# Patient Record
Sex: FEMALE | Race: WHITE | Employment: FULL TIME | URBAN - METROPOLITAN AREA
[De-identification: names, ages, dates, MRNs, and addresses within clinical notes are randomized per-mention and may not be internally consistent; named-entity substitution may affect disease eponyms.]

---

## 2017-09-29 DIAGNOSIS — I10 ESSENTIAL HYPERTENSION WITH GOAL BLOOD PRESSURE LESS THAN 140/90: ICD-10-CM

## 2017-10-01 RX ORDER — LOSARTAN POTASSIUM 50 MG/1
50 TABLET ORAL DAILY
Qty: 15 TAB | Refills: 0 | Status: SHIPPED | OUTPATIENT
Start: 2017-10-01 | End: 2017-12-29 | Stop reason: SDUPTHER

## 2017-10-04 NOTE — TELEPHONE ENCOUNTER
[facility] requesting [facility] days    Faxed form to [facility] patient needs office visit for 90 days medication refill [phone]

## 2017-12-06 ENCOUNTER — OFFICE VISIT (OUTPATIENT)
Dept: FAMILY MEDICINE CLINIC | Age: 57
End: 2017-12-06

## 2017-12-06 VITALS
WEIGHT: 123.2 LBS | DIASTOLIC BLOOD PRESSURE: 87 MMHG | TEMPERATURE: 97.1 F | BODY MASS INDEX: 19.8 KG/M2 | RESPIRATION RATE: 18 BRPM | SYSTOLIC BLOOD PRESSURE: 132 MMHG | OXYGEN SATURATION: 97 % | HEART RATE: 79 BPM | HEIGHT: 66 IN

## 2017-12-06 DIAGNOSIS — Z00.00 PHYSICAL EXAM: Primary | ICD-10-CM

## 2017-12-06 DIAGNOSIS — Z11.59 SCREENING FOR VIRAL DISEASE: ICD-10-CM

## 2017-12-06 DIAGNOSIS — Z12.11 COLON CANCER SCREENING: ICD-10-CM

## 2017-12-06 DIAGNOSIS — L98.9 SKIN LESION OF FOOT: ICD-10-CM

## 2017-12-06 DIAGNOSIS — Z12.31 SCREENING MAMMOGRAM, ENCOUNTER FOR: ICD-10-CM

## 2017-12-06 NOTE — PROGRESS NOTES
HISTORY OF PRESENT ILLNESS  Didier Bryan is a 60 y.o. female. Blood pressure 132/87, pulse 79, temperature 97.1 °F (36.2 °C), temperature source Oral, resp. rate 18, height 5' 6\" (1.676 m), weight 123 lb 3.2 oz (55.9 kg), SpO2 97 %. Body mass index is 19.89 kg/(m^2). Chief Complaint   Patient presents with    Complete Physical     GYN and breast care done by Dr. Efren Llanes        HPI  Didier Bryan 60 y.o. female  presents to the office today for a complete physical.     Hypertension: Bp at office today 132/87. Pt continues with Losartan 50mg daily. Bp control stable, continue current regimen. Health maintenance: Advised pt that she needs to come in for blood work. Also advised pt that she is due for a mammogram, will order one for her to schedule today. Discussed with pt that we need to run a Hep C screening. Also discussed with pt that she is due for a colonoscopy, but due to her time restrictions I will give her a FIT test to take at home. Pt states that she has vericose veins that develop red spots, so she saw a dermatologist who told her it was due to sun exposure. Pt also notes she has a spot on the outside of her right ankle that itches occasionally. Advised pt to have her dermatologist look at it. Current Outpatient Prescriptions   Medication Sig Dispense Refill    losartan (COZAAR) 50 mg tablet Take 1 Tab by mouth daily. 15 Tab 0    cholecalciferol, vitamin D3, 2,000 unit tab Take  by mouth.  B.infantis-B.ani-B.long-B.bifi (PROBIOTIC 4X) 10-15 mg TbEC Take  by mouth.  diphenhydrAMINE (BENADRYL) 25 mg capsule Take 25 mg by mouth every six (6) hours as needed.  CALCIUM CARBONATE/VITAMIN D3 (CALCIUM + D PO) Take  by mouth. Total 770 mg daily calcium and 1000 units vitamin d      MULTIVITAMIN PO Take 1 Tab by mouth daily.       predniSONE (DELTASONE) 20 mg tablet       triamcinolone acetonide (KENALOG) 0.1 % topical cream       lidocaine (LIDODERM) 5 % APPLY PATCH TO THE AFFECTED AREA FOR 12 HOURS A DAY AND REMOVE FOR 12 HOURS A DAY. 30 Each 3    aspirin 81 mg chewable tablet Take 1 Tab by mouth daily.  JASMYNE BERRY PO Take  by mouth. No Known Allergies  Past Medical History:   Diagnosis Date    Abnormal Pap smear     h/o    Allergic rhinitis due to other allergen     Esophageal reflux     Herpes zoster     Mixed hyperlipidemia     Osteopenia     Other atopic dermatitis and related conditions     Shingles rash 10/2015    small spot on left shoulder    Unspecified spontaneous  without mention of complication     X2     Past Surgical History:   Procedure Laterality Date    COLPOSCOPY  29 y/o    with cryo    DILATION AND CURETTAGE      HX COLONOSCOPY      HX WRIST FRACTURE TX      with repair     Family History   Problem Relation Age of Onset    Hypertension Mother     Heart Disease Mother      CHF    Cancer Mother      kidney    Heart Attack Father     Coronary Artery Disease Father      with pacemaker    Asthma Father     Heart Disease Father      pacemaker    Stroke Maternal Grandmother     Heart Attack Paternal Grandfather      Social History   Substance Use Topics    Smoking status: Never Smoker    Smokeless tobacco: Never Used    Alcohol use No        Review of Systems   Constitutional: Negative for chills, diaphoresis, fever, malaise/fatigue and weight loss. HENT: Negative for congestion, ear discharge, ear pain, hearing loss, nosebleeds, sore throat and tinnitus. Eyes: Negative for blurred vision, double vision, photophobia, pain, discharge and redness. Respiratory: Negative for cough, hemoptysis, sputum production, shortness of breath, wheezing and stridor. Cardiovascular: Negative for chest pain, palpitations, orthopnea, claudication, leg swelling and PND. Gastrointestinal: Negative for abdominal pain, blood in stool, constipation, diarrhea, heartburn, melena, nausea and vomiting.    Genitourinary: Negative for dysuria, flank pain, frequency, hematuria and urgency. Musculoskeletal: Negative for back pain, falls, joint pain, myalgias and neck pain. Skin: Negative for itching and rash. Neurological: Negative for dizziness, tingling, tremors, sensory change, speech change, focal weakness, seizures, loss of consciousness, weakness and headaches. Endo/Heme/Allergies: Negative for environmental allergies and polydipsia. Does not bruise/bleed easily. Psychiatric/Behavioral: Negative for depression, hallucinations, memory loss, substance abuse and suicidal ideas. The patient is not nervous/anxious and does not have insomnia. All other systems reviewed and are negative. Physical Exam   Constitutional: She is oriented to person, place, and time. She appears well-developed and well-nourished. No distress. HENT:   Head: Normocephalic and atraumatic. Right Ear: External ear normal.   Left Ear: External ear normal.   Nose: Nose normal.   Mouth/Throat: Oropharynx is clear and moist. No oropharyngeal exudate. Eyes: Conjunctivae and EOM are normal. Pupils are equal, round, and reactive to light. Right eye exhibits no discharge. Left eye exhibits no discharge. No scleral icterus. Neck: Normal range of motion. Neck supple. No JVD present. No tracheal deviation present. No thyromegaly present. Cardiovascular: Normal rate, regular rhythm, normal heart sounds and intact distal pulses. Exam reveals no gallop and no friction rub. No murmur heard. Pulmonary/Chest: Effort normal and breath sounds normal. No stridor. No respiratory distress. She has no wheezes. She has no rales. She exhibits no tenderness. Abdominal: Soft. Bowel sounds are normal. She exhibits no distension and no mass. There is no tenderness. There is no rebound and no guarding. Musculoskeletal: Normal range of motion. She exhibits no edema or tenderness. Lymphadenopathy:     She has no cervical adenopathy.    Neurological: She is alert and oriented to person, place, and time. She has normal reflexes. No cranial nerve deficit. She exhibits normal muscle tone. Coordination normal.   Skin: Skin is warm and dry. No rash noted. She is not diaphoretic. No erythema. No pallor. Psychiatric: She has a normal mood and affect. Her behavior is normal. Judgment and thought content normal.   Nursing note and vitals reviewed. ASSESSMENT and PLAN  Diagnoses and all orders for this visit:    1. Physical exam  -     METABOLIC PANEL, COMPREHENSIVE  -     CBC WITH AUTOMATED DIFF  -     LIPID PANEL  -     TSH 3RD GENERATION  -     T4, FREE  -     URINALYSIS W/MICROSCOPIC  -     VITAMIN D, 25 HYROXY PANEL  -     OCCULT BLOOD, IMMUNOASSAY (FIT); Future    2. Screening for viral disease  -     HCV AB W/RFLX TO NAKUL  - Advised pt that we need to get a Hep C screening on her due    3. Screening mammogram, encounter for  -     EVA MAMMO BI SCREENING INCL CAD; Future  - Advised pt that she is due for a mammogram and will order one for her to schedule. 4. Colon cancer screening  -     OCCULT BLOOD, IMMUNOASSAY (FIT); Future  - Discussed with pt that she is due for a colonoscopy, but due to her time restrictions I will give her a FIT test to take at home. 5. Skin lesion of foot        - Advised pt to follow up with her dermatologist for the lesion on her foot. Comments:  right foot      Follow-up Disposition:  Return in about 1 year (around 12/6/2018) for physical exam.   Medication risks/benefits/costs/interactions/alternatives discussed with patient. Advised patient to call back or return to office if symptoms worsen/change/persist.  If patient cannot reach us or should anything more severe/urgent arise he/she should proceed directly to the nearest emergency department. Discussed expected course/resolution/complications of diagnosis in detail with patient.   Patient given a written after visit summary which includes her diagnoses, current medications and vitals. Patient expressed understanding with the diagnosis and plan. Written by chasity Peña, as dictated by Vicki Jaquez M.D.   I have reviewed and agree with the above note and have made corrections where appropriate, Dr. Frances Hall MD

## 2017-12-06 NOTE — PROGRESS NOTES
Chief Complaint   Patient presents with    Complete Physical     GYN and breast care done by Dr. Dav Guerra       Reviewed Record in preparation for visit and have obtained necessary documentation. Identified pt with two pt identifiers (Name @ )    Health Maintenance Due   Topic    Hepatitis C Screening     DTaP/Tdap/Td series (1 - Tdap)    COLONOSCOPY     BREAST CANCER SCRN MAMMOGRAM     PAP AKA CERVICAL CYTOLOGY     Influenza Age 5 to Adult          1. Have you been to the ER, urgent care clinic since your last visit? Hospitalized since your last visit? No    2. Have you seen or consulted any other health care providers outside of the [address] [place] [address] since your last visit? Include any pap smears or colon screening.  No

## 2017-12-06 NOTE — LETTER
1/9/2018 8:39 AM 
 
Ms. Khari Kramer [address] [place] [address] Drive [id number] Dear Khari Kramer: 
 
Please find your most recent results below. Resulted Orders METABOLIC PANEL, COMPREHENSIVE Result Value Ref Range Glucose 86 65 - 99 mg/dL BUN 13 6 - 24 mg/dL Creatinine 0.85 0.57 - 1.00 mg/dL GFR est non-AA 75 >59 mL/min/1.73 GFR est AA 87 >59 mL/min/1.73  
 BUN/Creatinine ratio 15 9 - 23 Sodium 139 134 - 144 mmol/L Potassium 4.1 3.5 - 5.2 mmol/L Chloride 99 96 - 106 mmol/L  
 CO2 29 18 - 29 mmol/L Calcium 9.4 8.7 - 10.2 mg/dL Protein, total 6.9 6.0 - 8.5 g/dL Albumin 4.5 3.5 - 5.5 g/dL GLOBULIN, TOTAL 2.4 1.5 - 4.5 g/dL A-G Ratio 1.9 1.2 - 2.2 Bilirubin, total 0.5 0.0 - 1.2 mg/dL Alk. phosphatase 46 39 - 117 IU/L  
 AST (SGOT) 19 0 - 40 IU/L  
 ALT (SGPT) 13 0 - 32 IU/L Narrative Performed at:  [address]  [id number] : Matt Luu MD, Phone:  [phone] CBC WITH AUTOMATED DIFF Result Value Ref Range WBC 4.7 3.4 - 10.8 x10E3/uL  
 RBC 4.47 3.77 - 5.28 x10E6/uL HGB 13.2 11.1 - 15.9 g/dL HCT 40.3 34.0 - 46.6 % MCV 90 79 - 97 fL  
 MCH 29.5 26.6 - 33.0 pg  
 MCHC 32.8 31.5 - 35.7 g/dL  
 RDW 13.5 12.3 - 15.4 % PLATELET 485 861 - 189 x10E3/uL NEUTROPHILS 60 Not Estab. % Lymphocytes 32 Not Estab. % MONOCYTES 6 Not Estab. % EOSINOPHILS 1 Not Estab. % BASOPHILS 1 Not Estab. %  
 ABS. NEUTROPHILS 2.8 1.4 - 7.0 x10E3/uL Abs Lymphocytes 1.5 0.7 - 3.1 x10E3/uL  
 ABS. MONOCYTES 0.3 0.1 - 0.9 x10E3/uL  
 ABS. EOSINOPHILS 0.1 0.0 - 0.4 x10E3/uL  
 ABS. BASOPHILS 0.0 0.0 - 0.2 x10E3/uL IMMATURE GRANULOCYTES 0 Not Estab. %  
 ABS. IMM. GRANS. 0.0 0.0 - 0.1 x10E3/uL Narrative Performed at:  [address]  [id number] : Matt Luu MD, Phone:  [phone] LIPID PANEL Result Value Ref Range Cholesterol, total 235 (H) 100 - 199 mg/dL Triglyceride 81 0 - 149 mg/dL HDL Cholesterol 73 >39 mg/dL VLDL, calculated 16 5 - 40 mg/dL LDL, calculated 146 (H) 0 - 99 mg/dL Narrative Performed at:  [address]  [id number] : Franco Tomlinson MD, Phone:  [phone] TSH 3RD GENERATION Result Value Ref Range TSH 1.720 0.450 - 4.500 uIU/mL Narrative Performed at:  [address]  [id number] : Franco Tomlinson MD, Phone:  [phone] T4, FREE Result Value Ref Range T4, Free 1.14 0.82 - 1.77 ng/dL Narrative Performed at:  [address]  [id number] : Franco Tomlinson MD, Phone:  [phone] URINALYSIS W/MICROSCOPIC Result Value Ref Range Specific Gravity 1.020 1.005 - 1.030  
 pH (UA) 7.5 5.0 - 7.5 Color Yellow Yellow Appearance Clear Clear Leukocyte Esterase Negative Negative Protein Negative Negative/Trace Glucose Negative Negative Ketone Negative Negative Blood Negative Negative Bilirubin Negative Negative Urobilinogen 0.2 0.2 - 1.0 mg/dL Nitrites Negative Negative Microscopic Examination Comment Comment:  
   Microscopic follows if indicated. Microscopic exam See additional order Comment:  
   Microscopic was indicated and was performed. Narrative Performed at:  [address]  [id number] : Franco Tomlinson MD, Phone:  [phone] VITAMIN D, 25 HYROXY PANEL Result Value Ref Range 25-HYDROXY, VITAMIN D 45 ng/mL Comment:  
   Reference Range: All Ages: Target levels 30 - 100 
  
 25-HYDROXY, VITAMIN D-2 <1.0 ng/mL 25-HYDROXY, VITAMIN D-3 44 ng/mL Narrative Performed at:  [address] [place] [address] Endocrinology [address] [place] [address]  [de-identified] : Brant Rebollar MD, Phone:  [phone] HCV AB W/RFLX TO NAKUL Result Value Ref Range HCV Ab <0.1 0.0 - 0.9 s/co ratio Narrative Performed at:  [address]  [id number] : Lisa West MD, Phone:  [phone] MICROSCOPIC EXAMINATION Result Value Ref Range WBC 0-5 0 - 5 /hpf  
 RBC 0-2 0 - 2 /hpf Epithelial cells None seen 0 - 10 /hpf Casts None seen None seen /lpf Mucus Present Not Estab. Bacteria Few None seen/Few Narrative Performed at:  [address]  [id number] : Lisa West MD, Phone:  [phone] INTERPRETATION Result Value Ref Range HCV Interpretation Comment Comment:  
   Negative Not infected with HCV, unless recent infection is suspected or other 
evidence exists to indicate HCV infection. Narrative Performed at:  [address]  [id number] : Lisa West MD, Phone:  [phone] CVD REPORT Result Value Ref Range INTERPRETATION Note Comment:  
   Supplemental report is available. Narrative Performed at:  [address] [place] [address]  [id number] : Krzysztof Carcamo PhD, Phone:  [phone] Please call me if you have any questions: [phone] Sincerely, Patrick Noble MD

## 2017-12-06 NOTE — MR AVS SNAPSHOT
Visit Information Date & Time Provider Department Dept. Phone Encounter #  
 12/6/2017  3:15 PM Sam Thakkar MD [address] [place] [address] Avenue [phone] [id number] Follow-up Instructions Return in about 1 year (around 12/6/2018) for physical exam. Upcoming Health Maintenance Date Due Hepatitis C Screening 1960 DTaP/Tdap/Td series (1 - Tdap) 12/26/1979 COLONOSCOPY 1/15/2015 BREAST CANCER SCRN MAMMOGRAM 11/1/2015 PAP AKA CERVICAL CYTOLOGY 1/1/2016 Allergies as of 12/6/2017  Review Complete On: 12/6/2017 By: Javy Rueda LPN No Known Allergies Current Immunizations  Never Reviewed Name Date Influenza Vaccine 10/4/2015 Not reviewed this visit You Were Diagnosed With   
  
 Codes Comments Physical exam    -  Primary ICD-10-CM: Z00.00 ICD-9-CM: V70.9 Screening for viral disease     ICD-10-CM: Z11.59 
ICD-9-CM: V73.99 Screening mammogram, encounter for     ICD-10-CM: Z12.31 
ICD-9-CM: V76.12 Colon cancer screening     ICD-10-CM: Z12.11 ICD-9-CM: V76.51 Skin lesion of foot     ICD-10-CM: L98.9 ICD-9-CM: 709.9 right foot Vitals BP Pulse Temp Resp Height(growth percentile) Weight(growth percentile) 132/87 (BP 1 Location: Left arm, BP Patient Position: Sitting) 79 97.1 °F (36.2 °C) (Oral) 18 5' 6\" (1.676 m) 123 lb 3.2 oz (55.9 kg) SpO2 BMI OB Status Smoking Status 97% 19.89 kg/m2 Postmenopausal Never Smoker Vitals History BMI and BSA Data Body Mass Index Body Surface Area  
 19.89 kg/m 2 1.61 m 2 Preferred Pharmacy Pharmacy Name Phone [id number], [address] [place] [address] [phone] Your Updated Medication List  
  
   
This list is accurate as of: 12/6/17  4:26 PM.  Always use your most recent med list.  
  
  
  
  
 aspirin 81 mg chewable tablet Take 1 Tab by mouth daily. BENADRYL 25 mg capsule Generic drug:  diphenhydrAMINE Take 25 mg by mouth every six (6) hours as needed. CALCIUM + D PO Take  by mouth. Total 770 mg daily calcium and 1000 units vitamin d  
  
 cholecalciferol (vitamin D3) 2,000 unit Tab Take  by mouth. JASMYNE ARVIZU PO Take  by mouth.  
  
 lidocaine 5 % Commonly known as:  LIDODERM  
APPLY PATCH TO THE AFFECTED AREA FOR 12 HOURS A DAY AND REMOVE FOR 12 HOURS A DAY. losartan 50 mg tablet Commonly known as:  COZAAR Take 1 Tab by mouth daily. MULTIVITAMIN PO Take 1 Tab by mouth daily. predniSONE 20 mg tablet Commonly known as:  DELTASONE  
  
 PROBIOTIC 4X 10-15 mg Tbec Generic drug:  B.infantis-B.ani-B.long-B.bifi Take  by mouth.  
  
 triamcinolone acetonide 0.1 % topical cream  
Commonly known as:  KENALOG We Performed the Following CBC WITH AUTOMATED DIFF [81807 CPT(R)] HCV AB W/RFLX TO NAKUL [54563 CPT(R)] LIPID PANEL [03618 CPT(R)] METABOLIC PANEL, COMPREHENSIVE [61940 CPT(R)] T4, FREE [id number] CPT(R)] TSH 3RD GENERATION [56475 CPT(R)] URINALYSIS W/MICROSCOPIC [47883 CPT(R)] VITAMIN D, 25 HYROXY PANEL [[id number] Custom] Follow-up Instructions Return in about 1 year (around 12/6/2018) for physical exam. To-Do List   
 12/06/2017 Imaging:  EVA MAMMO BI SCREENING INCL CAD   
  
 01/05/2018 Lab:  OCCULT BLOOD, IMMUNOASSAY (FIT) Patient Instructions Well Visit, Ages 25 to 48: Care Instructions Your Care Instructions Physical exams can help you stay healthy. Your doctor has checked your overall health and may have suggested ways to take good care of yourself. He or she also may have recommended tests. At home, you can help prevent illness with healthy eating, regular exercise, and other steps. Follow-up care is a key part of your treatment and safety.  Be sure to make and go to all appointments, and call your doctor if you are having problems. It's also a good idea to know your test results and keep a list of the medicines you take. How can you care for yourself at home? · Reach and stay at a healthy weight. This will lower your risk for many problems, such as obesity, diabetes, heart disease, and high blood pressure. · Get at least 30 minutes of physical activity on most days of the week. Walking is a good choice. You also may want to do other activities, such as running, swimming, cycling, or playing tennis or team sports. Discuss any changes in your exercise program with your doctor. · Do not smoke or allow others to smoke around you. If you need help quitting, talk to your doctor about stop-smoking programs and medicines. These can increase your chances of quitting for good. · Talk to your doctor about whether you have any risk factors for sexually transmitted infections (STIs). Having one sex partner (who does not have STIs and does not have sex with anyone else) is a good way to avoid these infections. · Use birth control if you do not want to have children at this time. Talk with your doctor about the choices available and what might be best for you. · Protect your skin from too much sun. When you're outdoors from 10 a.m. to 4 p.m., stay in the shade or cover up with clothing and a hat with a wide brim. Wear sunglasses that block UV rays. Even when it's cloudy, put broad-spectrum sunscreen (SPF 30 or higher) on any exposed skin. · See a dentist one or two times a year for checkups and to have your teeth cleaned. · Wear a seat belt in the car. · Drink alcohol in moderation, if at all. That means no more than 2 drinks a day for men and 1 drink a day for women. Follow your doctor's advice about when to have certain tests. These tests can spot problems early. For everyone · Cholesterol. Have the fat (cholesterol) in your blood tested after age 21.  Your doctor will tell you how often to have this done based on your age, family history, or other things that can increase your risk for heart disease. · Blood pressure. Have your blood pressure checked during a routine doctor visit. Your doctor will tell you how often to check your blood pressure based on your age, your blood pressure results, and other factors. · Vision. Talk with your doctor about how often to have a glaucoma test. 
· Diabetes. Ask your doctor whether you should have tests for diabetes. · Colon cancer. Have a test for colon cancer at age 48. You may have one of several tests. If you are younger than 48, you may need a test earlier if you have any risk factors. Risk factors include whether you already had a precancerous polyp removed from your colon or whether your parent, brother, sister, or child has had colon cancer. For women · Breast exam and mammogram. Talk to your doctor about when you should have a clinical breast exam and a mammogram. Medical experts differ on whether and how often women under 50 should have these tests. Your doctor can help you decide what is right for you. · Pap test and pelvic exam. Begin Pap tests at age 24. A Pap test is the best way to find cervical cancer. The test often is part of a pelvic exam. Ask how often to have this test. 
· Tests for sexually transmitted infections (STIs). Ask whether you should have tests for STIs. You may be at risk if you have sex with more than one person, especially if your partners do not wear condoms. For men · Tests for sexually transmitted infections (STIs). Ask whether you should have tests for STIs. You may be at risk if you have sex with more than one person, especially if you do not wear a condom. · Testicular cancer exam. Ask your doctor whether you should check your testicles regularly. · Prostate exam. Talk to your doctor about whether you should have a blood test (called a PSA test) for prostate cancer.  Experts differ on whether and when men should have this test. Some experts suggest it if you are older than 39 and are -American or have a father or brother who got prostate cancer when he was younger than 72. When should you call for help? Watch closely for changes in your health, and be sure to contact your doctor if you have any problems or symptoms that concern you. Where can you learn more? Go to http://ilsa-michael.info/. Enter P072 in the search box to learn more about \"Well Visit, Ages 25 to 48: Care Instructions. \" Current as of: May 12, 2017 Content Version: 11.4 © 0116-3172 [employer]. Care instructions adapted under license by [employer] (which disclaims liability or warranty for this information). If you have questions about a medical condition or this instruction, always ask your healthcare professional. [address] any warranty or liability for your use of this information. Introducing [facility]! Dear Vania Belcher: Thank you for requesting a [employer] account. Our records indicate that you already have an active [employer] account. You can access your account anytime at [email or web address] Did you know that you can access your hospital and ER discharge instructions at any time in [employer]? You can also review all of your test results from your hospital stay or ER visit. Additional Information If you have questions, please visit the Frequently Asked Questions section of the [employer] website at [email or web address] Remember, [employer] is NOT to be used for urgent needs. For medical emergencies, dial 911. Now available from your iPhone and Android! Please provide this summary of care documentation to your next provider. Your primary care clinician is listed as Karen Carry. If you have any questions after today's visit, please call [phone].

## 2017-12-06 NOTE — PATIENT INSTRUCTIONS

## 2017-12-29 DIAGNOSIS — I10 ESSENTIAL HYPERTENSION WITH GOAL BLOOD PRESSURE LESS THAN 140/90: ICD-10-CM

## 2017-12-30 ENCOUNTER — APPOINTMENT (OUTPATIENT)
Dept: FAMILY MEDICINE CLINIC | Age: 57
End: 2017-12-30

## 2018-01-02 RX ORDER — LOSARTAN POTASSIUM 50 MG/1
50 TABLET ORAL DAILY
Qty: 30 TAB | Refills: 5 | Status: SHIPPED | OUTPATIENT
Start: 2018-01-02 | End: 2018-07-03 | Stop reason: SDUPTHER

## 2018-01-05 LAB
25(OH)D2 SERPL-MCNC: <1 NG/ML
25(OH)D3 SERPL-MCNC: 44 NG/ML
25(OH)D3+25(OH)D2 SERPL-MCNC: 45 NG/ML
ALBUMIN SERPL-MCNC: 4.5 G/DL (ref 3.5–5.5)
ALBUMIN/GLOB SERPL: 1.9 {RATIO} (ref 1.2–2.2)
ALP SERPL-CCNC: 46 IU/L (ref 39–117)
ALT SERPL-CCNC: 13 IU/L (ref 0–32)
APPEARANCE UR: CLEAR
AST SERPL-CCNC: 19 IU/L (ref 0–40)
BACTERIA #/AREA URNS HPF: NORMAL /[HPF]
BASOPHILS # BLD AUTO: 0 X10E3/UL (ref 0–0.2)
BASOPHILS NFR BLD AUTO: 1 %
BILIRUB SERPL-MCNC: 0.5 MG/DL (ref 0–1.2)
BILIRUB UR QL STRIP: NEGATIVE
BUN SERPL-MCNC: 13 MG/DL (ref 6–24)
BUN/CREAT SERPL: 15 (ref 9–23)
CALCIUM SERPL-MCNC: 9.4 MG/DL (ref 8.7–10.2)
CASTS URNS QL MICRO: NORMAL /LPF
CHLORIDE SERPL-SCNC: 99 MMOL/L (ref 96–106)
CHOLEST SERPL-MCNC: 235 MG/DL (ref 100–199)
CO2 SERPL-SCNC: 29 MMOL/L (ref 18–29)
COLOR UR: YELLOW
CREAT SERPL-MCNC: 0.85 MG/DL (ref 0.57–1)
EOSINOPHIL # BLD AUTO: 0.1 X10E3/UL (ref 0–0.4)
EOSINOPHIL NFR BLD AUTO: 1 %
EPI CELLS #/AREA URNS HPF: NORMAL /HPF
ERYTHROCYTE [DISTWIDTH] IN BLOOD BY AUTOMATED COUNT: 13.5 % (ref 12.3–15.4)
GLOBULIN SER CALC-MCNC: 2.4 G/DL (ref 1.5–4.5)
GLUCOSE SERPL-MCNC: 86 MG/DL (ref 65–99)
GLUCOSE UR QL: NEGATIVE
HCT VFR BLD AUTO: 40.3 % (ref 34–46.6)
HCV AB S/CO SERPL IA: <0.1 S/CO RATIO (ref 0–0.9)
HCV AB SERPL QL IA: NORMAL
HDLC SERPL-MCNC: 73 MG/DL
HGB BLD-MCNC: 13.2 G/DL (ref 11.1–15.9)
HGB UR QL STRIP: NEGATIVE
IMM GRANULOCYTES # BLD: 0 X10E3/UL (ref 0–0.1)
IMM GRANULOCYTES NFR BLD: 0 %
INTERPRETATION, 910389: NORMAL
KETONES UR QL STRIP: NEGATIVE
LDLC SERPL CALC-MCNC: 146 MG/DL (ref 0–99)
LEUKOCYTE ESTERASE UR QL STRIP: NEGATIVE
LYMPHOCYTES # BLD AUTO: 1.5 X10E3/UL (ref 0.7–3.1)
LYMPHOCYTES NFR BLD AUTO: 32 %
MCH RBC QN AUTO: 29.5 PG (ref 26.6–33)
MCHC RBC AUTO-ENTMCNC: 32.8 G/DL (ref 31.5–35.7)
MCV RBC AUTO: 90 FL (ref 79–97)
MICRO URNS: NORMAL
MICRO URNS: NORMAL
MONOCYTES # BLD AUTO: 0.3 X10E3/UL (ref 0.1–0.9)
MONOCYTES NFR BLD AUTO: 6 %
MUCOUS THREADS URNS QL MICRO: PRESENT
NEUTROPHILS # BLD AUTO: 2.8 X10E3/UL (ref 1.4–7)
NEUTROPHILS NFR BLD AUTO: 60 %
NITRITE UR QL STRIP: NEGATIVE
PH UR STRIP: 7.5 [PH] (ref 5–7.5)
PLATELET # BLD AUTO: 223 X10E3/UL (ref 150–379)
POTASSIUM SERPL-SCNC: 4.1 MMOL/L (ref 3.5–5.2)
PROT SERPL-MCNC: 6.9 G/DL (ref 6–8.5)
PROT UR QL STRIP: NEGATIVE
RBC # BLD AUTO: 4.47 X10E6/UL (ref 3.77–5.28)
RBC #/AREA URNS HPF: NORMAL /HPF
SODIUM SERPL-SCNC: 139 MMOL/L (ref 134–144)
SP GR UR: 1.02 (ref 1–1.03)
T4 FREE SERPL-MCNC: 1.14 NG/DL (ref 0.82–1.77)
TRIGL SERPL-MCNC: 81 MG/DL (ref 0–149)
TSH SERPL DL<=0.005 MIU/L-ACNC: 1.72 UIU/ML (ref 0.45–4.5)
UROBILINOGEN UR STRIP-MCNC: 0.2 MG/DL (ref 0.2–1)
VLDLC SERPL CALC-MCNC: 16 MG/DL (ref 5–40)
WBC # BLD AUTO: 4.7 X10E3/UL (ref 3.4–10.8)
WBC #/AREA URNS HPF: NORMAL /HPF

## 2018-01-06 NOTE — PROGRESS NOTES
Inform pt to go to my chart to see results and recommendations    Ms. Lio Plummer,  The labs in general look good, cholesterol is up a bit  I would like the LDL to be less than 130  Just work on diet and exercise, more fibers/veggies/fish  30 min of cardio daily. Rest of the labs look good.     If you have any questions let me know    Regards  H

## 2018-01-09 NOTE — PROGRESS NOTES
[phone] spoke to patient verified  Patient notified of above note and voiced understanding of what was read.   Mail results to patient per request

## 2018-03-02 ENCOUNTER — OFFICE VISIT (OUTPATIENT)
Dept: FAMILY MEDICINE CLINIC | Age: 58
End: 2018-03-02

## 2018-03-02 VITALS
HEART RATE: 63 BPM | HEIGHT: 66 IN | WEIGHT: 121.6 LBS | DIASTOLIC BLOOD PRESSURE: 88 MMHG | BODY MASS INDEX: 19.54 KG/M2 | OXYGEN SATURATION: 95 % | TEMPERATURE: 98 F | RESPIRATION RATE: 18 BRPM | SYSTOLIC BLOOD PRESSURE: 132 MMHG

## 2018-03-02 DIAGNOSIS — R39.15 URINARY URGENCY: Primary | ICD-10-CM

## 2018-03-02 LAB
BILIRUB UR QL STRIP: NEGATIVE
GLUCOSE UR-MCNC: NEGATIVE MG/DL
KETONES P FAST UR STRIP-MCNC: NEGATIVE MG/DL
PH UR STRIP: 7.5 [PH] (ref 4.6–8)
PROT UR QL STRIP: NEGATIVE
SP GR UR STRIP: 1.01 (ref 1–1.03)
UA UROBILINOGEN AMB POC: NORMAL (ref 0.2–1)
URINALYSIS CLARITY POC: CLEAR
URINALYSIS COLOR POC: YELLOW
URINE BLOOD POC: NORMAL
URINE LEUKOCYTES POC: NEGATIVE
URINE NITRITES POC: NEGATIVE

## 2018-03-02 NOTE — PATIENT INSTRUCTIONS
Kegel Exercises: Care Instructions  Your Care Instructions    Kegel exercises strengthen muscles around the bladder. These muscles control the flow of urine. Kegel exercises are sometime called \"pelvic floor\" exercises. They can help prevent urine leakage and keep the pelvic organs in place. A woman who just had a baby might want to try Kegel exercises. They can strengthen pelvic muscles that have been weakened by pregnancy and childbirth. A man or woman may use Kegel exercises to treat urine leakage. You do Kegel exercises by tightening the muscles you use when you urinate. You will likely need to do these exercises for several weeks to get better. Follow-up care is a key part of your treatment and safety. Be sure to make and go to all appointments, and call your doctor if you are having problems. It's also a good idea to know your test results and keep a list of the medicines you take. How can you care for yourself at home? · Do Kegel exercises. ¨ Find the muscles you need to strengthen. To do this, tighten the muscles that stop your urine while you are going to the bathroom. These are the same muscles you squeeze during Kegel exercises. ¨ Squeeze the muscles as hard as you can. Your belly and thighs should not move. ¨ Hold the squeeze for 3 seconds. Then relax for 3 seconds. ¨ Start with 3 seconds, and then add 1 second each week until you are able to squeeze for 10 seconds. ¨ Repeat the exercise 10 to 15 times for each session. Do three or more sessions each day. · You can check to see if you are using the right muscles. Place a finger in your vagina and squeeze around it. You are doing them right when you feel pressure around your finger. Your doctor may also suggest that you put special weights in your vagina while you do the exercises. · Do not smoke. It can irritate the bladder. If you need help quitting, talk to your doctor about stop-smoking programs and medicines.  These can increase your chances of quitting for good. Where can you learn more? Go to http://ilsa-michael.info/. Enter C419 in the search box to learn more about \"Kegel Exercises: Care Instructions. \"  Current as of: May 12, 2017  Content Version: 11.4  © 2006-9635 Healthwise, Incorporated. Care instructions adapted under license by [employer] (which disclaims liability or warranty for this information). If you have questions about a medical condition or this instruction, always ask your healthcare professional. [address] any warranty or liability for your use of this information.

## 2018-03-02 NOTE — PROGRESS NOTES
Patient Name: Lowell Koch   MRN: [id number]    Rosalina Duong is a 59 y.o. female who presents with the following:     Patient reports several year history of frequent urination, mostly at nighttime. States that it has gotten worse over the past few weeks. Symptoms include urinary urgency, frequency, and incomplete bladder emptying. Denies dysuria, history of kidney stones, vaginal symptoms, uterine prolapse, flank pain, abdominal pain. Does not have a history of smoking. 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 Usually does not have daytime symptoms. Does not have a history of recurrent UTIs. Tries to do Kegel exercises. History of 2 vaginal deliveries. No history of diabetes. Review of Systems   Constitutional: Negative for fever, malaise/fatigue and weight loss. Respiratory: Negative for cough, hemoptysis, shortness of breath and wheezing. Cardiovascular: Negative for chest pain, palpitations, leg swelling and PND. Gastrointestinal: Negative for abdominal pain, constipation, diarrhea, nausea and vomiting. Genitourinary: Positive for frequency and urgency. Negative for dysuria, flank pain and hematuria. The patient's medications, allergies, past medical history, surgical history, family history and social history were reviewed and updated where appropriate. Prior to Admission medications    Medication Sig Start Date End Date Taking? Authorizing Provider   losartan (COZAAR) 50 mg tablet Take 1 Tab by mouth daily. 1/2/18  Yes Vanessa Harvey MD   cholecalciferol, vitamin D3, 2,000 unit tab Take 2,000 Units by mouth daily. Yes Historical Provider   B.infantis-B.ani-B.long-B.bifi (PROBIOTIC 4X) 10-15 mg TbEC Take  by mouth. Yes Historical Provider   diphenhydrAMINE (BENADRYL) 25 mg capsule Take 25 mg by mouth every six (6) hours as needed.    Yes Historical Provider   CALCIUM CARBONATE/VITAMIN D3 (CALCIUM + D PO) Take  by mouth. Total 770 mg daily calcium and 1000 units vitamin d   Yes Historical Provider   MULTIVITAMIN PO Take 1 Tab by mouth daily. Yes Historical Provider       No Known Allergies        OBJECTIVE    Visit Vitals    /88 (BP 1 Location: Right arm, BP Patient Position: Sitting)    Pulse 63    Temp 98 °F (36.7 °C) (Oral)    Resp 18    Ht 5' 6\" (1.676 m)    Wt 121 lb 9.6 oz (55.2 kg)    SpO2 95%    BMI 19.63 kg/m2       Physical Exam   Constitutional: She is oriented to person, place, and time and well-developed, well-nourished, and in no distress. No distress. Eyes: Conjunctivae and EOM are normal. Pupils are equal, round, and reactive to light. Abdominal: Soft. Bowel sounds are normal. She exhibits no distension and no mass. There is no tenderness. There is no rebound and no guarding. Musculoskeletal: Normal range of motion. Neurological: She is alert and oriented to person, place, and time. Gait normal.   Skin: Skin is warm and dry. She is not diaphoretic. Psychiatric: Mood, memory, affect and judgment normal.   Nursing note and vitals reviewed. ASSESSMENT AND PLAN  Maulik Shepard is a 59 y.o. female who presents today for:    1. Urinary urgency  UA notable for trace blood. Will send for culture to rule out infection. Recommend urology referral for possible overactive bladder. Encourage timed voiding, withholding liquids before bedtime, and Kegel exercises. - CULTURE, URINE  - URINALYSIS W/ RFLX MICROSCOPIC  - REFERRAL TO UROLOGY  - AMB POC URINALYSIS DIP STICK AUTO W/O MICRO       Medications Discontinued During This Encounter   Medication Reason    losartan (COZAAR) 50 mg tablet Duplicate Order       Follow-up Disposition:  Return if symptoms worsen or fail to improve. Medication risks/benefits/costs/interactions/alternatives discussed with patient.   Advised patient to call back or return to office if symptoms worsen/change/persist. If patient cannot reach us or should anything more severe/urgent arise he/she should proceed directly to the nearest emergency department. Discussed expected course/resolution/complications of diagnosis in detail with patient. Patient given a written after visit summary which includes his/her diagnoses, current medications and vitals. Patient expressed understanding with the diagnosis and plan.      Adrián Desai M.D.

## 2018-03-02 NOTE — MR AVS SNAPSHOT
[address] [place] [address] 
[phone] Patient: Erlinda Blake MRN: [id number] :1960 Visit Information Date & Time Provider Department Dept. Phone Encounter #  
 3/2/2018  9:15 AM Min Richter MD [facility] [phone] [id number] Upcoming Health Maintenance Date Due DTaP/Tdap/Td series (1 - Tdap) 1979 COLONOSCOPY 1/15/2015 BREAST CANCER SCRN MAMMOGRAM 2015 PAP AKA CERVICAL CYTOLOGY 2016 Allergies as of 3/2/2018  Review Complete On: 3/2/2018 By: Anthony Shows No Known Allergies Current Immunizations  Never Reviewed Name Date Influenza Vaccine 10/4/2015 Not reviewed this visit You Were Diagnosed With   
  
 Codes Comments Urination frequency    -  Primary ICD-10-CM: R35.0 ICD-9-CM: 788.41 Vitals BP Pulse Temp Resp Height(growth percentile) Weight(growth percentile) 132/88 (BP 1 Location: Right arm, BP Patient Position: Sitting) 63 98 °F (36.7 °C) (Oral) 18 5' 6\" (1.676 m) 121 lb 9.6 oz (55.2 kg) SpO2 BMI OB Status Smoking Status 95% 19.63 kg/m2 Postmenopausal Never Smoker Vitals History BMI and BSA Data Body Mass Index Body Surface Area  
 19.63 kg/m 2 1.6 m 2 Preferred Pharmacy Pharmacy Name Phone [id number] [phone] Your Updated Medication List  
  
   
This list is accurate as of 3/2/18 10:00 AM.  Always use your most recent med list.  
  
  
  
  
 BENADRYL 25 mg capsule Generic drug:  diphenhydrAMINE Take 25 mg by mouth every six (6) hours as needed. CALCIUM + D PO Take  by mouth. Total 770 mg daily calcium and 1000 units vitamin d  
  
 cholecalciferol (vitamin D3) 2,000 unit Tab Take 2,000 Units by mouth daily. losartan 50 mg tablet Commonly known as:  COZAAR  
 Take 1 Tab by mouth daily. MULTIVITAMIN PO Take 1 Tab by mouth daily. PROBIOTIC 4X 10-15 mg Tbec Generic drug:  B.infantis-B.ani-B.long-B.bifi Take  by mouth. We Performed the Following CULTURE, URINE [id number] CPT(R)] REFERRAL TO UROLOGY [RTK152 Custom] Comments:  
 Patient will schedule referral himself/herself URINALYSIS W/ RFLX MICROSCOPIC [51786 CPT(R)] Referral Information Referral ID Referred By Referred To  
  
 [id number] Rylie Pritchard MD   
   [facility] [address] [facility], [address] Phone: [phone] Fax: [phone] Visits Status Start Date End Date 1 New Request 3/2/18 3/2/19 If your referral has a status of pending review or denied, additional information will be sent to support the outcome of this decision. Patient Instructions Kegel Exercises: Care Instructions Your Care Instructions Kegel exercises strengthen muscles around the bladder. These muscles control the flow of urine. Kegel exercises are sometime called \"pelvic floor\" exercises. They can help prevent urine leakage and keep the pelvic organs in place. A woman who just had a baby might want to try Kegel exercises. They can strengthen pelvic muscles that have been weakened by pregnancy and childbirth. A man or woman may use Kegel exercises to treat urine leakage. You do Kegel exercises by tightening the muscles you use when you urinate. You will likely need to do these exercises for several weeks to get better. Follow-up care is a key part of your treatment and safety. Be sure to make and go to all appointments, and call your doctor if you are having problems. It's also a good idea to know your test results and keep a list of the medicines you take. How can you care for yourself at home? · Do Kegel exercises. ¨ Find the muscles you need to strengthen.  To do this, tighten the muscles that stop your urine while you are going to the bathroom. These are the same muscles you squeeze during Kegel exercises. ¨ Squeeze the muscles as hard as you can. Your belly and thighs should not move. ¨ Hold the squeeze for 3 seconds. Then relax for 3 seconds. ¨ Start with 3 seconds, and then add 1 second each week until you are able to squeeze for 10 seconds. ¨ Repeat the exercise 10 to 15 times for each session. Do three or more sessions each day. · You can check to see if you are using the right muscles. Place a finger in your vagina and squeeze around it. You are doing them right when you feel pressure around your finger. Your doctor may also suggest that you put special weights in your vagina while you do the exercises. · Do not smoke. It can irritate the bladder. If you need help quitting, talk to your doctor about stop-smoking programs and medicines. These can increase your chances of quitting for good. Where can you learn more? Go to http://ilsa-michael.info/. Enter U268 in the search box to learn more about \"Kegel Exercises: Care Instructions. \" Current as of: May 12, 2017 Content Version: 11.4 © 9282-0785 [employer]. Care instructions adapted under license by [employer] (which disclaims liability or warranty for this information). If you have questions about a medical condition or this instruction, always ask your healthcare professional. [place] [address] any warranty or liability for your use of this information. Introducing [facility]! Dear Otilio Cope: Thank you for requesting a Strap account. Our records indicate that you already have an active Strap account. You can access your account anytime at [email or web address]. [email or web address] Did you know that you can access your hospital and ER discharge instructions at any time in Strap?   You can also review all of your test results from your hospital stay or ER visit. Additional Information If you have questions, please visit the Frequently Asked Questions section of the VeliQ website at [email or web address] Remember, VeliQ is NOT to be used for urgent needs. For medical emergencies, dial 911. Now available from your iPhone and Android! Please provide this summary of care documentation to your next provider. Your primary care clinician is listed as Matthew Rodriges. If you have any questions after today's visit, please call [phone].

## 2018-03-02 NOTE — PROGRESS NOTES
Chief Complaint   Patient presents with    Urinary Frequency     during the night - denies any pain or disconfort x 3 days     1. Have you been to the ER, urgent care clinic since your last visit? Hospitalized since your last visit? No    2. Have you seen or consulted any other health care providers outside of the [address] [place] [address] since your last visit? Include any pap smears or colon screening.  No

## 2018-03-03 LAB — BACTERIA UR CULT: NO GROWTH

## 2018-03-04 LAB
APPEARANCE UR: CLEAR
BILIRUB UR QL STRIP: NEGATIVE
COLOR UR: YELLOW
GLUCOSE UR QL: NEGATIVE
HGB UR QL STRIP: NEGATIVE
KETONES UR QL STRIP: NEGATIVE
LEUKOCYTE ESTERASE UR QL STRIP: NEGATIVE
MICRO URNS: ABNORMAL
NITRITE UR QL STRIP: NEGATIVE
PH UR STRIP: 8 [PH] (ref 5–7.5)
PROT UR QL STRIP: NEGATIVE
SP GR UR: 1.01 (ref 1–1.03)
UROBILINOGEN UR STRIP-MCNC: 0.2 MG/DL (ref 0.2–1)

## 2018-03-05 NOTE — PROGRESS NOTES
Please notify patient regarding their test results:    No UTI or blood on confirmatory urine tests. Pt to f/u with urology as discussed.

## 2018-03-07 NOTE — PROGRESS NOTES
Call to patient.  verified. Informed patient of results and recommendations.  Patient understands to follow up with urologist

## 2018-07-11 ENCOUNTER — OFFICE VISIT (OUTPATIENT)
Dept: FAMILY MEDICINE CLINIC | Age: 58
End: 2018-07-11

## 2018-07-11 VITALS
BODY MASS INDEX: 19.73 KG/M2 | RESPIRATION RATE: 16 BRPM | HEART RATE: 70 BPM | HEIGHT: 66 IN | TEMPERATURE: 97.6 F | WEIGHT: 122.8 LBS | DIASTOLIC BLOOD PRESSURE: 82 MMHG | SYSTOLIC BLOOD PRESSURE: 118 MMHG | OXYGEN SATURATION: 95 %

## 2018-07-11 DIAGNOSIS — R22.2 ABDOMINAL WALL LUMP: Primary | ICD-10-CM

## 2018-07-11 NOTE — PROGRESS NOTES
Chief Complaint   Patient presents with    Other     swelling in lower abdomen, pt thinks this may be due to loose abdominal muscles for about 1 year. 1. Have you been to the ER, urgent care clinic since your last visit? Hospitalized since your last visit? No    2. Have you seen or consulted any other health care providers outside of the [address] [place] [address] since your last visit? Include any pap smears or colon screening.  No

## 2018-07-11 NOTE — PROGRESS NOTES
HISTORY OF PRESENT ILLNESS  Harrison Stephens is a 59 y.o. female. HPI: Patient reports she noticed a large lump on her abdomen several months ago. It started after exercising and it is progressively getting worse. It is more noticeable with sitting, better with lying down. It is associated with mild discomfort. Past Medical History:   Diagnosis Date    Abnormal Pap smear     h/o    Allergic rhinitis due to other allergen     Esophageal reflux     Herpes zoster     Mixed hyperlipidemia     Osteopenia     Other atopic dermatitis and related conditions     Shingles rash 10/2015    small spot on left shoulder    Unspecified spontaneous  without mention of complication     X2     Past Surgical History:   Procedure Laterality Date    COLPOSCOPY  29 y/o    with cryo    DILATION AND CURETTAGE      HX COLONOSCOPY      HX WRIST FRACTURE TX      with repair   No Known Allergies    Current Outpatient Prescriptions:     losartan (COZAAR) 50 mg tablet, TAKE 1 TAB BY MOUTH DAILY. , Disp: 30 Tab, Rfl: 4    cholecalciferol, vitamin D3, 2,000 unit tab, Take 2,000 Units by mouth daily. , Disp: , Rfl:     B.infantis-B.ani-B.long-B.bifi (PROBIOTIC 4X) 10-15 mg TbEC, Take  by mouth., Disp: , Rfl:     diphenhydrAMINE (BENADRYL) 25 mg capsule, Take 25 mg by mouth every six (6) hours as needed. , Disp: , Rfl:     MULTIVITAMIN PO, Take 1 Tab by mouth daily. , Disp: , Rfl:     CALCIUM CARBONATE/VITAMIN D3 (CALCIUM + D PO), Take  by mouth. Total 770 mg daily calcium and 1000 units vitamin d, Disp: , Rfl:   Review of Systems   Constitutional: Negative. Respiratory: Negative. Cardiovascular: Negative. Gastrointestinal: Negative. Blood pressure 118/82, pulse 70, temperature 97.6 °F (36.4 °C), temperature source Oral, resp. rate 16, height 5' 6\" (1.676 m), weight 122 lb 12.8 oz (55.7 kg), SpO2 95 %. Physical Exam   HENT:   Mouth/Throat: Oropharynx is clear and moist.   Neck: Normal range of motion.  Neck supple. Cardiovascular: Normal rate and regular rhythm. No murmur heard. Pulmonary/Chest: Effort normal and breath sounds normal.   Abdominal: Soft. Bowel sounds are normal.   Hard lump across lower abdomen,    Nursing note and vitals reviewed. ASSESSMENT and PLAN  Diagnoses and all orders for this visit:    1. Abdominal wall lump  -     US ABD LTD;  Future  Pt was given an after visit summary which includes diagnosis, current medicines and vital and voiced understanding of treatment plan

## 2018-07-21 ENCOUNTER — HOSPITAL ENCOUNTER (OUTPATIENT)
Dept: ULTRASOUND IMAGING | Age: 58
Discharge: HOME | End: 2018-07-21
Payer: COMMERCIAL

## 2018-07-21 DIAGNOSIS — R22.2 ABDOMINAL WALL LUMP: ICD-10-CM

## 2018-07-21 PROCEDURE — 76705 ECHO EXAM OF ABDOMEN: CPT

## 2018-07-23 DIAGNOSIS — R19.00 ABDOMINAL MASS, UNSPECIFIED ABDOMINAL LOCATION: Primary | ICD-10-CM

## 2018-07-24 ENCOUNTER — TELEPHONE (OUTPATIENT)
Dept: FAMILY MEDICINE CLINIC | Age: 58
End: 2018-07-24

## 2018-07-24 NOTE — TELEPHONE ENCOUNTER
----- Message from Chantale Justin sent at 7/24/2018  1:07 PM EDT -----  Regarding: PATRICK Hansen/ telephone  Pt stated that she was suppose to heard from someone that is going to schedule her for a Ct or MRI but she has not heard anything yet. Pt would like to know if she can schedule the appt?  Pt's callback: [phone]

## 2018-07-30 ENCOUNTER — HOSPITAL ENCOUNTER (OUTPATIENT)
Dept: CT IMAGING | Age: 58
Discharge: HOME | End: 2018-07-30
Payer: COMMERCIAL

## 2018-07-30 DIAGNOSIS — R19.00 ABDOMINAL MASS, UNSPECIFIED ABDOMINAL LOCATION: ICD-10-CM

## 2018-07-30 PROCEDURE — 74011636320 HC RX REV CODE- 636/320: Performed by: NURSE PRACTITIONER

## 2018-07-30 PROCEDURE — 74177 CT ABD & PELVIS W/CONTRAST: CPT

## 2018-07-30 PROCEDURE — 74011000258 HC RX REV CODE- 258: Performed by: NURSE PRACTITIONER

## 2018-07-30 RX ORDER — SODIUM CHLORIDE 0.9 % (FLUSH) 0.9 %
10 SYRINGE (ML) INJECTION
Status: COMPLETED | OUTPATIENT
Start: 2018-07-30 | End: 2018-07-30

## 2018-07-30 RX ADMIN — Medication 10 ML: at 10:38

## 2018-07-30 RX ADMIN — SODIUM CHLORIDE 100 ML: 900 INJECTION, SOLUTION INTRAVENOUS at 10:38

## 2018-07-30 RX ADMIN — IOHEXOL 50 ML: 240 INJECTION, SOLUTION INTRATHECAL; INTRAVASCULAR; INTRAVENOUS; ORAL at 10:38

## 2018-07-30 RX ADMIN — IOPAMIDOL 100 ML: 755 INJECTION, SOLUTION INTRAVENOUS at 10:38

## 2018-07-31 ENCOUNTER — TELEPHONE (OUTPATIENT)
Dept: FAMILY MEDICINE CLINIC | Age: 58
End: 2018-07-31

## 2018-07-31 DIAGNOSIS — R19.00 PELVIC MASS IN FEMALE: Primary | ICD-10-CM

## 2018-07-31 NOTE — TELEPHONE ENCOUNTER
Called pt back and advised that her CT results will have to be reviewed by PCP and then office will call back tomorrow on it. Please review and advise. Thank you.

## 2018-07-31 NOTE — TELEPHONE ENCOUNTER
Patient is requesting a call back for her CT scan results  Best call back : [phone]  CT Scan : 7/30/18

## 2018-08-01 ENCOUNTER — OFFICE VISIT (OUTPATIENT)
Dept: GYNECOLOGY | Age: 58
End: 2018-08-01

## 2018-08-01 VITALS
HEART RATE: 80 BPM | DIASTOLIC BLOOD PRESSURE: 86 MMHG | WEIGHT: 124.8 LBS | HEIGHT: 70 IN | BODY MASS INDEX: 17.87 KG/M2 | SYSTOLIC BLOOD PRESSURE: 145 MMHG

## 2018-08-01 DIAGNOSIS — R19.00 PELVIC MASS: Primary | ICD-10-CM

## 2018-08-01 RX ORDER — GLUCOSAMINE/CHONDR SU A SOD 750-600 MG
TABLET ORAL
COMMUNITY
End: 2019-04-26

## 2018-08-01 NOTE — TELEPHONE ENCOUNTER
[phone] spoke to patient notified per Dr Damian Zavala mass between pelvis near colon can't differentiate if its malignant or benign per Dr Damian Zavala refer to General surgery Dr Elyse Bautista referral to [facility] urgent to set up appointment

## 2018-08-01 NOTE — PROGRESS NOTES
[address] [place] [address] Mathias Moritz 947, [address] Sara PEPE [phone]  F [phone]    Office Note  Patient ID:  Name:  Kizzy Tomlinson  MRN:  [id number]  :  1960/57 y.o. Date:  2018      HISTORY OF PRESENT ILLNESS:  Kizzy Tomlinson is a 59 y.o.  postmenopausal female who is being seen for a large cystic pelvic mass. She is referred by Dr. Nay Solis. She had presented to her PCP, Dr. Comfort Medina, complaining of lower abdominal fullness. He sent her for an ultrasound which demonstrated a large cystic mass. She then was sent for a CT of the abdomen/pelvis to better evaluate. She was referred to Dr. Sammi Infante, but after reviewing her CT, he felt that this was most likely gynecologic in origin and asked that I see her in consultation. ROS:   and GI review:  Negative  Cardiopulmonary review:  Negative   Musculoskeletal:  Negative    A comprehensive review of systems was negative except for that written in the History of Present Illness. , 10 point ROS      OB/GYN ROS:    There is no history of significant gyn problems or procedures. Patient denies any abnormal bleeding or vaginal discharge.        Problem List:  Patient Active Problem List    Diagnosis Date Noted    Pelvic mass 2018    Hyperlipidemia 2016    Essential hypertension 2016    Osteoporosis, post-menopausal 10/01/2015    GERD (gastroesophageal reflux disease) 10/26/2010    AR (allergic rhinitis) 10/26/2010     PMH:  Past Medical History:   Diagnosis Date    Abnormal Pap smear     h/o    Allergic rhinitis due to other allergen     Esophageal reflux     Herpes zoster     Mixed hyperlipidemia     Osteopenia     Other atopic dermatitis and related conditions     Shingles rash 10/2015    small spot on left shoulder    Unspecified spontaneous  without mention of complication     X2      PSH:  Past Surgical History:   Procedure Laterality Date    COLPOSCOPY 29 y/o    with cryo    DILATION AND CURETTAGE      HX COLONOSCOPY  2001    HX WRIST FRACTURE TX      with repair      Social History:  Social History   Substance Use Topics    Smoking status: Never Smoker    Smokeless tobacco: Never Used    Alcohol use No      Family History:  Family History   Problem Relation Age of Onset    Hypertension Mother     Heart Disease Mother      CHF    Cancer Mother      kidney    Heart Attack Father     Coronary Artery Disease Father      with pacemaker    Asthma Father     Heart Disease Father      pacemaker    Stroke Maternal Grandmother     Heart Attack Paternal Grandfather       Medications: (reviewed)  Current Outpatient Prescriptions   Medication Sig    Biotin 2,500 mcg cap Take  by mouth.  losartan (COZAAR) 50 mg tablet TAKE 1 TAB BY MOUTH DAILY.  cholecalciferol, vitamin D3, 2,000 unit tab Take 2,000 Units by mouth daily.  B.infantis-B.ani-B.long-B.bifi (PROBIOTIC 4X) 10-15 mg TbEC Take  by mouth.  diphenhydrAMINE (BENADRYL) 25 mg capsule Take 25 mg by mouth every six (6) hours as needed.  CALCIUM CARBONATE/VITAMIN D3 (CALCIUM + D PO) Take  by mouth. Total 770 mg daily calcium and 1000 units vitamin d    MULTIVITAMIN PO Take 1 Tab by mouth daily. No current facility-administered medications for this visit. Allergies: (reviewed)  No Known Allergies       OBJECTIVE:    Physical Exam:  VITAL SIGNS: Vitals:    08/01/18 1445 08/01/18 1451   BP: (!) 136/98 145/86   Pulse: 77 80   Weight: 124 lb 12.8 oz (56.6 kg)    Height: 5' 9.57\" (1.767 m)      Body mass index is 18.13 kg/(m^2). GENERAL DAVE: Conversant, alert, oriented. No acute distress. HEENT: HEENT. No thyroid enlargement. No JVD. Neck: Supple without restrictions. RESPIRATORY: Clear to auscultation and percussion to the bases. No CVAT. CARDIOVASC: RRR without murmur/rub. GASTROINT: Soft, non-tender. Mass filling lower abdomen.      MUSCULOSKEL: no joint tenderness, deformity or swelling   EXTREMITIES: extremities normal, atraumatic, no cyanosis or edema   PELVIC: Normal external genitalia. Normal vagina. Normal cervix. Uterus displaced anteriorly and to the right. Smooth mass filling the cul de sac. The mass was somewhat mobile. No nodularity. RECTAL: Deferred   SOUMYA SURVEY: No suspicious lymphadenopathy or edema noted. NEURO: Grossly intact. No acute deficit. Lab Results   Component Value Date/Time    WBC 4.7 12/30/2017 10:16 AM    HGB 13.2 12/30/2017 10:16 AM    HCT 40.3 12/30/2017 10:16 AM    PLATELET 158 87/28/4409 10:16 AM    MCV 90 12/30/2017 10:16 AM     Lab Results   Component Value Date/Time    Sodium 139 12/30/2017 10:16 AM    Potassium 4.1 12/30/2017 10:16 AM    Chloride 99 12/30/2017 10:16 AM    CO2 29 12/30/2017 10:16 AM    Anion gap 9 10/26/2010 12:24 PM    Glucose 86 12/30/2017 10:16 AM    BUN 13 12/30/2017 10:16 AM    Creatinine 0.85 12/30/2017 10:16 AM    BUN/Creatinine ratio 15 12/30/2017 10:16 AM    GFR est AA 87 12/30/2017 10:16 AM    GFR est non-AA 75 12/30/2017 10:16 AM    Calcium 9.4 12/30/2017 10:16 AM       Pelvic ultrasound (7/21/18)  In the area of clinical concern is a very large cystic structure deep  to the anterior abdominal wall measuring 18.9 x 12.8 x 10.6 cm in size with an  estimated volume of 13 31 mL which is seen separate from the bladder wall. Its  origin is uncertain. Diagnostic possibilities include ovary, mesentery, and  bowel. No Doppler flow within the cystic lesion is shown. Flow adjacent to the  cystic structure is shown circumferentially which is of uncertain origin,  possibly within adjacent vessels as opposed to flow within the wall of the  lesion.     IMPRESSION: Large cystic mass measuring up to nearly 20 cm in size. Further  evaluation with contrast enhanced CT of the abdomen and pelvis is recommended. Depending upon CT imaging results, further evaluation with MRI may be required.       CT of abdomen/pelvis (7/30/18)  LUNG BASES: Clear. INCIDENTALLY IMAGED HEART AND MEDIASTINUM: Unremarkable. LIVER: There is a well demarcated hypodensity in the dome measuring 2.6 x 2.2  cm. There is a larger well demarcated hypodensity in the right lobe measuring  roughly 4.4 x 4.2 cm possibly representing a hemangioma. GALLBLADDER: No gallstones. Possible 4 mm polyp. SPLEEN: No mass. PANCREAS: No mass or ductal dilatation. ADRENALS: Unremarkable. KIDNEYS: No mass, calculus, or hydronephrosis. STOMACH: Unremarkable. SMALL BOWEL: No dilatation or wall thickening. COLON: No dilatation or wall thickening. APPENDIX: Unremarkable. PERITONEUM: Images through the pelvis reveal a massive cystic appearing  structure measuring roughly 15.3 x 12.2 cm. RETROPERITONEUM: No lymphadenopathy or aortic aneurysm. REPRODUCTIVE ORGANS: Large cystic mass. URINARY BLADDER: No mass or calculus. BONES: No destructive bone lesion. ADDITIONAL COMMENTS: N/A     IMPRESSION:  Multiple hypodense lesions within the liver likely hemangiomas. Three-phase  liver study would be helpful to confirm if indicated clinically.     Possible tiny gallbladder polyp.     Massive cystic appearing structure with epicenter in the pelvis interposed  between the uterus and rectosigmoid region. Differential considerations would  include large adnexal cyst, cystic neoplasm although no definite soft tissue  component, congenital cystic abnormality, etc.      IMPRESSION/PLAN:  Leeann Adams is a 59 y.o. female with a working diagnosis of cystic pelvic mass. I reviewed with Leeann Adams her medical records, physical exam, and review of symptoms. I discussed with her and her  the differential diagnosis, including both benign and malignant conditions. I reviewed the CT images with them and explained the findings. The mass appears to be coming from the left adnexa. I can see a normal right ovary and the mass pushes the uterus upward and to the right.   I feel that the mass is most likely benign, since it is unilocular and there are no solid components or septations. I recommended surgical excision with frozen section pathology. Based upon the size of the mass, ~20 x 15 x 12 cm, she is not a candidate for laparoscopic removal.  I have recommended laparotomy. She was counseled on the risks, benefits, indications, and alternatives of surgery. Her questions were answered and she wishes to proceed.         Signed By: Miguel Justice MD     8/1/2018/2:52 PM

## 2018-08-01 NOTE — LETTER
2018 3:49 PM 
 
Patient:  Molly Perez YOB: 1960 Date of Visit: 2018 Dear Lyndon Huerta MD 
[address] [place] [address]  [phone] [id number] VIA In Basket Nieves King MD 
[facility] [phone] VIA In Basket 
 : Thank you for referring Ms. Molly Perez to me for evaluation/treatment. Below are the relevant portions of my assessment and plan of care. New patient referred by Messi Hebert for abdominal mass. Pt c/o feeling abdominal fullness. [address], [address] [place], [address] 
P [phone]  F [phone] Office Note Patient ID: 
Name:  Molly Preez MRN:  [id number] :  1960/57 y.o. Date:  2018 HISTORY OF PRESENT ILLNESS: 
Molly Perez is a 59 y.o.  postmenopausal female who is being seen for a large cystic pelvic mass. She is referred by Dr. General Whitlock. She had presented to her PCP, Dr. Robert Alvarez, complaining of lower abdominal fullness. He sent her for an ultrasound which demonstrated a large cystic mass. She then was sent for a CT of the abdomen/pelvis to better evaluate. She was referred to Dr. Mati Rodriguez, but after reviewing her CT, he felt that this was most likely gynecologic in origin and asked that I see her in consultation. ROS: 
 and GI review:  Negative Cardiopulmonary review:  Negative Musculoskeletal:  Negative A comprehensive review of systems was negative except for that written in the History of Present Illness. , 10 point ROS 
 
 
OB/GYN ROS: 
[id number] There is no history of significant gyn problems or procedures. Patient denies any abnormal bleeding or vaginal discharge. Problem List: 
Patient Active Problem List  
 Diagnosis Date Noted  Pelvic mass 2018  Hyperlipidemia 2016  Essential hypertension 2016  Osteoporosis, post-menopausal 10/01/2015  GERD (gastroesophageal reflux disease) 10/26/2010  AR (allergic rhinitis) 10/26/2010 PMH: 
Past Medical History:  
Diagnosis Date  Abnormal Pap smear   
 h/o  Allergic rhinitis due to other allergen  Esophageal reflux  Herpes zoster  Mixed hyperlipidemia  Osteopenia  Other atopic dermatitis and related conditions  Shingles rash 10/2015  
 small spot on left shoulder  Unspecified spontaneous  without mention of complication X2  
  
PSH: 
Past Surgical History:  
Procedure Laterality Date  COLPOSCOPY  29 y/o  
 with cryo  DILATION AND CURETTAGE    
 HX COLONOSCOPY    HX WRIST FRACTURE TX    
 with repair Social History: 
Social History Substance Use Topics  Smoking status: Never Smoker  Smokeless tobacco: Never Used  Alcohol use No  
  
Family History: 
Family History Problem Relation Age of Onset  Hypertension Mother  Heart Disease Mother CHF  Cancer Mother   
  kidney  Heart Attack Father  Coronary Artery Disease Father   
  with pacemaker  Asthma Father  Heart Disease Father   
  pacemaker  Stroke Maternal Grandmother  Heart Attack Paternal Grandfather Medications: (reviewed) Current Outpatient Prescriptions Medication Sig  Biotin 2,500 mcg cap Take  by mouth.  losartan (COZAAR) 50 mg tablet TAKE 1 TAB BY MOUTH DAILY.  cholecalciferol, vitamin D3, 2,000 unit tab Take 2,000 Units by mouth daily.  B.infantis-B.ani-B.long-B.bifi (PROBIOTIC 4X) 10-15 mg TbEC Take  by mouth.  diphenhydrAMINE (BENADRYL) 25 mg capsule Take 25 mg by mouth every six (6) hours as needed.  CALCIUM CARBONATE/VITAMIN D3 (CALCIUM + D PO) Take  by mouth. Total 770 mg daily calcium and 1000 units vitamin d  MULTIVITAMIN PO Take 1 Tab by mouth daily. No current facility-administered medications for this visit. Allergies: (reviewed) No Known Allergies OBJECTIVE: 
 
Physical Exam: VITAL SIGNS: Vitals:  
 18 1445 18 1451 BP: (!) 136/98 145/86 Pulse: 77 80 Weight: 124 lb 12.8 oz (56.6 kg) Height: 5' 9.57\" (1.767 m) Body mass index is 18.13 kg/(m^2). GENERAL DAVE: Conversant, alert, oriented. No acute distress. HEENT: HEENT. No thyroid enlargement. No JVD. Neck: Supple without restrictions. RESPIRATORY: Clear to auscultation and percussion to the bases. No CVAT. CARDIOVASC: RRR without murmur/rub. GASTROINT: Soft, non-tender. Mass filling lower abdomen. MUSCULOSKEL: no joint tenderness, deformity or swelling EXTREMITIES: extremities normal, atraumatic, no cyanosis or edema PELVIC: Normal external genitalia. Normal vagina. Normal cervix. Uterus displaced anteriorly and to the right. Smooth mass filling the cul de sac. The mass was somewhat mobile. No nodularity. RECTAL: Deferred SOUMYA SURVEY: No suspicious lymphadenopathy or edema noted. NEURO: Grossly intact. No acute deficit. Lab Results Component Value Date/Time WBC 4.7 12/30/2017 10:16 AM  
 HGB 13.2 12/30/2017 10:16 AM  
 HCT 40.3 12/30/2017 10:16 AM  
 PLATELET 263 10/23/9575 10:16 AM  
 MCV 90 12/30/2017 10:16 AM  
 
Lab Results Component Value Date/Time Sodium 139 12/30/2017 10:16 AM  
 Potassium 4.1 12/30/2017 10:16 AM  
 Chloride 99 12/30/2017 10:16 AM  
 CO2 29 12/30/2017 10:16 AM  
 Anion gap 9 10/26/2010 12:24 PM  
 Glucose 86 12/30/2017 10:16 AM  
 BUN 13 12/30/2017 10:16 AM  
 Creatinine 0.85 12/30/2017 10:16 AM  
 BUN/Creatinine ratio 15 12/30/2017 10:16 AM  
 GFR est AA 87 12/30/2017 10:16 AM  
 GFR est non-AA 75 12/30/2017 10:16 AM  
 Calcium 9.4 12/30/2017 10:16 AM  
 
 
Pelvic ultrasound (7/21/18) In the area of clinical concern is a very large cystic structure deep 
to the anterior abdominal wall measuring 18.9 x 12.8 x 10.6 cm in size with an 
estimated volume of 13 31 mL which is seen separate from the bladder wall. Its 
origin is uncertain.  Diagnostic possibilities include ovary, mesentery, and 
 bowel. No Doppler flow within the cystic lesion is shown. Flow adjacent to the 
cystic structure is shown circumferentially which is of uncertain origin, 
possibly within adjacent vessels as opposed to flow within the wall of the 
lesion. 
  
IMPRESSION: Large cystic mass measuring up to nearly 20 cm in size. Further 
evaluation with contrast enhanced CT of the abdomen and pelvis is recommended. Depending upon CT imaging results, further evaluation with MRI may be required. CT of abdomen/pelvis (7/30/18) LUNG BASES: Clear. INCIDENTALLY IMAGED HEART AND MEDIASTINUM: Unremarkable. LIVER: There is a well demarcated hypodensity in the dome measuring 2.6 x 2.2 
cm. There is a larger well demarcated hypodensity in the right lobe measuring 
roughly 4.4 x 4.2 cm possibly representing a hemangioma. GALLBLADDER: No gallstones. Possible 4 mm polyp. SPLEEN: No mass. PANCREAS: No mass or ductal dilatation. ADRENALS: Unremarkable. KIDNEYS: No mass, calculus, or hydronephrosis. STOMACH: Unremarkable. SMALL BOWEL: No dilatation or wall thickening. COLON: No dilatation or wall thickening. APPENDIX: Unremarkable. PERITONEUM: Images through the pelvis reveal a massive cystic appearing 
structure measuring roughly 15.3 x 12.2 cm. RETROPERITONEUM: No lymphadenopathy or aortic aneurysm. REPRODUCTIVE ORGANS: Large cystic mass. URINARY BLADDER: No mass or calculus. BONES: No destructive bone lesion. ADDITIONAL COMMENTS: N/A 
  
IMPRESSION: 
Multiple hypodense lesions within the liver likely hemangiomas. Three-phase 
liver study would be helpful to confirm if indicated clinically. 
  
Possible tiny gallbladder polyp. 
  
Massive cystic appearing structure with epicenter in the pelvis interposed 
between the uterus and rectosigmoid region.  Differential considerations would 
include large adnexal cyst, cystic neoplasm although no definite soft tissue 
component, congenital cystic abnormality, etc. 
 
 
 IMPRESSION/PLAN: 
Sol Cornejo is a 59 y.o. female with a working diagnosis of cystic pelvic mass. I reviewed with Sol Cornejo her medical records, physical exam, and review of symptoms. I discussed with her and her  the differential diagnosis, including both benign and malignant conditions. I reviewed the CT images with them and explained the findings. The mass appears to be coming from the left adnexa. I can see a normal right ovary and the mass pushes the uterus upward and to the right. I feel that the mass is most likely benign, since it is unilocular and there are no solid components or septations. I recommended surgical excision with frozen section pathology. Based upon the size of the mass, ~20 x 15 x 12 cm, she is not a candidate for laparoscopic removal.  I have recommended laparotomy. She was counseled on the risks, benefits, indications, and alternatives of surgery. Her questions were answered and she wishes to proceed. Signed By: Delores Pederson MD   
 8/1/2018/2:52 PM  
 
 
 
If you have questions, please do not hesitate to call me. I look forward to following Ms. Lindsay Mendoza along with you.  
 
 
 
Sincerely, 
 
 
Delores Pederson MD

## 2018-08-07 ENCOUNTER — HOSPITAL ENCOUNTER (OUTPATIENT)
Dept: PREADMISSION TESTING | Age: 58
Discharge: HOME | End: 2018-08-07
Payer: COMMERCIAL

## 2018-08-07 ENCOUNTER — HOSPITAL ENCOUNTER (OUTPATIENT)
Dept: GENERAL RADIOLOGY | Age: 58
Discharge: HOME | End: 2018-08-07
Payer: COMMERCIAL

## 2018-08-07 VITALS
SYSTOLIC BLOOD PRESSURE: 114 MMHG | TEMPERATURE: 98 F | HEART RATE: 70 BPM | DIASTOLIC BLOOD PRESSURE: 74 MMHG | WEIGHT: 122 LBS | BODY MASS INDEX: 19.61 KG/M2 | HEIGHT: 66 IN

## 2018-08-07 LAB
ALBUMIN SERPL-MCNC: 4 G/DL (ref 3.5–5)
ALBUMIN/GLOB SERPL: 1.4 {RATIO} (ref 1.1–2.2)
ALP SERPL-CCNC: 52 U/L (ref 45–117)
ALT SERPL-CCNC: 20 U/L (ref 12–78)
ANION GAP SERPL CALC-SCNC: 8 MMOL/L (ref 5–15)
AST SERPL-CCNC: 18 U/L (ref 15–37)
BASOPHILS # BLD: 0 K/UL (ref 0–0.1)
BASOPHILS NFR BLD: 1 % (ref 0–1)
BILIRUB SERPL-MCNC: 0.5 MG/DL (ref 0.2–1)
BUN SERPL-MCNC: 16 MG/DL (ref 6–20)
BUN/CREAT SERPL: 19 (ref 12–20)
CALCIUM SERPL-MCNC: 9 MG/DL (ref 8.5–10.1)
CANCER AG125 SERPL-ACNC: 19 U/ML (ref 1.5–35)
CEA SERPL-MCNC: 2.8 NG/ML
CHLORIDE SERPL-SCNC: 102 MMOL/L (ref 97–108)
CO2 SERPL-SCNC: 30 MMOL/L (ref 21–32)
CREAT SERPL-MCNC: 0.83 MG/DL (ref 0.55–1.02)
DIFFERENTIAL METHOD BLD: NORMAL
EOSINOPHIL # BLD: 0.1 K/UL (ref 0–0.4)
EOSINOPHIL NFR BLD: 1 % (ref 0–7)
ERYTHROCYTE [DISTWIDTH] IN BLOOD BY AUTOMATED COUNT: 12.5 % (ref 11.5–14.5)
GLOBULIN SER CALC-MCNC: 2.8 G/DL (ref 2–4)
GLUCOSE SERPL-MCNC: 79 MG/DL (ref 65–100)
HCT VFR BLD AUTO: 40.2 % (ref 35–47)
HGB BLD-MCNC: 13 G/DL (ref 11.5–16)
IMM GRANULOCYTES # BLD: 0 K/UL (ref 0–0.04)
IMM GRANULOCYTES NFR BLD AUTO: 0 % (ref 0–0.5)
LYMPHOCYTES # BLD: 1.3 K/UL (ref 0.8–3.5)
LYMPHOCYTES NFR BLD: 26 % (ref 12–49)
MCH RBC QN AUTO: 30.3 PG (ref 26–34)
MCHC RBC AUTO-ENTMCNC: 32.3 G/DL (ref 30–36.5)
MCV RBC AUTO: 93.7 FL (ref 80–99)
MONOCYTES # BLD: 0.3 K/UL (ref 0–1)
MONOCYTES NFR BLD: 7 % (ref 5–13)
NEUTS SEG # BLD: 3.4 K/UL (ref 1.8–8)
NEUTS SEG NFR BLD: 66 % (ref 32–75)
NRBC # BLD: 0 K/UL (ref 0–0.01)
NRBC BLD-RTO: 0 PER 100 WBC
PLATELET # BLD AUTO: 197 K/UL (ref 150–400)
PMV BLD AUTO: 9.2 FL (ref 8.9–12.9)
POTASSIUM SERPL-SCNC: 4 MMOL/L (ref 3.5–5.1)
PROT SERPL-MCNC: 6.8 G/DL (ref 6.4–8.2)
RBC # BLD AUTO: 4.29 M/UL (ref 3.8–5.2)
SODIUM SERPL-SCNC: 140 MMOL/L (ref 136–145)
WBC # BLD AUTO: 5.2 K/UL (ref 3.6–11)

## 2018-08-07 PROCEDURE — 85025 COMPLETE CBC W/AUTO DIFF WBC: CPT | Performed by: OBSTETRICS & GYNECOLOGY

## 2018-08-07 PROCEDURE — 82378 CARCINOEMBRYONIC ANTIGEN: CPT | Performed by: OBSTETRICS & GYNECOLOGY

## 2018-08-07 PROCEDURE — 71046 X-RAY EXAM CHEST 2 VIEWS: CPT

## 2018-08-07 PROCEDURE — 80053 COMPREHEN METABOLIC PANEL: CPT | Performed by: OBSTETRICS & GYNECOLOGY

## 2018-08-07 PROCEDURE — 36415 COLL VENOUS BLD VENIPUNCTURE: CPT | Performed by: OBSTETRICS & GYNECOLOGY

## 2018-08-07 PROCEDURE — 86304 IMMUNOASSAY TUMOR CA 125: CPT | Performed by: OBSTETRICS & GYNECOLOGY

## 2018-08-07 PROCEDURE — 93005 ELECTROCARDIOGRAM TRACING: CPT

## 2018-08-07 NOTE — PERIOP NOTES
PATIENT GIVEN SURGICAL SITE INFECTION FAQ HANDOUT AND HAND WASHING TIP SHEET. PREOP INSTRUCTIONS REVIEWED AND PATIENT VERBALIZES UNDERSTANDING OF INSTRUCTIONS. PATIENT HAS BEEN GIVEN THE OPPORTUNITY TO ASK ADDITIONAL QUESTIONS. GAVE PATIENT 2 PACKAGES OF 6 CHG WIPES AND INSTRUCTIONS. PATIENT VERBALIZED UNDERSTANDING.

## 2018-08-08 LAB
ATRIAL RATE: 65 BPM
CALCULATED P AXIS, ECG09: -2 DEGREES
CALCULATED R AXIS, ECG10: 59 DEGREES
CALCULATED T AXIS, ECG11: 45 DEGREES
DIAGNOSIS, 93000: NORMAL
P-R INTERVAL, ECG05: 140 MS
Q-T INTERVAL, ECG07: 426 MS
QRS DURATION, ECG06: 80 MS
QTC CALCULATION (BEZET), ECG08: 443 MS
VENTRICULAR RATE, ECG03: 65 BPM

## 2018-08-13 ENCOUNTER — ANESTHESIA EVENT (OUTPATIENT)
Dept: SURGERY | Age: 58
DRG: 743 | End: 2018-08-13
Payer: COMMERCIAL

## 2018-08-13 ENCOUNTER — ANESTHESIA (OUTPATIENT)
Dept: SURGERY | Age: 58
DRG: 743 | End: 2018-08-13
Payer: COMMERCIAL

## 2018-08-13 ENCOUNTER — HOSPITAL ENCOUNTER (INPATIENT)
Age: 58
LOS: 2 days | Discharge: HOME | DRG: 743 | End: 2018-08-15
Attending: OBSTETRICS & GYNECOLOGY | Admitting: OBSTETRICS & GYNECOLOGY
Payer: COMMERCIAL

## 2018-08-13 DIAGNOSIS — G89.18 ACUTE POST-OPERATIVE PAIN: Primary | ICD-10-CM

## 2018-08-13 PROCEDURE — 74011250637 HC RX REV CODE- 250/637

## 2018-08-13 PROCEDURE — 77030032490 HC SLV COMPR SCD KNE COVD -B: Performed by: OBSTETRICS & GYNECOLOGY

## 2018-08-13 PROCEDURE — 74011000258 HC RX REV CODE- 258: Performed by: OBSTETRICS & GYNECOLOGY

## 2018-08-13 PROCEDURE — 77030002966 HC SUT PDS J&J -A: Performed by: OBSTETRICS & GYNECOLOGY

## 2018-08-13 PROCEDURE — 74011250637 HC RX REV CODE- 250/637: Performed by: OBSTETRICS & GYNECOLOGY

## 2018-08-13 PROCEDURE — 74011250636 HC RX REV CODE- 250/636

## 2018-08-13 PROCEDURE — 74011000250 HC RX REV CODE- 250: Performed by: OBSTETRICS & GYNECOLOGY

## 2018-08-13 PROCEDURE — 77030011264 HC ELECTRD BLD EXT COVD -A: Performed by: OBSTETRICS & GYNECOLOGY

## 2018-08-13 PROCEDURE — 88307 TISSUE EXAM BY PATHOLOGIST: CPT | Performed by: OBSTETRICS & GYNECOLOGY

## 2018-08-13 PROCEDURE — 77030018846 HC SOL IRR STRL H20 ICUM -A: Performed by: OBSTETRICS & GYNECOLOGY

## 2018-08-13 PROCEDURE — 0UT20ZZ RESECTION OF BILATERAL OVARIES, OPEN APPROACH: ICD-10-PCS | Performed by: OBSTETRICS & GYNECOLOGY

## 2018-08-13 PROCEDURE — 74011000250 HC RX REV CODE- 250

## 2018-08-13 PROCEDURE — 76060000035 HC ANESTHESIA 2 TO 2.5 HR: Performed by: OBSTETRICS & GYNECOLOGY

## 2018-08-13 PROCEDURE — 77030013079 HC BLNKT BAIR HGGR 3M -A: Performed by: ANESTHESIOLOGY

## 2018-08-13 PROCEDURE — 77030039266 HC ADH SKN EXOFIN S2SG -A: Performed by: OBSTETRICS & GYNECOLOGY

## 2018-08-13 PROCEDURE — 88331 PATH CONSLTJ SURG 1 BLK 1SPC: CPT | Performed by: OBSTETRICS & GYNECOLOGY

## 2018-08-13 PROCEDURE — 77030026438 HC STYL ET INTUB CARD -A: Performed by: ANESTHESIOLOGY

## 2018-08-13 PROCEDURE — 77030031139 HC SUT VCRL2 J&J -A: Performed by: OBSTETRICS & GYNECOLOGY

## 2018-08-13 PROCEDURE — 77030020782 HC GWN BAIR PAWS FLX 3M -B

## 2018-08-13 PROCEDURE — 65410000002 HC RM PRIVATE OB

## 2018-08-13 PROCEDURE — 88112 CYTOPATH CELL ENHANCE TECH: CPT | Performed by: OBSTETRICS & GYNECOLOGY

## 2018-08-13 PROCEDURE — 88305 TISSUE EXAM BY PATHOLOGIST: CPT | Performed by: OBSTETRICS & GYNECOLOGY

## 2018-08-13 PROCEDURE — 0UT70ZZ RESECTION OF BILATERAL FALLOPIAN TUBES, OPEN APPROACH: ICD-10-PCS | Performed by: OBSTETRICS & GYNECOLOGY

## 2018-08-13 PROCEDURE — 76010000131 HC OR TIME 2 TO 2.5 HR: Performed by: OBSTETRICS & GYNECOLOGY

## 2018-08-13 PROCEDURE — 77030008684 HC TU ET CUF COVD -B: Performed by: ANESTHESIOLOGY

## 2018-08-13 PROCEDURE — 77030002933 HC SUT MCRYL J&J -A: Performed by: OBSTETRICS & GYNECOLOGY

## 2018-08-13 PROCEDURE — 0UT90ZZ RESECTION OF UTERUS, OPEN APPROACH: ICD-10-PCS | Performed by: OBSTETRICS & GYNECOLOGY

## 2018-08-13 PROCEDURE — 77030018836 HC SOL IRR NACL ICUM -A: Performed by: OBSTETRICS & GYNECOLOGY

## 2018-08-13 PROCEDURE — P9045 ALBUMIN (HUMAN), 5%, 250 ML: HCPCS

## 2018-08-13 PROCEDURE — 74011250636 HC RX REV CODE- 250/636: Performed by: OBSTETRICS & GYNECOLOGY

## 2018-08-13 PROCEDURE — 76210000006 HC OR PH I REC 0.5 TO 1 HR: Performed by: OBSTETRICS & GYNECOLOGY

## 2018-08-13 PROCEDURE — 77030034696 HC CATH URETH FOL 2W BARD -A: Performed by: OBSTETRICS & GYNECOLOGY

## 2018-08-13 PROCEDURE — 77030011640 HC PAD GRND REM COVD -A: Performed by: OBSTETRICS & GYNECOLOGY

## 2018-08-13 PROCEDURE — 74011250636 HC RX REV CODE- 250/636: Performed by: ANESTHESIOLOGY

## 2018-08-13 RX ORDER — OXYCODONE AND ACETAMINOPHEN 5; 325 MG/1; MG/1
1 TABLET ORAL AS NEEDED
Status: DISCONTINUED | OUTPATIENT
Start: 2018-08-13 | End: 2018-08-13 | Stop reason: HOSPADM

## 2018-08-13 RX ORDER — PROCHLORPERAZINE EDISYLATE 5 MG/ML
10 INJECTION INTRAMUSCULAR; INTRAVENOUS
Status: DISCONTINUED | OUTPATIENT
Start: 2018-08-13 | End: 2018-08-15 | Stop reason: HOSPADM

## 2018-08-13 RX ORDER — ENOXAPARIN SODIUM 100 MG/ML
40 INJECTION SUBCUTANEOUS EVERY 24 HOURS
Status: DISCONTINUED | OUTPATIENT
Start: 2018-08-13 | End: 2018-08-15 | Stop reason: HOSPADM

## 2018-08-13 RX ORDER — ACETAMINOPHEN 10 MG/ML
INJECTION, SOLUTION INTRAVENOUS AS NEEDED
Status: DISCONTINUED | OUTPATIENT
Start: 2018-08-13 | End: 2018-08-13 | Stop reason: HOSPADM

## 2018-08-13 RX ORDER — SODIUM CHLORIDE, SODIUM LACTATE, POTASSIUM CHLORIDE, CALCIUM CHLORIDE 600; 310; 30; 20 MG/100ML; MG/100ML; MG/100ML; MG/100ML
75 INJECTION, SOLUTION INTRAVENOUS CONTINUOUS
Status: DISCONTINUED | OUTPATIENT
Start: 2018-08-13 | End: 2018-08-13 | Stop reason: HOSPADM

## 2018-08-13 RX ORDER — MIDAZOLAM HYDROCHLORIDE 1 MG/ML
0.5 INJECTION, SOLUTION INTRAMUSCULAR; INTRAVENOUS
Status: DISCONTINUED | OUTPATIENT
Start: 2018-08-13 | End: 2018-08-13 | Stop reason: HOSPADM

## 2018-08-13 RX ORDER — LIDOCAINE HYDROCHLORIDE 20 MG/ML
INJECTION, SOLUTION EPIDURAL; INFILTRATION; INTRACAUDAL; PERINEURAL AS NEEDED
Status: DISCONTINUED | OUTPATIENT
Start: 2018-08-13 | End: 2018-08-13 | Stop reason: HOSPADM

## 2018-08-13 RX ORDER — MIDAZOLAM HYDROCHLORIDE 1 MG/ML
INJECTION, SOLUTION INTRAMUSCULAR; INTRAVENOUS AS NEEDED
Status: DISCONTINUED | OUTPATIENT
Start: 2018-08-13 | End: 2018-08-13 | Stop reason: HOSPADM

## 2018-08-13 RX ORDER — KETOROLAC TROMETHAMINE 30 MG/ML
INJECTION, SOLUTION INTRAMUSCULAR; INTRAVENOUS AS NEEDED
Status: DISCONTINUED | OUTPATIENT
Start: 2018-08-13 | End: 2018-08-13 | Stop reason: HOSPADM

## 2018-08-13 RX ORDER — DIPHENHYDRAMINE HYDROCHLORIDE 50 MG/ML
12.5 INJECTION, SOLUTION INTRAMUSCULAR; INTRAVENOUS AS NEEDED
Status: DISCONTINUED | OUTPATIENT
Start: 2018-08-13 | End: 2018-08-13 | Stop reason: HOSPADM

## 2018-08-13 RX ORDER — LIDOCAINE HYDROCHLORIDE 10 MG/ML
0.1 INJECTION, SOLUTION EPIDURAL; INFILTRATION; INTRACAUDAL; PERINEURAL AS NEEDED
Status: DISCONTINUED | OUTPATIENT
Start: 2018-08-13 | End: 2018-08-13 | Stop reason: HOSPADM

## 2018-08-13 RX ORDER — MIDAZOLAM HYDROCHLORIDE 1 MG/ML
1 INJECTION, SOLUTION INTRAMUSCULAR; INTRAVENOUS AS NEEDED
Status: DISCONTINUED | OUTPATIENT
Start: 2018-08-13 | End: 2018-08-13 | Stop reason: HOSPADM

## 2018-08-13 RX ORDER — GLYCOPYRROLATE 0.2 MG/ML
INJECTION INTRAMUSCULAR; INTRAVENOUS AS NEEDED
Status: DISCONTINUED | OUTPATIENT
Start: 2018-08-13 | End: 2018-08-13 | Stop reason: HOSPADM

## 2018-08-13 RX ORDER — NALOXONE HYDROCHLORIDE 0.4 MG/ML
0.4 INJECTION, SOLUTION INTRAMUSCULAR; INTRAVENOUS; SUBCUTANEOUS AS NEEDED
Status: DISCONTINUED | OUTPATIENT
Start: 2018-08-13 | End: 2018-08-15 | Stop reason: HOSPADM

## 2018-08-13 RX ORDER — EPHEDRINE SULFATE 50 MG/ML
INJECTION, SOLUTION INTRAVENOUS AS NEEDED
Status: DISCONTINUED | OUTPATIENT
Start: 2018-08-13 | End: 2018-08-13 | Stop reason: HOSPADM

## 2018-08-13 RX ORDER — SUCCINYLCHOLINE CHLORIDE 20 MG/ML
INJECTION INTRAMUSCULAR; INTRAVENOUS AS NEEDED
Status: DISCONTINUED | OUTPATIENT
Start: 2018-08-13 | End: 2018-08-13 | Stop reason: HOSPADM

## 2018-08-13 RX ORDER — HYDROMORPHONE HYDROCHLORIDE 2 MG/ML
INJECTION, SOLUTION INTRAMUSCULAR; INTRAVENOUS; SUBCUTANEOUS AS NEEDED
Status: DISCONTINUED | OUTPATIENT
Start: 2018-08-13 | End: 2018-08-13 | Stop reason: HOSPADM

## 2018-08-13 RX ORDER — SODIUM CHLORIDE 0.9 % (FLUSH) 0.9 %
5-10 SYRINGE (ML) INJECTION AS NEEDED
Status: DISCONTINUED | OUTPATIENT
Start: 2018-08-13 | End: 2018-08-13 | Stop reason: HOSPADM

## 2018-08-13 RX ORDER — SODIUM CHLORIDE 9 MG/ML
50 INJECTION, SOLUTION INTRAVENOUS CONTINUOUS
Status: DISCONTINUED | OUTPATIENT
Start: 2018-08-13 | End: 2018-08-13 | Stop reason: HOSPADM

## 2018-08-13 RX ORDER — LORAZEPAM 2 MG/ML
1 INJECTION INTRAMUSCULAR
Status: DISCONTINUED | OUTPATIENT
Start: 2018-08-13 | End: 2018-08-15 | Stop reason: HOSPADM

## 2018-08-13 RX ORDER — ROCURONIUM BROMIDE 10 MG/ML
INJECTION, SOLUTION INTRAVENOUS AS NEEDED
Status: DISCONTINUED | OUTPATIENT
Start: 2018-08-13 | End: 2018-08-13 | Stop reason: HOSPADM

## 2018-08-13 RX ORDER — PHENYLEPHRINE HCL IN 0.9% NACL 0.4MG/10ML
SYRINGE (ML) INTRAVENOUS AS NEEDED
Status: DISCONTINUED | OUTPATIENT
Start: 2018-08-13 | End: 2018-08-13 | Stop reason: HOSPADM

## 2018-08-13 RX ORDER — SODIUM CHLORIDE 0.9 % (FLUSH) 0.9 %
5-10 SYRINGE (ML) INJECTION EVERY 8 HOURS
Status: DISCONTINUED | OUTPATIENT
Start: 2018-08-13 | End: 2018-08-13 | Stop reason: HOSPADM

## 2018-08-13 RX ORDER — DEXMEDETOMIDINE HYDROCHLORIDE 4 UG/ML
INJECTION, SOLUTION INTRAVENOUS AS NEEDED
Status: DISCONTINUED | OUTPATIENT
Start: 2018-08-13 | End: 2018-08-13 | Stop reason: HOSPADM

## 2018-08-13 RX ORDER — MORPHINE SULFATE 10 MG/ML
2 INJECTION, SOLUTION INTRAMUSCULAR; INTRAVENOUS
Status: DISCONTINUED | OUTPATIENT
Start: 2018-08-13 | End: 2018-08-13 | Stop reason: HOSPADM

## 2018-08-13 RX ORDER — SCOLOPAMINE TRANSDERMAL SYSTEM 1 MG/1
PATCH, EXTENDED RELEASE TRANSDERMAL AS NEEDED
Status: DISCONTINUED | OUTPATIENT
Start: 2018-08-13 | End: 2018-08-13 | Stop reason: HOSPADM

## 2018-08-13 RX ORDER — KETOROLAC TROMETHAMINE 30 MG/ML
30 INJECTION, SOLUTION INTRAMUSCULAR; INTRAVENOUS EVERY 6 HOURS
Status: DISCONTINUED | OUTPATIENT
Start: 2018-08-13 | End: 2018-08-14 | Stop reason: SDUPTHER

## 2018-08-13 RX ORDER — ALBUMIN HUMAN 50 G/1000ML
SOLUTION INTRAVENOUS AS NEEDED
Status: DISCONTINUED | OUTPATIENT
Start: 2018-08-13 | End: 2018-08-13 | Stop reason: HOSPADM

## 2018-08-13 RX ORDER — NEOSTIGMINE METHYLSULFATE 1 MG/ML
INJECTION INTRAVENOUS AS NEEDED
Status: DISCONTINUED | OUTPATIENT
Start: 2018-08-13 | End: 2018-08-13 | Stop reason: HOSPADM

## 2018-08-13 RX ORDER — OXYCODONE AND ACETAMINOPHEN 5; 325 MG/1; MG/1
1 TABLET ORAL
Status: DISCONTINUED | OUTPATIENT
Start: 2018-08-13 | End: 2018-08-15 | Stop reason: HOSPADM

## 2018-08-13 RX ORDER — ONDANSETRON 2 MG/ML
4 INJECTION INTRAMUSCULAR; INTRAVENOUS
Status: DISCONTINUED | OUTPATIENT
Start: 2018-08-13 | End: 2018-08-15 | Stop reason: HOSPADM

## 2018-08-13 RX ORDER — KETAMINE HYDROCHLORIDE 10 MG/ML
INJECTION, SOLUTION INTRAMUSCULAR; INTRAVENOUS AS NEEDED
Status: DISCONTINUED | OUTPATIENT
Start: 2018-08-13 | End: 2018-08-13 | Stop reason: HOSPADM

## 2018-08-13 RX ORDER — SODIUM CHLORIDE 9 MG/ML
125 INJECTION, SOLUTION INTRAVENOUS CONTINUOUS
Status: DISCONTINUED | OUTPATIENT
Start: 2018-08-13 | End: 2018-08-15 | Stop reason: HOSPADM

## 2018-08-13 RX ORDER — SODIUM CHLORIDE 0.9 % (FLUSH) 0.9 %
5-10 SYRINGE (ML) INJECTION AS NEEDED
Status: DISCONTINUED | OUTPATIENT
Start: 2018-08-13 | End: 2018-08-15 | Stop reason: HOSPADM

## 2018-08-13 RX ORDER — SODIUM CHLORIDE 0.9 % (FLUSH) 0.9 %
5-10 SYRINGE (ML) INJECTION EVERY 8 HOURS
Status: DISCONTINUED | OUTPATIENT
Start: 2018-08-13 | End: 2018-08-15 | Stop reason: HOSPADM

## 2018-08-13 RX ORDER — DIPHENHYDRAMINE HYDROCHLORIDE 50 MG/ML
12.5 INJECTION, SOLUTION INTRAMUSCULAR; INTRAVENOUS
Status: DISCONTINUED | OUTPATIENT
Start: 2018-08-13 | End: 2018-08-15 | Stop reason: HOSPADM

## 2018-08-13 RX ORDER — PROPOFOL 10 MG/ML
INJECTION, EMULSION INTRAVENOUS AS NEEDED
Status: DISCONTINUED | OUTPATIENT
Start: 2018-08-13 | End: 2018-08-13 | Stop reason: HOSPADM

## 2018-08-13 RX ORDER — ONDANSETRON 2 MG/ML
4 INJECTION INTRAMUSCULAR; INTRAVENOUS AS NEEDED
Status: DISCONTINUED | OUTPATIENT
Start: 2018-08-13 | End: 2018-08-13 | Stop reason: HOSPADM

## 2018-08-13 RX ORDER — LOSARTAN POTASSIUM 50 MG/1
50 TABLET ORAL DAILY
Status: DISCONTINUED | OUTPATIENT
Start: 2018-08-13 | End: 2018-08-15 | Stop reason: HOSPADM

## 2018-08-13 RX ORDER — MORPHINE SULFATE 2 MG/ML
2 INJECTION, SOLUTION INTRAMUSCULAR; INTRAVENOUS
Status: DISCONTINUED | OUTPATIENT
Start: 2018-08-13 | End: 2018-08-15 | Stop reason: HOSPADM

## 2018-08-13 RX ORDER — ZOLPIDEM TARTRATE 5 MG/1
5 TABLET ORAL
Status: DISCONTINUED | OUTPATIENT
Start: 2018-08-13 | End: 2018-08-15 | Stop reason: HOSPADM

## 2018-08-13 RX ORDER — ONDANSETRON 2 MG/ML
INJECTION INTRAMUSCULAR; INTRAVENOUS AS NEEDED
Status: DISCONTINUED | OUTPATIENT
Start: 2018-08-13 | End: 2018-08-13 | Stop reason: HOSPADM

## 2018-08-13 RX ORDER — FENTANYL CITRATE 50 UG/ML
INJECTION, SOLUTION INTRAMUSCULAR; INTRAVENOUS AS NEEDED
Status: DISCONTINUED | OUTPATIENT
Start: 2018-08-13 | End: 2018-08-13 | Stop reason: HOSPADM

## 2018-08-13 RX ORDER — DEXAMETHASONE SODIUM PHOSPHATE 4 MG/ML
INJECTION, SOLUTION INTRA-ARTICULAR; INTRALESIONAL; INTRAMUSCULAR; INTRAVENOUS; SOFT TISSUE AS NEEDED
Status: DISCONTINUED | OUTPATIENT
Start: 2018-08-13 | End: 2018-08-13 | Stop reason: HOSPADM

## 2018-08-13 RX ORDER — FENTANYL CITRATE 50 UG/ML
50 INJECTION, SOLUTION INTRAMUSCULAR; INTRAVENOUS AS NEEDED
Status: DISCONTINUED | OUTPATIENT
Start: 2018-08-13 | End: 2018-08-13 | Stop reason: HOSPADM

## 2018-08-13 RX ORDER — FENTANYL CITRATE 50 UG/ML
25 INJECTION, SOLUTION INTRAMUSCULAR; INTRAVENOUS
Status: COMPLETED | OUTPATIENT
Start: 2018-08-13 | End: 2018-08-13

## 2018-08-13 RX ADMIN — SCOLOPAMINE TRANSDERMAL SYSTEM 1 PATCH: 1 PATCH, EXTENDED RELEASE TRANSDERMAL at 07:22

## 2018-08-13 RX ADMIN — CEFAZOLIN 1 G: 1 INJECTION, POWDER, FOR SOLUTION INTRAMUSCULAR; INTRAVENOUS at 21:46

## 2018-08-13 RX ADMIN — ALBUMIN HUMAN 250 ML: 50 SOLUTION INTRAVENOUS at 08:33

## 2018-08-13 RX ADMIN — DEXMEDETOMIDINE HYDROCHLORIDE 4 MCG: 4 INJECTION, SOLUTION INTRAVENOUS at 07:56

## 2018-08-13 RX ADMIN — ONDANSETRON HYDROCHLORIDE 4 MG: 2 INJECTION, SOLUTION INTRAMUSCULAR; INTRAVENOUS at 09:38

## 2018-08-13 RX ADMIN — Medication 40 MCG: at 08:16

## 2018-08-13 RX ADMIN — CEFOTETAN DISODIUM 2 G: 2 INJECTION, POWDER, FOR SOLUTION INTRAMUSCULAR; INTRAVENOUS at 07:39

## 2018-08-13 RX ADMIN — OXYCODONE HYDROCHLORIDE AND ACETAMINOPHEN 1 TABLET: 5; 325 TABLET ORAL at 20:08

## 2018-08-13 RX ADMIN — MORPHINE SULFATE 2 MG: 10 INJECTION INTRAVENOUS at 10:05

## 2018-08-13 RX ADMIN — LORAZEPAM 1 MG: 2 INJECTION INTRAMUSCULAR; INTRAVENOUS at 20:55

## 2018-08-13 RX ADMIN — Medication 40 MCG: at 07:53

## 2018-08-13 RX ADMIN — FENTANYL CITRATE 25 MCG: 50 INJECTION, SOLUTION INTRAMUSCULAR; INTRAVENOUS at 09:40

## 2018-08-13 RX ADMIN — Medication 10 ML: at 15:03

## 2018-08-13 RX ADMIN — ENOXAPARIN SODIUM 40 MG: 100 INJECTION SUBCUTANEOUS at 12:10

## 2018-08-13 RX ADMIN — EPHEDRINE SULFATE 5 MG: 50 INJECTION, SOLUTION INTRAVENOUS at 07:47

## 2018-08-13 RX ADMIN — KETOROLAC TROMETHAMINE 30 MG: 30 INJECTION, SOLUTION INTRAMUSCULAR at 15:02

## 2018-08-13 RX ADMIN — NEOSTIGMINE METHYLSULFATE 2.5 MG: 1 INJECTION INTRAVENOUS at 09:09

## 2018-08-13 RX ADMIN — Medication 40 MCG: at 08:14

## 2018-08-13 RX ADMIN — FENTANYL CITRATE 25 MCG: 50 INJECTION, SOLUTION INTRAMUSCULAR; INTRAVENOUS at 07:58

## 2018-08-13 RX ADMIN — ROCURONIUM BROMIDE 5 MG: 10 INJECTION, SOLUTION INTRAVENOUS at 07:33

## 2018-08-13 RX ADMIN — HYDROMORPHONE HYDROCHLORIDE 0.5 MG: 2 INJECTION, SOLUTION INTRAMUSCULAR; INTRAVENOUS; SUBCUTANEOUS at 07:39

## 2018-08-13 RX ADMIN — Medication 40 MCG: at 07:38

## 2018-08-13 RX ADMIN — SODIUM CHLORIDE 125 ML/HR: 900 INJECTION, SOLUTION INTRAVENOUS at 12:19

## 2018-08-13 RX ADMIN — SUCCINYLCHOLINE CHLORIDE 120 MG: 20 INJECTION INTRAMUSCULAR; INTRAVENOUS at 07:33

## 2018-08-13 RX ADMIN — FENTANYL CITRATE 25 MCG: 50 INJECTION, SOLUTION INTRAMUSCULAR; INTRAVENOUS at 09:45

## 2018-08-13 RX ADMIN — MORPHINE SULFATE 2 MG: 10 INJECTION INTRAVENOUS at 10:20

## 2018-08-13 RX ADMIN — MORPHINE SULFATE 2 MG: 10 INJECTION INTRAVENOUS at 10:10

## 2018-08-13 RX ADMIN — MORPHINE SULFATE 2 MG: 10 INJECTION INTRAVENOUS at 10:00

## 2018-08-13 RX ADMIN — FENTANYL CITRATE 75 MCG: 50 INJECTION, SOLUTION INTRAMUSCULAR; INTRAVENOUS at 07:33

## 2018-08-13 RX ADMIN — KETAMINE HYDROCHLORIDE 25 MG: 10 INJECTION, SOLUTION INTRAMUSCULAR; INTRAVENOUS at 07:59

## 2018-08-13 RX ADMIN — Medication 40 MCG: at 07:58

## 2018-08-13 RX ADMIN — FENTANYL CITRATE 25 MCG: 50 INJECTION, SOLUTION INTRAMUSCULAR; INTRAVENOUS at 09:50

## 2018-08-13 RX ADMIN — GLYCOPYRROLATE 0.4 MG: 0.2 INJECTION INTRAMUSCULAR; INTRAVENOUS at 09:09

## 2018-08-13 RX ADMIN — ROCURONIUM BROMIDE 25 MG: 10 INJECTION, SOLUTION INTRAVENOUS at 07:42

## 2018-08-13 RX ADMIN — ROCURONIUM BROMIDE 10 MG: 10 INJECTION, SOLUTION INTRAVENOUS at 08:23

## 2018-08-13 RX ADMIN — Medication 40 MCG: at 08:02

## 2018-08-13 RX ADMIN — SODIUM CHLORIDE, SODIUM LACTATE, POTASSIUM CHLORIDE, AND CALCIUM CHLORIDE 75 ML/HR: 600; 310; 30; 20 INJECTION, SOLUTION INTRAVENOUS at 06:41

## 2018-08-13 RX ADMIN — MIDAZOLAM 0.5 MG: 1 INJECTION INTRAMUSCULAR; INTRAVENOUS at 10:10

## 2018-08-13 RX ADMIN — Medication 40 MCG: at 07:50

## 2018-08-13 RX ADMIN — DEXMEDETOMIDINE HYDROCHLORIDE 4 MCG: 4 INJECTION, SOLUTION INTRAVENOUS at 07:58

## 2018-08-13 RX ADMIN — ONDANSETRON 4 MG: 2 INJECTION INTRAMUSCULAR; INTRAVENOUS at 07:32

## 2018-08-13 RX ADMIN — ONDANSETRON 4 MG: 2 INJECTION, SOLUTION INTRAMUSCULAR; INTRAVENOUS at 20:08

## 2018-08-13 RX ADMIN — Medication 40 MCG: at 07:55

## 2018-08-13 RX ADMIN — LIDOCAINE HYDROCHLORIDE 50 MG: 20 INJECTION, SOLUTION EPIDURAL; INFILTRATION; INTRACAUDAL; PERINEURAL at 07:33

## 2018-08-13 RX ADMIN — KETOROLAC TROMETHAMINE 30 MG: 30 INJECTION, SOLUTION INTRAMUSCULAR at 20:55

## 2018-08-13 RX ADMIN — FENTANYL CITRATE 25 MCG: 50 INJECTION, SOLUTION INTRAMUSCULAR; INTRAVENOUS at 09:55

## 2018-08-13 RX ADMIN — LOSARTAN POTASSIUM 50 MG: 50 TABLET ORAL at 12:11

## 2018-08-13 RX ADMIN — Medication 40 MCG: at 08:12

## 2018-08-13 RX ADMIN — ACETAMINOPHEN 800 MG: 10 INJECTION, SOLUTION INTRAVENOUS at 08:09

## 2018-08-13 RX ADMIN — KETOROLAC TROMETHAMINE 30 MG: 30 INJECTION, SOLUTION INTRAMUSCULAR; INTRAVENOUS at 09:22

## 2018-08-13 RX ADMIN — SODIUM CHLORIDE 125 ML/HR: 900 INJECTION, SOLUTION INTRAVENOUS at 21:27

## 2018-08-13 RX ADMIN — DEXAMETHASONE SODIUM PHOSPHATE 8 MG: 4 INJECTION, SOLUTION INTRA-ARTICULAR; INTRALESIONAL; INTRAMUSCULAR; INTRAVENOUS; SOFT TISSUE at 07:40

## 2018-08-13 RX ADMIN — CEFAZOLIN 1 G: 1 INJECTION, POWDER, FOR SOLUTION INTRAMUSCULAR; INTRAVENOUS at 15:02

## 2018-08-13 RX ADMIN — MIDAZOLAM HYDROCHLORIDE 2 MG: 1 INJECTION, SOLUTION INTRAMUSCULAR; INTRAVENOUS at 07:22

## 2018-08-13 RX ADMIN — MORPHINE SULFATE 2 MG: 10 INJECTION INTRAVENOUS at 10:15

## 2018-08-13 RX ADMIN — EPHEDRINE SULFATE 5 MG: 50 INJECTION, SOLUTION INTRAVENOUS at 08:11

## 2018-08-13 RX ADMIN — DEXMEDETOMIDINE HYDROCHLORIDE 4 MCG: 4 INJECTION, SOLUTION INTRAVENOUS at 08:02

## 2018-08-13 RX ADMIN — Medication 40 MCG: at 07:45

## 2018-08-13 RX ADMIN — PROPOFOL 150 MG: 10 INJECTION, EMULSION INTRAVENOUS at 07:33

## 2018-08-13 RX ADMIN — ONDANSETRON 4 MG: 2 INJECTION, SOLUTION INTRAMUSCULAR; INTRAVENOUS at 15:12

## 2018-08-13 RX ADMIN — MORPHINE SULFATE 2 MG: 2 INJECTION, SOLUTION INTRAMUSCULAR; INTRAVENOUS at 12:12

## 2018-08-13 RX ADMIN — EPHEDRINE SULFATE 5 MG: 50 INJECTION, SOLUTION INTRAVENOUS at 07:57

## 2018-08-13 NOTE — BRIEF OP NOTE
BRIEF OPERATIVE NOTE    Date of Procedure: 8/13/2018   Preoperative Diagnosis: PELVIC MASS  Postoperative Diagnosis: PELVIC MASS    Procedure(s):  EXPLORATORY LAPAROTOMY, RESECTION OF MASS, TOTAL ABDOMINAL HYSTERECTOMY, BILATERAL SALPINGOOOPHORECTOMY  Surgeon(s) and Role:     * Irene Alvarado MD - Primary         Surgical Assistant: Darian Connor PA-C    Surgical Staff:  Circ-1: Nuzhat Maharaj RN  Circ-Intern: Walter Tenorio RN  Physician Assistant: ISMA Bah  Scrub Tech-2: Russel Madrid  Scrub RN-1: Krishan Harris RN  Event Time In   Incision Start 0800   Incision Close      Anesthesia: General   Estimated Blood Loss: 50 cc  Specimens:   ID Type Source Tests Collected by Time Destination   1 : Left Adnexal Mass Frozen Section Tissue  Irene Alvarado MD 8/13/2018 2913 Pathology   2 : Uterus, Cervix, Right Fallopian Tube, Ovary Fresh Uterus with Fallopian Tube & Ovary  Irene Alvarado MD 8/13/2018 3430 Pathology   1 : Pelvic Washings Fresh Other                  Irene Alvarado MD 8/13/2018 5252 Cytology      Findings: Large cystic mass arising from the left ovary. Benign serous cystadenoma on frozen section pathology.    Complications: None  Implants: * No implants in log *    Irene Alvarado MD

## 2018-08-13 NOTE — OP NOTES
Gynecologic Oncology Operative Report    Fe Resendez  8/13/2018    Pre-operative dx:  Pelvic mass    Post-operative dx:  Pelvic mass    Procedure:  Exploratory laparotomy, resection of mass >10 cm, total abdominal hysterectomy, bilateral salpingooophorectomy    Surgeon:  Chele Almaraz MD    Assistant:  Eugene Muro PA-C     Anesthesia:  GETA    EBL:  50 cc    Complications:  None    Implants:  None    Specimens:  Left adnexal mass, uterus, cervix, bilateral fallopian tubes and ovaries, pelvic washings    Operative indications:  60 yo WF referred to me for a large cystic pelvic mass. Tumor markers were normal.  I reassured her that it was most likely benign, but due to its size I recommended surgical resection. Operative findings:  Large cystic mass arising from the left ovary. Benign serous cystadenoma on frozen section pathology. Procedure in detail:    After the risks, benefits, indications, and alternatives of the procedure were discussed with the patient and informed consent was obtained, the patient was taken to the operating room where she was identified as the correct patient. She was then administered general anesthesia and placed in the supine position. She was then prepped and draped in the usual fashion. A Brothers catheter was inserted. The abdomen was entered via vertical midline skin incision extending from the pubic symphysis to just below the umbilicus. Upon entering the abdominal cavity, the above-mentioned findings were noted. The mass was delivered out of the abdomen. It was arising from the left adnexa. The left round ligament was identified. It was coagulated and transected with electrocautery, allowing entry into the retroperitoneal space, where the left ureter was identified. The left ovarian vessels were then isolated. A window was created in the posterior leaf of the broad ligament between the ureter and the left ovarian vessels.   The ovarian vessels were then doubly clamped with curved Zeppelin clamps, transected in between, and then ligated on either side with Vicryl sutures. A curved Danii clamp was then placed on the left uterine cornua and the mass was excised and sent for pathology. A Bookwalter retractor was placed, and pelvic washings were obtained. The bowel was packed into the upper abdomen with moist lap sponges. We then moved to the right side of the patient. A curved Danii clam was placed on the right uterine cornua. We then  identified the right round ligament. It was coagulated and transected with electrocautery, allowing entry into the retroperitoneal space, where the right ureter was identified. A window was then created in the posterior leaf of the broad ligament between the right ovarian vessels and the right ureter. The right ovarian vessels were then doubly clamped with curved Zeppelin clamps, transected in between, and then ligated on either side with Vicryl suture. We then moved anteriorly and began sharply dissecting the bladder off the lower uterine segment and cervix. We then skeletonized the uterine arteries bilaterally along the lateral aspect of the cervix. Curved Zeppelin clamps were placed at the level of the internal cervical os on each side. The pedicles were transected and then ligated with Vicryl suture. We then moved down the cervix, taking straight Zeppelin clamp bites on each side of the cervix, staying medial to the last bite. Each of these pedicles was transected and ligated with Vicryl suture. At this point, we were able to come across the upper vagina just below the cervix with angled Zeppelin clamps coming from each side and meeting in the midline. The specimen was then excised with Kb scissors and sent for pathology. The vaginal cuff was then closed with a 0 Vicryl suture in a running locking fashion, utilizing the [place] technique. Excellent reapproximation of the cuff was noted.   At this point, we irrigated the pelvis and made sure there was no evidence of bleeding. We then removed all lap sponges and retractors, and the abdominal wall was then closed with a number 1 looped PDS suture in a running, nonlocking fashion. Subcutaneous tissues were made hemostatic with electrocautery, and the subcutaneous fat was approximated with a running 3-0 Vicryl suture. The skin was then closed with with a 4-0 Monocryl in a running subcuticular fashion. Dermabond was then applied to the incision. The patient was then awakened from anesthesia and taken to the recovery room in stable condition. All sponge, lap, and needle counts were correct.       Loco Keita MD  8/13/2018  8:56 AM

## 2018-08-13 NOTE — ANESTHESIA POSTPROCEDURE EVALUATION
Post-Anesthesia Evaluation and Assessment    Patient: Denae Robles MRN: [id number]  SSN: [id number]    YOB: 1960  Age: 59 y.o. Sex: female       Cardiovascular Function/Vital Signs  Visit Vitals    /68 (BP 1 Location: Left arm, BP Patient Position: At rest)    Pulse 65    Temp 36.5 °C (97.7 °F)    Resp 14    Ht 5' 6\" (1.676 m)    Wt 55.3 kg (122 lb)    SpO2 98%    BMI 19.69 kg/m2       Patient is status post general anesthesia for Procedure(s):  EXPLORATORY LAPAROTOMY, RESECTION OF MASS, TOTAL ABDOMINAL HYSTERECTOMY, BILATERAL SALPINGO OOPHORECTOMY. Nausea/Vomiting: None    Postoperative hydration reviewed and adequate. Pain:  Pain Scale 1: Numeric (0 - 10) (08/13/18 1231)  Pain Intensity 1: 7 (08/13/18 1231)   Managed    Neurological Status:   Neuro (WDL): Exceptions to WDL (08/13/18 1015)  Neuro  Neurologic State: Alert (08/13/18 1050)  LUE Motor Response: Purposeful (08/13/18 1015)  LLE Motor Response: Purposeful (08/13/18 1015)  RUE Motor Response: Purposeful (08/13/18 1015)  RLE Motor Response: Purposeful (08/13/18 1015)   At baseline    Mental Status and Level of Consciousness: Alert and oriented     Pulmonary Status:   O2 Device: Room air (08/13/18 1050)   Adequate oxygenation and airway patent    Complications related to anesthesia: None    Post-anesthesia assessment completed.  No concerns    Signed By: Zohra Celestin DO     August 13, 2018

## 2018-08-13 NOTE — H&P
[address] [place] [address] Mathias Moritz [address], [address] [place] [address] Sara  P [phone]  F [phone]      Patient ID:  Name:  Niyah Jaramillo  MRN:  [id number]  :  1960/57 y.o. Date:  2018      HISTORY OF PRESENT ILLNESS:  Niyah Jaramillo is a 59 y.o.  postmenopausal female who is being seen for a large cystic pelvic mass. She is referred by Dr. Seb Brown. She had presented to her PCP, Dr. Latonya Rea, complaining of lower abdominal fullness. He sent her for an ultrasound which demonstrated a large cystic mass. She then was sent for a CT of the abdomen/pelvis to better evaluate. She was referred to Dr. Crystal Monterroso, but after reviewing her CT, he felt that this was most likely gynecologic in origin and asked that I see her in consultation.          ROS:   and GI review:  Negative  Cardiopulmonary review:  Negative   Musculoskeletal:  Negative     A comprehensive review of systems was negative except for that written in the History of Present Illness. , 10 point ROS        OB/GYN ROS:    There is no history of significant gyn problems or procedures. Patient denies any abnormal bleeding or vaginal discharge.        Problem List:  Patient Active Problem List    Diagnosis Date Noted    Pelvic mass 2018    Hyperlipidemia 2016    Essential hypertension 2016    Osteoporosis, post-menopausal 10/01/2015    GERD (gastroesophageal reflux disease) 10/26/2010    AR (allergic rhinitis) 10/26/2010     PMH:  Past Medical History:   Diagnosis Date    Abnormal Pap smear     h/o    Allergic rhinitis due to other allergen     Arrhythmia     PAC'S - CHAMOMILLE TEA CAUSES    Esophageal reflux     Herpes zoster     Hypertension     Mixed hyperlipidemia     Nausea & vomiting     Osteopenia     Other atopic dermatitis and related conditions     Shingles rash 10/2015    small spot on left shoulder    Unspecified spontaneous  without mention of complication     X2      PSH:  Past Surgical History:   Procedure Laterality Date    COLPOSCOPY  31 y/o    with cryo    DILATION AND CURETTAGE      HX COLONOSCOPY  2001    HX WRIST FRACTURE TX Right     WRIST REPAIR      Social History:  Social History   Substance Use Topics    Smoking status: Never Smoker    Smokeless tobacco: Never Used    Alcohol use No      Family History:  Family History   Problem Relation Age of Onset    Hypertension Mother     Heart Disease Mother      CHF    Cancer Mother      kidney    Heart Attack Father     Coronary Artery Disease Father      with pacemaker    Asthma Father     Heart Disease Father      pacemaker    Stroke Maternal Grandmother     Heart Attack Paternal Grandfather     Anesth Problems Neg Hx       Medications: (reviewed)  Current Facility-Administered Medications   Medication Dose Route Frequency    cefoTEtan (CEFOTAN) 2 g in 0.9% sodium chloride (MBP/ADV) 50 mL  2 g IntraVENous ONCE    lactated Ringers infusion  75 mL/hr IntraVENous CONTINUOUS    0.9% sodium chloride infusion  50 mL/hr IntraVENous CONTINUOUS    sodium chloride (NS) flush 5-10 mL  5-10 mL IntraVENous Q8H    sodium chloride (NS) flush 5-10 mL  5-10 mL IntraVENous PRN    lidocaine (PF) (XYLOCAINE) 10 mg/mL (1 %) injection 0.1 mL  0.1 mL SubCUTAneous PRN    fentaNYL citrate (PF) injection 50 mcg  50 mcg IntraVENous PRN    midazolam (VERSED) injection 1 mg  1 mg IntraVENous PRN    midazolam (VERSED) injection 1 mg  1 mg IntraVENous PRN     Allergies: (reviewed)  No Known Allergies       OBJECTIVE:    Physical Exam:  VITAL SIGNS: Vitals:    08/13/18 0624   BP: 147/85   Pulse: 64   Resp: 17   Temp: 98.2 °F (36.8 °C)   SpO2: 100%   Weight: 122 lb (55.3 kg)   Height: 5' 6\" (1.676 m)     Body mass index is 19.69 kg/(m^2). GENERAL DAVE: Conversant, alert, oriented. No acute distress. HEENT: HEENT. No thyroid enlargement. No JVD. Neck: Supple without restrictions.    RESPIRATORY: Clear to auscultation and percussion to the bases. No CVAT. CARDIOVASC: RRR without murmur/rub. GASTROINT: Soft, non-tender. Mass filling lower abdomen. MUSCULOSKEL: no joint tenderness, deformity or swelling   EXTREMITIES: extremities normal, atraumatic, no cyanosis or edema   PELVIC: Normal external genitalia. Normal vagina. Normal cervix. Uterus displaced anteriorly and to the right. Smooth mass filling the cul de sac. The mass was somewhat mobile. No nodularity. RECTAL: Deferred   SOUMYA SURVEY: No suspicious lymphadenopathy or edema noted. NEURO: Grossly intact. No acute deficit. Lab Results   Component Value Date/Time    WBC 5.2 08/07/2018 02:30 PM    HGB 13.0 08/07/2018 02:30 PM    HCT 40.2 08/07/2018 02:30 PM    PLATELET 471 54/78/5881 02:30 PM    MCV 93.7 08/07/2018 02:30 PM     Lab Results   Component Value Date/Time    Sodium 140 08/07/2018 02:30 PM    Potassium 4.0 08/07/2018 02:30 PM    Chloride 102 08/07/2018 02:30 PM    CO2 30 08/07/2018 02:30 PM    Anion gap 8 08/07/2018 02:30 PM    Glucose 79 08/07/2018 02:30 PM    BUN 16 08/07/2018 02:30 PM    Creatinine 0.83 08/07/2018 02:30 PM    BUN/Creatinine ratio 19 08/07/2018 02:30 PM    GFR est AA >60 08/07/2018 02:30 PM    GFR est non-AA >60 08/07/2018 02:30 PM    Calcium 9.0 08/07/2018 02:30 PM     Lab Results   Component Value Date/Time    CA-125 19 08/07/2018 02:30 PM     Lab Results   Component Value Date/Time    CEA 2.8 08/07/2018 02:30 PM         Pelvic ultrasound (7/21/18)  In the area of clinical concern is a very large cystic structure deep  to the anterior abdominal wall measuring 18.9 x 12.8 x 10.6 cm in size with an  estimated volume of 13 31 mL which is seen separate from the bladder wall. Its  origin is uncertain. Diagnostic possibilities include ovary, mesentery, and  bowel. No Doppler flow within the cystic lesion is shown.  Flow adjacent to the  cystic structure is shown circumferentially which is of uncertain origin,  possibly within adjacent vessels as opposed to flow within the wall of the  lesion.      IMPRESSION: Large cystic mass measuring up to nearly 20 cm in size. Further  evaluation with contrast enhanced CT of the abdomen and pelvis is recommended. Depending upon CT imaging results, further evaluation with MRI may be required.        CT of abdomen/pelvis (7/30/18)  LUNG BASES: Clear. INCIDENTALLY IMAGED HEART AND MEDIASTINUM: Unremarkable. LIVER: There is a well demarcated hypodensity in the dome measuring 2.6 x 2.2  cm. There is a larger well demarcated hypodensity in the right lobe measuring  roughly 4.4 x 4.2 cm possibly representing a hemangioma. GALLBLADDER: No gallstones. Possible 4 mm polyp. SPLEEN: No mass. PANCREAS: No mass or ductal dilatation. ADRENALS: Unremarkable. KIDNEYS: No mass, calculus, or hydronephrosis. STOMACH: Unremarkable. SMALL BOWEL: No dilatation or wall thickening. COLON: No dilatation or wall thickening. APPENDIX: Unremarkable. PERITONEUM: Images through the pelvis reveal a massive cystic appearing  structure measuring roughly 15.3 x 12.2 cm. RETROPERITONEUM: No lymphadenopathy or aortic aneurysm. REPRODUCTIVE ORGANS: Large cystic mass. URINARY BLADDER: No mass or calculus. BONES: No destructive bone lesion. ADDITIONAL COMMENTS: N/A      IMPRESSION:  Multiple hypodense lesions within the liver likely hemangiomas. Three-phase  liver study would be helpful to confirm if indicated clinically.      Possible tiny gallbladder polyp.      Massive cystic appearing structure with epicenter in the pelvis interposed  between the uterus and rectosigmoid region. Differential considerations would  include large adnexal cyst, cystic neoplasm although no definite soft tissue  component, congenital cystic abnormality, etc.        IMPRESSION/PLAN:  Heather Patino is a 59 y.o. female with a working diagnosis of cystic pelvic mass.   I reviewed with Heather Patino her medical records, physical exam, and review of symptoms. I discussed with her and her  the differential diagnosis, including both benign and malignant conditions. I reviewed the CT images with them and explained the findings. The mass appears to be coming from the left adnexa. I can see a normal right ovary and the mass pushes the uterus upward and to the right. I feel that the mass is most likely benign, since it is unilocular and there are no solid components or septations. I recommended surgical excision with frozen section pathology. Based upon the size of the mass, ~20 x 15 x 12 cm, she is not a candidate for laparoscopic removal.  I have recommended laparotomy. She was counseled on the risks, benefits, indications, and alternatives of surgery. Her questions were answered and she wishes to proceed.       Signed By: Crystal Fermin MD     8/13/2018/7:13 AM

## 2018-08-13 NOTE — IP AVS SNAPSHOT
[address] [facility] [address] 
[phone] Patient: Mauricio Holland MRN: [id number] :1960 A check haroon indicates which time of day the medication should be taken. My Medications START taking these medications Instructions Each Dose to Equal  
 Morning Noon Evening Bedtime HYDROcodone-acetaminophen 5-325 mg per tablet Commonly known as:  Martha Fothergill Your last dose was: Your next dose is: Take 1 Tab by mouth every four (4) hours as needed for Pain. Max Daily Amount: 6 Tabs. 1 Tab  
    
   
   
   
  
 senna-docusate 8.6-50 mg per tablet Commonly known as:  Trupti Calk Your last dose was: Your next dose is: Take 1-2 Tabs by mouth daily as needed for Constipation. 1-2 Tab CONTINUE taking these medications Instructions Each Dose to Equal  
 Morning Noon Evening Bedtime Biotin 2,500 mcg Cap Your last dose was: Your next dose is: Take  by mouth. CALCIUM + D PO Your last dose was: Your next dose is: Take 1 Tab by mouth daily. Total 770 mg daily calcium and 1000 units vitamin d  
 1 Tab  
    
   
   
   
  
 cholecalciferol (vitamin D3) 2,000 unit Tab Your last dose was: Your next dose is: Take 2,000 Units by mouth daily. 2000 Units  
    
   
   
   
  
 ibuprofen 100 mg tablet Your last dose was: Your next dose is: Take 100 mg by mouth every six (6) hours as needed for Pain. 100 mg  
    
   
   
   
  
 losartan 50 mg tablet Commonly known as:  COZAAR Your last dose was: Your next dose is: TAKE 1 TAB BY MOUTH DAILY. MULTIVITAMIN PO Your last dose was: Your next dose is: Take 1 Tab by mouth daily. 1 Tab PROBIOTIC 4X 10-15 mg Tbec Generic drug:  B.infantis-B.ani-B.long-B.bifi Your last dose was: Your next dose is: Take 1 Tab by mouth. 1 Tab ZyrTEC 10 mg Cap Generic drug:  Cetirizine Your last dose was: Your next dose is: Take 10 mg by mouth as needed. 10 mg Where to Get Your Medications Information on where to get these meds will be given to you by the nurse or doctor. ! Ask your nurse or doctor about these medications HYDROcodone-acetaminophen 5-325 mg per tablet  
 senna-docusate 8.6-50 mg per tablet

## 2018-08-13 NOTE — ANESTHESIA PREPROCEDURE EVALUATION
Anesthetic History   No history of anesthetic complications            Review of Systems / Medical History  Patient summary reviewed, nursing notes reviewed and pertinent labs reviewed    Pulmonary  Within defined limits                 Neuro/Psych   Within defined limits           Cardiovascular              Hyperlipidemia    Exercise tolerance: >4 METS  Comments: PAC'S - CHAMOMILLE TEA CAUSES   GI/Hepatic/Renal     GERD           Endo/Other  Within defined limits           Other Findings   Comments: Pelvic mass           Physical Exam    Airway  Mallampati: III  TM Distance: 4 - 6 cm  Neck ROM: normal range of motion   Mouth opening: Normal     Cardiovascular  Regular rate and rhythm,  S1 and S2 normal,  no murmur, click, rub, or gallop  Rhythm: regular  Rate: normal         Dental  No notable dental hx       Pulmonary  Breath sounds clear to auscultation               Abdominal  GI exam deferred       Other Findings            Anesthetic Plan    ASA: 2  Anesthesia type: general          Induction: Intravenous  Anesthetic plan and risks discussed with: Patient

## 2018-08-13 NOTE — PROGRESS NOTES
TRANSFER - IN REPORT:    Verbal report received from [facility]) on Denae Robles  being received from PACU(unit) for routine post - op      Report consisted of patients Situation, Background, Assessment and   Recommendations(SBAR). Information from the following report(s) SBAR, Kardex, OR Summary, Procedure Summary, Intake/Output and MAR was reviewed with the receiving nurse. Opportunity for questions and clarification was provided. Assessment completed upon patients arrival to unit and care assumed. 1240- patient arrived on unit, admission assessment complete, patient stable, tearful and anxious but comfortable. 1300- Patient had some pain  1430- NP at bedside, orders for clear liquids given, encourage to walk, and pain management. 1500- Patient stood up to walk, felt dizzy and nauseous, Zofran given.

## 2018-08-13 NOTE — ROUTINE PROCESS
Patient: Daniel Rizzo MRN: [id number]  SSN: [id number]   YOB: 1960  Age: 59 y.o. Sex: female     Patient is status post Procedure(s):  EXPLORATORY LAPAROTOMY, RESECTION OF MASS, TOTAL ABDOMINAL HYSTERECTOMY, BILATERAL SALPINGO OOPHORECTOMY. Surgeon(s) and Role:     * Ceferino Gregorio MD - Primary    Local/Dose/Irrigation: .9% NACL used PRN per surgeon.                      Peripheral IV 08/13/18 Right Wrist (Active)            Airway - Endotracheal Tube 08/13/18 Oral (Active)                   Dressing/Packing:  Wound Abdomen-DRESSING TYPE: Topical skin adhesive/glue (08/13/18 0700)

## 2018-08-13 NOTE — PERIOP NOTES
TRANSFER - OUT REPORT:    Verbal report given to  JOEL Vargas(name) on Kizzy Tomlinson  being transferred to 305(unit) for routine post - op       Report consisted of patients Situation, Background, Assessment and   Recommendations(SBAR). Time Pre op antibiotic given: 7:39 amCefotetan  Anesthesia Stop time: 9:33am  Brothers Present on Transfer to floor:yes  Order for Brothers on Chart:yes  Discharge Prescriptions with Chart:no    Information from the following report(s) SBAR, Kardex, OR Summary, Procedure Summary, Intake/Output and MAR was reviewed with the receiving nurse. Opportunity for questions and clarification was provided. Is the patient on 02? NO       L/Min        Other     Is the patient on a monitor? NO    Is the nurse transporting with the patient? NO    Surgical Waiting Area notified of patient's transfer from PACU?  YES      The following personal items collected during your admission accompanied patient upon transfer:   Dental Appliance:    Vision:    Hearing Aid:    Jewelry:    Clothing: Clothing: Other (comment) (clothing bag to pacu)  Other Valuables:    Valuables sent to safe:

## 2018-08-13 NOTE — IP AVS SNAPSHOT
[address] [facility] 
[phone] Patient: Khari Kramer MRN: [id number] :1960 About your hospitalization You were admitted on:  2018 You last received care in the:  [address] [place] [address] You were discharged on:  August 15, 2018 Why you were hospitalized Your primary diagnosis was:  Not on File Your diagnoses also included:  Pelvic Mass, Essential Hypertension, Gerd (Gastroesophageal Reflux Disease), Hyperlipidemia, Osteoporosis, Post-Menopausal  
  
Follow-up Information Follow up With Details Comments Contact Info Nicole Garrett MD In 1 month Call to schedule appointment. [address] [address] [address] [place] [address] 
[phone] Discharge Orders None A check haroon indicates which time of day the medication should be taken. My Medications START taking these medications Instructions Each Dose to Equal  
 Morning Noon Evening Bedtime HYDROcodone-acetaminophen 5-325 mg per tablet Commonly known as:  Sherif Narendra Your last dose was: Your next dose is: Take 1 Tab by mouth every four (4) hours as needed for Pain. Max Daily Amount: 6 Tabs. 1 Tab  
    
   
   
   
  
 senna-docusate 8.6-50 mg per tablet Commonly known as:  Zelphia Clonts Your last dose was: Your next dose is: Take 1-2 Tabs by mouth daily as needed for Constipation. 1-2 Tab CONTINUE taking these medications Instructions Each Dose to Equal  
 Morning Noon Evening Bedtime Biotin 2,500 mcg Cap Your last dose was: Your next dose is: Take  by mouth. CALCIUM + D PO Your last dose was: Your next dose is: Take 1 Tab by mouth daily. Total 770 mg daily calcium and 1000 units vitamin d  
 1 Tab cholecalciferol (vitamin D3) 2,000 unit Tab Your last dose was: Your next dose is: Take 2,000 Units by mouth daily. 2000 Units  
    
   
   
   
  
 ibuprofen 100 mg tablet Your last dose was: Your next dose is: Take 100 mg by mouth every six (6) hours as needed for Pain. 100 mg  
    
   
   
   
  
 losartan 50 mg tablet Commonly known as:  COZAAR Your last dose was: Your next dose is: TAKE 1 TAB BY MOUTH DAILY. MULTIVITAMIN PO Your last dose was: Your next dose is: Take 1 Tab by mouth daily. 1 Tab PROBIOTIC 4X 10-15 mg Tbec Generic drug:  B.infantis-B.ani-B.long-B.bifi Your last dose was: Your next dose is: Take 1 Tab by mouth. 1 Tab ZyrTEC 10 mg Cap Generic drug:  Cetirizine Your last dose was: Your next dose is: Take 10 mg by mouth as needed. 10 mg Where to Get Your Medications Information on where to get these meds will be given to you by the nurse or doctor. ! Ask your nurse or doctor about these medications HYDROcodone-acetaminophen 5-325 mg per tablet  
 senna-docusate 8.6-50 mg per tablet Opioid Education Prescription Opioids: What You Need to Know: 
 
Prescription opioids can be used to help relieve moderate-to-severe pain and are often prescribed following a surgery or injury, or for certain health conditions. These medications can be an important part of treatment but also come with serious risks. Opioids are strong pain medicines. Examples include hydrocodone, oxycodone, fentanyl, and morphine. Heroin is an example of an illegal opioid. It is important to work with your health care provider to make sure you are getting the safest, most effective care. WHAT ARE THE RISKS AND SIDE EFFECTS OF OPIOID USE? Prescription opioids carry serious risks of addiction and overdose, especially with prolonged use. An opioid overdose, often marked by slow breathing, can cause sudden death. The use of prescription opioids can have a number of side effects as well, even when taken as directed. · Tolerance-meaning you might need to take more of a medication for the same pain relief · Physical dependence-meaning you have symptoms of withdrawal when the medication is stopped. Withdrawal symptoms can include nausea, sweating, chills, diarrhea, stomach cramps, and muscle aches. Withdrawal can last up to several weeks, depending on which drug you took and how long you took it. · Increased sensitivity to pain · Constipation · Nausea, vomiting, and dry mouth · Sleepiness and dizziness · Confusion · Depression · Low levels of testosterone that can result in lower sex drive, energy, and strength · Itching and sweating RISKS ARE GREATER WITH:      
· History of drug misuse, substance use disorder, or overdose · Mental health conditions (such as depression or anxiety) · Sleep apnea · Older age (72 years or older) · Pregnancy Avoid alcohol while taking prescription opioids. Also, unless specifically advised by your health care provider, medications to avoid include: · Benzodiazepines (such as Xanax or Valium) · Muscle relaxants (such as Soma or Flexeril) · Hypnotics (such as Ambien or Lunesta) · Other prescription opioids KNOW YOUR OPTIONS Talk to your health care provider about ways to manage your pain that don't involve prescription opioids. Some of these options may actually work better and have fewer risks and side effects. Options may include: 
· Pain relievers such as acetaminophen, ibuprofen, and naproxen · Some medications that are also used for depression or seizures · Physical therapy and exercise · Counseling to help patients learn how to cope better with triggers of pain and stress. · Application of heat or cold compress · Massage therapy · Relaxation techniques Be Informed Make sure you know the name of your medication, how much and how often to take it, and its potential risks & side effects. IF YOU ARE PRESCRIBED OPIOIDS FOR PAIN: 
· Never take opioids in greater amounts or more often than prescribed. Remember the goal is not to be pain-free but to manage your pain at a tolerable level. · Follow up with your primary care provider to: · Work together to create a plan on how to manage your pain. · Talk about ways to help manage your pain that don't involve prescription opioids. · Talk about any and all concerns and side effects. · Help prevent misuse and abuse. · Never sell or share prescription opioids · Help prevent misuse and abuse. · Store prescription opioids in a secure place and out of reach of others (this may include visitors, children, friends, and family). · Safely dispose of unused/unwanted prescription opioids: Find your community drug take-back program or your pharmacy mail-back program, or flush them down the toilet, following guidance from the Food and Drug Administration (www.fda.gov/Drugs/ResourcesForYou). · Visit www.cdc.gov/drugoverdose to learn about the risks of opioid abuse and overdose. · If you believe you may be struggling with addiction, tell your health care provider and ask for guidance or call [address] [place] [address] at [phone]. Discharge Instructions [address] [facility], [address] [facility], [facility] Darlyn PEPE [phone]  F [phone] Patient Discharge Instructions Heather Patino / [id number] : 1960 Admitted 2018 Discharged: 8/15/2018 Take Home Medications No current facility-administered medications on file prior to encounter. Current Outpatient Prescriptions on File Prior to Encounter Medication Sig Dispense Refill  Biotin 2,500 mcg cap Take  by mouth.  losartan (COZAAR) 50 mg tablet TAKE 1 TAB BY MOUTH DAILY. 30 Tab 4  cholecalciferol, vitamin D3, 2,000 unit tab Take 2,000 Units by mouth daily.  B.infantis-B.ani-B.long-B.bifi (PROBIOTIC 4X) 10-15 mg TbEC Take 1 Tab by mouth.  CALCIUM CARBONATE/VITAMIN D3 (CALCIUM + D PO) Take 1 Tab by mouth daily. Total 770 mg daily calcium and 1000 units vitamin d  MULTIVITAMIN PO Take 1 Tab by mouth daily. · It is important that you take the medication exactly as they are prescribed. · Keep your medication in the bottles provided by the pharmacist and keep a list of the medication names, dosages, and times to be taken in your wallet. · Do not take other medications without consulting your doctor. What to do at [facility] Recommended diet: Regular, low fiber until bowels back to normal, stay hydrated. You should take a stool softener such as colace for constipation. If constipation persists for >24 hours you should take a dose of Milk of Magnesia. Call if your constipation persists. Since you have had a hysterectomy or vaginal surgery you may experience vaginal spotting. This is acceptable. Should the bleeding require more that 4 pads a day please call our office. Nothing per vagina for 6-8 weeks. Recommended activity: No driving for 2 week and/or while on pain medication Walk at least 6 times per day Showers okay, no tub bathing/swimming for two weeks Activity as able, stairs okay. Siince you had an open procedure, no heavy lifting greater than 15 lbs for 6 weeks.  
 
If you experience any of the following persisting symptoms: 
 
Nausea/vomiting, fever > 101 F, diarrhea/constipation, increasing pain despite medication, increasing vaginal discharge or any concerns, please call our office. Follow-up with Dr. Bonita Lindsey in 3-4. Call [phone] for an appointment. Information obtained by : 
I understand that if any problems occur once I am at home I am to contact my physician. I understand and acknowledge receipt of the instructions indicated above. Physician's or R.N.'s Signature                                                                  Date/Time Patient or Representative Signature                                                          Date/Time EverCharge Announcement We are excited to announce that we are making your provider's discharge notes available to you in EverCharge. You will see these notes when they are completed and signed by the physician that discharged you from your recent hospital stay. If you have any questions or concerns about any information you see in EverCharge, please call the Health Information Department where you were seen or reach out to your Primary Care Provider for more information about your plan of care. Introducing [facility]! Dear Moi Sandoval: Thank you for requesting a EverCharge account. Our records indicate that you already have an active EverCharge account. You can access your account anytime at [email or web address] Did you know that you can access your hospital and ER discharge instructions at any time in EverCharge? You can also review all of your test results from your hospital stay or ER visit. Additional Information If you have questions, please visit the Frequently Asked Questions section of the QuickPay website at [email or web address] Remember, QuickPay is NOT to be used for urgent needs. For medical emergencies, dial 911. Now available from your iPhone and Android! Introducing Vishal Feldman As a [facility] patient, I wanted to make you aware of our electronic visit tool called Vishal Feldman. Vhall allows you to connect within minutes with a medical provider 24 hours a day, seven days a week via a mobile device or tablet or logging into a secure website from your computer. You can access Vishal Feldman from anywhere in the [place]. A virtual visit might be right for you when you have a simple condition and feel like you just dont want to get out of bed, or cant get away from work for an appointment, when your regular [facility] provider is not available (evenings, weekends or holidays), or when youre out of town and need minor care. Electronic visits cost only $49 and if the LindoINVOLTA provider determines a prescription is needed to treat your condition, one can be electronically transmitted to a nearby pharmacy*. Please take a moment to enroll today if you have not already done so. The enrollment process is free and takes just a few minutes. To enroll, please download the Vhall judy to your tablet or phone, or visit [email or web address] to enroll on your computer. And, as an [address] [place] [address] patient with a TuneUp account, the results of your visits will be scanned into your electronic medical record and your primary care provider will be able to view the scanned results. We urge you to continue to see your regular [facility] provider for your ongoing medical care. And while your primary care provider may not be the one available when you seek a Vishal Feldman virtual visit, the peace of mind you get from getting a real diagnosis real time can be priceless. For more information on Vishal Feldman, view our Frequently Asked Questions (FAQs) at [email or web address]. Sincerely, 
 
Valentino Milks, MD 
Chief Medical Officer [address] *:  certain medications cannot be prescribed via Vishal Feldman Providers Seen During Your Hospitalization Provider Specialty Primary office phone Yessenia Rodriguez MD Gynecologic Oncology [phone] Your Primary Care Physician (PCP) Primary Care Physician Office Phone Office Fax Darroll Bing [phone] You are allergic to the following No active allergies Recent Documentation Height Weight BMI OB Status Smoking Status 1.676 m 55.3 kg 19.69 kg/m2 Postmenopausal Never Smoker Emergency Contacts Name Discharge Info Relation Home Work Mobile [address], [address] CAREGIVER [3] Spouse [3] [phone] Patient Belongings The following personal items are in your possession at time of discharge: 
     Visual Aid: Glasses, With patient             Clothing: Other (comment) (clothing bag to pacu) Please provide this summary of care documentation to your next provider. Signatures-by signing, you are acknowledging that this After Visit Summary has been reviewed with you and you have received a copy. Patient Signature:  ____________________________________________________________ Date:  ____________________________________________________________  
  
Dylan Petersen Provider Signature:  ____________________________________________________________ Date:  ____________________________________________________________

## 2018-08-14 LAB
ANION GAP SERPL CALC-SCNC: 2 MMOL/L (ref 5–15)
BASOPHILS # BLD: 0 K/UL (ref 0–0.1)
BASOPHILS NFR BLD: 0 % (ref 0–1)
BUN SERPL-MCNC: 10 MG/DL (ref 6–20)
BUN/CREAT SERPL: 10 (ref 12–20)
CALCIUM SERPL-MCNC: 8 MG/DL (ref 8.5–10.1)
CHLORIDE SERPL-SCNC: 109 MMOL/L (ref 97–108)
CO2 SERPL-SCNC: 28 MMOL/L (ref 21–32)
CREAT SERPL-MCNC: 1.01 MG/DL (ref 0.55–1.02)
DIFFERENTIAL METHOD BLD: ABNORMAL
EOSINOPHIL # BLD: 0 K/UL (ref 0–0.4)
EOSINOPHIL NFR BLD: 0 % (ref 0–7)
ERYTHROCYTE [DISTWIDTH] IN BLOOD BY AUTOMATED COUNT: 12.5 % (ref 11.5–14.5)
GLUCOSE SERPL-MCNC: 116 MG/DL (ref 65–100)
HCT VFR BLD AUTO: 33 % (ref 35–47)
HGB BLD-MCNC: 10.8 G/DL (ref 11.5–16)
IMM GRANULOCYTES # BLD: 0 K/UL (ref 0–0.04)
IMM GRANULOCYTES NFR BLD AUTO: 0 % (ref 0–0.5)
LYMPHOCYTES # BLD: 1.5 K/UL (ref 0.8–3.5)
LYMPHOCYTES NFR BLD: 15 % (ref 12–49)
MCH RBC QN AUTO: 30.2 PG (ref 26–34)
MCHC RBC AUTO-ENTMCNC: 32.7 G/DL (ref 30–36.5)
MCV RBC AUTO: 92.2 FL (ref 80–99)
MONOCYTES # BLD: 0.6 K/UL (ref 0–1)
MONOCYTES NFR BLD: 6 % (ref 5–13)
NEUTS SEG # BLD: 7.9 K/UL (ref 1.8–8)
NEUTS SEG NFR BLD: 79 % (ref 32–75)
NRBC # BLD: 0 K/UL (ref 0–0.01)
NRBC BLD-RTO: 0 PER 100 WBC
PLATELET # BLD AUTO: 171 K/UL (ref 150–400)
PMV BLD AUTO: 9.4 FL (ref 8.9–12.9)
POTASSIUM SERPL-SCNC: 4.2 MMOL/L (ref 3.5–5.1)
RBC # BLD AUTO: 3.58 M/UL (ref 3.8–5.2)
SODIUM SERPL-SCNC: 139 MMOL/L (ref 136–145)
WBC # BLD AUTO: 10.1 K/UL (ref 3.6–11)

## 2018-08-14 PROCEDURE — 85025 COMPLETE CBC W/AUTO DIFF WBC: CPT | Performed by: OBSTETRICS & GYNECOLOGY

## 2018-08-14 PROCEDURE — 74011250637 HC RX REV CODE- 250/637: Performed by: OBSTETRICS & GYNECOLOGY

## 2018-08-14 PROCEDURE — 36415 COLL VENOUS BLD VENIPUNCTURE: CPT | Performed by: OBSTETRICS & GYNECOLOGY

## 2018-08-14 PROCEDURE — 65410000002 HC RM PRIVATE OB

## 2018-08-14 PROCEDURE — 74011250636 HC RX REV CODE- 250/636: Performed by: NURSE PRACTITIONER

## 2018-08-14 PROCEDURE — 74011250636 HC RX REV CODE- 250/636: Performed by: OBSTETRICS & GYNECOLOGY

## 2018-08-14 PROCEDURE — 74011000258 HC RX REV CODE- 258: Performed by: OBSTETRICS & GYNECOLOGY

## 2018-08-14 PROCEDURE — 80048 BASIC METABOLIC PNL TOTAL CA: CPT | Performed by: OBSTETRICS & GYNECOLOGY

## 2018-08-14 PROCEDURE — 74011250637 HC RX REV CODE- 250/637: Performed by: NURSE PRACTITIONER

## 2018-08-14 RX ORDER — ACETAMINOPHEN 325 MG/1
650 TABLET ORAL
Status: DISCONTINUED | OUTPATIENT
Start: 2018-08-14 | End: 2018-08-15 | Stop reason: HOSPADM

## 2018-08-14 RX ORDER — KETOROLAC TROMETHAMINE 30 MG/ML
30 INJECTION, SOLUTION INTRAMUSCULAR; INTRAVENOUS EVERY 6 HOURS
Status: DISCONTINUED | OUTPATIENT
Start: 2018-08-14 | End: 2018-08-15 | Stop reason: SDUPTHER

## 2018-08-14 RX ADMIN — KETOROLAC TROMETHAMINE 30 MG: 30 INJECTION, SOLUTION INTRAMUSCULAR at 08:57

## 2018-08-14 RX ADMIN — KETOROLAC TROMETHAMINE 30 MG: 30 INJECTION, SOLUTION INTRAMUSCULAR at 15:27

## 2018-08-14 RX ADMIN — CEFAZOLIN 1 G: 1 INJECTION, POWDER, FOR SOLUTION INTRAMUSCULAR; INTRAVENOUS at 05:43

## 2018-08-14 RX ADMIN — SODIUM CHLORIDE 125 ML/HR: 900 INJECTION, SOLUTION INTRAVENOUS at 13:53

## 2018-08-14 RX ADMIN — ENOXAPARIN SODIUM 40 MG: 100 INJECTION SUBCUTANEOUS at 13:05

## 2018-08-14 RX ADMIN — ONDANSETRON 4 MG: 2 INJECTION, SOLUTION INTRAMUSCULAR; INTRAVENOUS at 06:45

## 2018-08-14 RX ADMIN — OXYCODONE HYDROCHLORIDE AND ACETAMINOPHEN 1 TABLET: 5; 325 TABLET ORAL at 06:43

## 2018-08-14 RX ADMIN — ACETAMINOPHEN 650 MG: 325 TABLET ORAL at 13:05

## 2018-08-14 NOTE — PROGRESS NOTES
[address],  [place], [address]  Phone [phone]  Fax: [phone]    Admit Date: 2018  Post-Operative Day: 1 Day Post-Op from Procedure(s):  EXPLORATORY LAPAROTOMY, RESECTION OF MASS, TOTAL ABDOMINAL HYSTERECTOMY, BILATERAL SALPINGO OOPHORECTOMY     Subjective:   Pt did fairly well over night. Had some nausea, but says it is improved this morning. Tolerating clear liquids well. Very hesitant to mobilize. Says pain is controlled, but is fearful of it with mobilizing. Doing IS well. Objective:     Blood pressure 94/57, pulse 62, temperature 98.2 °F (36.8 °C), resp. rate 16, height 5' 6\" (1.676 m), weight 122 lb (55.3 kg), SpO2 99 %. Temp (24hrs), Av.9 °F (36.6 °C), Min:97.5 °F (36.4 °C), Max:98.2 °F (36.8 °C)      _____________________  Physical Exam:     General: A&O X 3  in no acute distress. Cardiovascular: Regular without M/R/G  Lungs:CTA bilat without wheezing or rales  Abdomen: Soft, appropriately tender, non-distended. Dsg D/I with + bs throughout. No flatus yet. Ext: + pedal pulses without edema bilat. SCD's and Compression boots in place bilat  Neuro: grossly intact      Assessment:   Active Problems:    GERD (gastroesophageal reflux disease) (10/26/2010)      Osteoporosis, post-menopausal (10/1/2015)      Hyperlipidemia (2016)      Essential hypertension (2016)      Pelvic mass (2018)            Plan:   Continue to mobilize throughout the day  Hold Cozaar for SBP less than 100  Hydrated. Advance to full liquid as tolerated. Continue IS   Seen with Dr. Nivia Evanso, NP  2018    Data Review:    Recent Labs      18   0437   WBC  10.1   HGB  10.8*   HCT  33.0*   PLT  171     Recent Labs      18   0437   NA  139   K  4.2   CL  109*   CO2  28   GLU  116*   BUN  10   CREA  1.01   CA  8.0*     No results for input(s): AML, LPSE in the last 72 hours.         ______________________  Medications:    Current Facility-Administered Medications   Medication Dose Route Frequency Provider Last Rate Last Dose    losartan (COZAAR) tablet 50 mg  50 mg Oral DAILY Natty Camacho MD   50 mg at 08/13/18 1211    sodium chloride (NS) flush 5-10 mL  5-10 mL IntraVENous Q8H Natty Camacho MD   10 mL at 08/13/18 1503    sodium chloride (NS) flush 5-10 mL  5-10 mL IntraVENous PRN Natty Camacho MD        naloxone [facility]) injection 0.4 mg  0.4 mg IntraVENous PRN Natty Camacho MD        LORazepam (ATIVAN) injection 1 mg  1 mg IntraVENous Q6H PRN Natty Camacho MD   1 mg at 08/13/18 2055    zolpidem (AMBIEN) tablet 5 mg  5 mg Oral QHS PRN Natty Camacho MD        ondansetron [facility]) injection 4 mg  4 mg IntraVENous Q4H PRN Natty Camacho MD   4 mg at 08/14/18 0645    ketorolac (TORADOL) injection 30 mg  30 mg IntraVENous Q6H Natty Camacho MD   Stopped at 08/14/18 0300    oxyCODONE-acetaminophen (PERCOCET) 5-325 mg per tablet 1 Tab  1 Tab Oral Q4H PRN Natty Camacho MD   1 Tab at 08/14/18 0643    morphine injection 2 mg  2 mg IntraVENous Q2H PRN Natty Camacho MD   2 mg at 08/13/18 1212    diphenhydrAMINE (BENADRYL) injection 12.5 mg  12.5 mg IntraVENous Q4H PRN Natty Camacho MD        enoxaparin (LOVENOX) injection 40 mg  40 mg SubCUTAneous Q24H Natty Camacho MD   40 mg at 08/13/18 1210    prochlorperazine (COMPAZINE) injection 10 mg  10 mg IntraVENous Q3H PRN Natty Camacho MD        0.9% sodium chloride infusion  125 mL/hr IntraVENous CONTINUOUS Natty Camacho  mL/hr at 08/13/18 2127 125 mL/hr at 08/13/18 2127

## 2018-08-14 NOTE — PROGRESS NOTES
Problem: Falls - Risk of  Goal: *Absence of Falls  Document Abilio Fall Risk and appropriate interventions in the flowsheet. Outcome: Progressing Towards Goal  Fall Risk Interventions:  Mobility Interventions: Patient to call before getting OOB         Medication Interventions: Patient to call before getting OOB    Elimination Interventions: Call light in reach             Problem: Pressure Injury - Risk of  Goal: *Prevention of pressure injury  Document Tan Scale and appropriate interventions in the flowsheet. Outcome: Progressing Towards Goal  Pressure Injury Interventions:             Activity Interventions: Increase time out of bed         Nutrition Interventions: Document food/fluid/supplement intake

## 2018-08-14 NOTE — PROGRESS NOTES
Bedside and Verbal shift change report given to Bambi Salinas RN (oncoming nurse) by Jaleel Harvey RN (offgoing nurse). Report included the following information SBAR, Kardex, Procedure Summary, Intake/Output, Accordion and Med Rec Status.

## 2018-08-15 VITALS
WEIGHT: 122 LBS | RESPIRATION RATE: 16 BRPM | DIASTOLIC BLOOD PRESSURE: 83 MMHG | BODY MASS INDEX: 19.61 KG/M2 | OXYGEN SATURATION: 98 % | SYSTOLIC BLOOD PRESSURE: 145 MMHG | HEART RATE: 60 BPM | HEIGHT: 66 IN | TEMPERATURE: 97.8 F

## 2018-08-15 PROCEDURE — 74011250637 HC RX REV CODE- 250/637: Performed by: NURSE PRACTITIONER

## 2018-08-15 PROCEDURE — 74011250636 HC RX REV CODE- 250/636: Performed by: OBSTETRICS & GYNECOLOGY

## 2018-08-15 RX ORDER — AMOXICILLIN 250 MG
1-2 CAPSULE ORAL
Qty: 60 TAB | Refills: 3 | Status: SHIPPED | OUTPATIENT
Start: 2018-08-15 | End: 2018-09-04 | Stop reason: ALTCHOICE

## 2018-08-15 RX ORDER — KETOROLAC TROMETHAMINE 30 MG/ML
30 INJECTION, SOLUTION INTRAMUSCULAR; INTRAVENOUS
Status: DISCONTINUED | OUTPATIENT
Start: 2018-08-15 | End: 2018-08-15 | Stop reason: HOSPADM

## 2018-08-15 RX ORDER — AMOXICILLIN 250 MG
1 CAPSULE ORAL DAILY
Status: DISCONTINUED | OUTPATIENT
Start: 2018-08-15 | End: 2018-08-15 | Stop reason: HOSPADM

## 2018-08-15 RX ORDER — HYDROCODONE BITARTRATE AND ACETAMINOPHEN 5; 325 MG/1; MG/1
1 TABLET ORAL
Qty: 20 TAB | Refills: 0 | Status: SHIPPED | OUTPATIENT
Start: 2018-08-15 | End: 2018-09-04 | Stop reason: ALTCHOICE

## 2018-08-15 RX ADMIN — ACETAMINOPHEN 650 MG: 325 TABLET ORAL at 09:57

## 2018-08-15 RX ADMIN — Medication 10 ML: at 05:00

## 2018-08-15 RX ADMIN — DOCUSATE SODIUM AND SENNOSIDES 1 TABLET: 8.6; 5 TABLET, FILM COATED ORAL at 10:00

## 2018-08-15 RX ADMIN — ENOXAPARIN SODIUM 40 MG: 100 INJECTION SUBCUTANEOUS at 11:30

## 2018-08-15 RX ADMIN — LOSARTAN POTASSIUM 50 MG: 50 TABLET ORAL at 09:57

## 2018-08-15 RX ADMIN — ACETAMINOPHEN 650 MG: 325 TABLET ORAL at 04:05

## 2018-08-15 NOTE — PROGRESS NOTES
0730 Bedside shift change report given to Sp RN (oncoming nurse) by Joey Denny RN (offgoing nurse). Report included the following information SBAR.     8646 I have reviewed discharge instructions with the patient. The patient verbalized understanding.

## 2018-08-15 NOTE — DISCHARGE INSTRUCTIONS
[address], [address], [address]  P [phone]  F [phone]      Patient Discharge Instructions    Luis Boucher / [id number] : 1960    Admitted 2018 Discharged: 8/15/2018     Take Home Medications     No current facility-administered medications on file prior to encounter. Current Outpatient Prescriptions on File Prior to Encounter   Medication Sig Dispense Refill    Biotin 2,500 mcg cap Take  by mouth.  losartan (COZAAR) 50 mg tablet TAKE 1 TAB BY MOUTH DAILY. 30 Tab 4    cholecalciferol, vitamin D3, 2,000 unit tab Take 2,000 Units by mouth daily.  B.infantis-B.ani-B.long-B.bifi (PROBIOTIC 4X) 10-15 mg TbEC Take 1 Tab by mouth.  CALCIUM CARBONATE/VITAMIN D3 (CALCIUM + D PO) Take 1 Tab by mouth daily. Total 770 mg daily calcium and 1000 units vitamin d       MULTIVITAMIN PO Take 1 Tab by mouth daily. · It is important that you take the medication exactly as they are prescribed. · Keep your medication in the bottles provided by the pharmacist and keep a list of the medication names, dosages, and times to be taken in your wallet. · Do not take other medications without consulting your doctor. What to do at Home    Recommended diet: Regular, low fiber until bowels back to normal, stay hydrated. You should take a stool softener such as colace for constipation. If constipation persists for >24 hours you should take a dose of Milk of Magnesia. Call if your constipation persists. Since you have had a hysterectomy or vaginal surgery you may experience vaginal spotting. This is acceptable. Should the bleeding require more that 4 pads a day please call our office. Nothing per vagina for 6-8 weeks. Recommended activity:   No driving for 2 week and/or while on pain medication  Walk at least 6 times per day  Showers okay, no tub bathing/swimming for two weeks  Activity as able, stairs okay.   Siince you had an open procedure, no heavy lifting greater than 15 lbs for 6 weeks. If you experience any of the following persisting symptoms:    Nausea/vomiting, fever > 101 F, diarrhea/constipation, increasing pain despite medication, increasing vaginal discharge or any concerns, please call our office. Follow-up with Dr. Nolberto Dubois in 3-4. Call [phone] for an appointment. Information obtained by :  I understand that if any problems occur once I am at home I am to contact my physician. I understand and acknowledge receipt of the instructions indicated above.                                                                                                                                            Physician's or R.N.'s Signature                                                                  Date/Time                                                                                                                                              Patient or Representative Signature                                                          Date/Time

## 2018-08-15 NOTE — PROGRESS NOTES
Spiritual Care Partner Volunteer visited patient in Rm 305 on 8/15/2018.   Documented by:  Chaplain Soares MDiv, MS, [address] [place]  [address] [place] [address] ([id number])

## 2018-08-15 NOTE — PROGRESS NOTES
[address],  [place], [address]  Phone [phone]  Fax: [phone]    Admit Date: 2018  Post-Operative Day: 2 Day Post-Op from Procedure(s):  EXPLORATORY LAPAROTOMY, RESECTION OF MASS, TOTAL ABDOMINAL HYSTERECTOMY, BILATERAL SALPINGO OOPHORECTOMY     Subjective:   Pt did well over night, but says she does not sleep well in the hospital.    No further nausea and is tolerating full liquids well. Began passing flatus this morning  Continues ambulating without any difficulty  Says pain is controlled  Doing IS well. Objective:     Blood pressure 132/80, pulse 63, temperature 98.1 °F (36.7 °C), resp. rate 16, height 5' 6\" (1.676 m), weight 122 lb (55.3 kg), SpO2 93 %. Temp (24hrs), Av.1 °F (36.7 °C), Min:98 °F (36.7 °C), Max:98.2 °F (36.8 °C)      _____________________  Physical Exam:     General: A&O X 3  in no acute distress. Cardiovascular: Regular without M/R/G  Lungs:CTA bilat without wheezing or rales  Abdomen: Soft, appropriately tender, non-distended. Dsg D/I with + bs throughout. Ext: + pedal pulses without edema bilat. SCD's and Compression boots in place bilat  Neuro: grossly intact      Assessment:   Active Problems:    GERD (gastroesophageal reflux disease) (10/26/2010)      Osteoporosis, post-menopausal (10/1/2015)      Hyperlipidemia (2016)      Essential hypertension (2016)      Pelvic mass (2018)            Plan:   Continue to mobilize throughout the day  Hold Cozaar for SBP less than 100 (improved today)  Advance to regular diet  Will monitor later today and if remains stable/improving, will plan on discharging home this evening.  Pt very happy about this possibility   Continue IS       Dyan Martinez NP  8/15/2018    Data Review:    Recent Labs      18   0437   WBC  10.1   HGB  10.8*   HCT  33.0*   PLT  171     Recent Labs      18   0437   NA  139   K  4.2   CL  109*   CO2  28   GLU  116*   BUN  10   CREA  1.01 CA  8.0*     No results for input(s): AML, LPSE in the last 72 hours.         ______________________  Medications:    Current Facility-Administered Medications   Medication Dose Route Frequency Provider Last Rate Last Dose    acetaminophen (TYLENOL) tablet 650 mg  650 mg Oral Q6H PRN Beverly Martinez, NP   650 mg at 08/15/18 0405    ketorolac (TORADOL) injection 30 mg  30 mg IntraVENous Q6H Beverlyshirlene Martinez, NP   30 mg at 08/14/18 1527    losartan (COZAAR) tablet 50 mg  50 mg Oral DAILY Beverlyshirlene Martinez, NP   Stopped at 08/14/18 0900    sodium chloride (NS) flush 5-10 mL  5-10 mL IntraVENous Q8H Ceferino Gregorio MD   10 mL at 08/15/18 0500    sodium chloride (NS) flush 5-10 mL  5-10 mL IntraVENous PRN Ceferino Gregorio MD        [facility]) injection 0.4 mg  0.4 mg IntraVENous PRN Ceferino Gregorio MD        LORazepam (ATIVAN) injection 1 mg  1 mg IntraVENous Q6H PRN Ceferino Gregorio MD   1 mg at 08/13/18 2055    zolpidem (AMBIEN) tablet 5 mg  5 mg Oral QHS PRN Ceferino Gregorio MD        ondansetron [facility]) injection 4 mg  4 mg IntraVENous Q4H PRN Ceferino Gregorio MD   4 mg at 08/14/18 0645    oxyCODONE-acetaminophen (PERCOCET) 5-325 mg per tablet 1 Tab  1 Tab Oral Q4H PRN Ceferino Gregorio MD   1 Tab at 08/14/18 0643    morphine injection 2 mg  2 mg IntraVENous Q2H PRN Ceferino Gregorio MD   2 mg at 08/13/18 1212    diphenhydrAMINE (BENADRYL) injection 12.5 mg  12.5 mg IntraVENous Q4H PRN Ceferino Gregorio MD        enoxaparin (LOVENOX) injection 40 mg  40 mg SubCUTAneous Q24H Ceferino Gregorio MD   40 mg at 08/14/18 1305    prochlorperazine (COMPAZINE) injection 10 mg  10 mg IntraVENous Q3H PRN Ceferino Gregorio MD        0.9% sodium chloride infusion  125 mL/hr IntraVENous CONTINUOUS Ceferino Gregorio MD   Stopped at 08/14/18 6935

## 2018-08-15 NOTE — PROGRESS NOTES
Bedside and Verbal shift change report given to Xiomara Montenegro RN (oncoming nurse) by Paz Gupta RN (offgoing nurse). Report included the following information SBAR, Kardex, OR Summary, Procedure Summary, Intake/Output, MAR and Accordion.

## 2018-08-15 NOTE — PROGRESS NOTES
Bedside and Verbal shift change report given to Michelle Arechiga RN (oncoming nurse) by Kate Guo RN (offgoing nurse). Report included the following information SBAR, Kardex, Procedure Summary, Intake/Output, MAR, Accordion and Recent Results. 22:00 Pt refused IV fluids, last /69, pt is drinking regularly, with no signs of dehydration.

## 2018-08-15 NOTE — DISCHARGE SUMMARY
[address] [facility] [address] [facility]  P [phone]  F [phone]        Discharge Summary  Patient ID:  Lisbet Schneider  [id number]  10 y.o.  1960    Admit date: 8/13/2018    PCP: Adwoa Mars MD    Admit MD: Lazaro Bueno MD    Discharge MD: Dr. Jania Espinal    Discharge date and time: August 15, 2018  Admission Diagnoses:     PELVIC MASS  Pelvic mass  Patient Active Problem List   Diagnosis Code    GERD (gastroesophageal reflux disease) K21.9    AR (allergic rhinitis) J30.9    Osteoporosis, post-menopausal M81.0    Hyperlipidemia E78.5    Essential hypertension I10    Pelvic mass R19.00       Discharge Diagnoses:     Ovarian Serous cyst adenofibroma         OR Date: 8/13/2018  OR procedure: Procedure(s):  EXPLORATORY LAPAROTOMY, RESECTION OF MASS, TOTAL ABDOMINAL HYSTERECTOMY, [address] [place] [address] Course:     She was admitted and underwent the above procedures. See op note for details, but briefly she presented to Dr. Ewa Luna. Frozen section revealed benign serous cystadenoma and final path returned as below. She convalesced postop without complication with diet advanced as GI function returned, voiding, ambulating and pain controlled. She was deemed ready for release on POD 2 in the evning afebrile with a benign abd and wound. Followup, meds and education provided as below and reviewed at length with pt      Pathology:      FINAL PATHOLOGIC DIAGNOSIS   1. Ovary and fallopian tube, left salpingo-oophorectomy:   Serous cyst adenofibroma   2.  Uterus and cervix (76 g), ovary, fallopian tube, hysterectomy, right salpingo-oophorectomy:   Cervix: No diagnostic pathologic abnormality   Myometrium: Adenomyosis   Endometrium: Inactive   Ovary and fallopian tube: Serous cystoadenofibroma   Serosa: No diagnostic pathologic abnormality     Consults: None    Significant Diagnostic Studies:  Lab Results   Component Value Date/Time    WBC 10.1 08/14/2018 04:37 AM    ABS. NEUTROPHILS 7.9 08/14/2018 04:37 AM    HGB 10.8 (L) 08/14/2018 04:37 AM    HCT 33.0 (L) 08/14/2018 04:37 AM    MCV 92.2 08/14/2018 04:37 AM    MCH 30.2 08/14/2018 04:37 AM    PLATELET 695 30/09/4684 04:37 AM     Lab Results   Component Value Date/Time    Sodium 139 08/14/2018 04:37 AM    Potassium 4.2 08/14/2018 04:37 AM    Chloride 109 (H) 08/14/2018 04:37 AM    CO2 28 08/14/2018 04:37 AM    Glucose 116 (H) 08/14/2018 04:37 AM    BUN 10 08/14/2018 04:37 AM    Creatinine 1.01 08/14/2018 04:37 AM    Calcium 8.0 (L) 08/14/2018 04:37 AM    Albumin 4.0 08/07/2018 02:30 PM    Bilirubin, total 0.5 08/07/2018 02:30 PM    AST (SGOT) 18 08/07/2018 02:30 PM    ALT (SGPT) 20 08/07/2018 02:30 PM    Alk. phosphatase 52 08/07/2018 02:30 PM       Condition on Discharge: good    Disposition: home    Patient Instructions:   Current Discharge Medication List      START taking these medications    Details   senna-docusate (PERICOLACE) 8.6-50 mg per tablet Take 1-2 Tabs by mouth daily as needed for Constipation. Qty: 60 Tab, Refills: 3      HYDROcodone-acetaminophen (NORCO) 5-325 mg per tablet Take 1 Tab by mouth every four (4) hours as needed for Pain. Max Daily Amount: 6 Tabs. Qty: 20 Tab, Refills: 0    Associated Diagnoses: Acute post-operative pain         CONTINUE these medications which have NOT CHANGED    Details   Cetirizine (ZYRTEC) 10 mg cap Take 10 mg by mouth as needed. ibuprofen 100 mg tablet Take 100 mg by mouth every six (6) hours as needed for Pain. Biotin 2,500 mcg cap Take  by mouth.      losartan (COZAAR) 50 mg tablet TAKE 1 TAB BY MOUTH DAILY. Qty: 30 Tab, Refills: 4    Associated Diagnoses: Essential hypertension with goal blood pressure less than 140/90      cholecalciferol, vitamin D3, 2,000 unit tab Take 2,000 Units by mouth daily. B.infantis-B.ani-B.long-B.bifi (PROBIOTIC 4X) 10-15 mg TbEC Take 1 Tab by mouth.       CALCIUM CARBONATE/VITAMIN D3 (CALCIUM + D PO) Take 1 Tab by mouth daily. Total 770 mg daily calcium and 1000 units vitamin d       MULTIVITAMIN PO Take 1 Tab by mouth daily. Activity: no driving for 2 weeks and/or while on analgesics, no heavy lifting for 6 weeks.   Nothing per vagina until after follow up visit with Dr. Orlando Boothe 6 or more times daily  Showers okay, no tub bathing for 2 weeks if surgery  Stool softner for constipation  Diet: Regular Diet and Encourage fluids  Wound Care: Keep wound clean and dry    Follow-up with Dr. Amado Aguayo in 4 weeks of sooner if needed     Signed:  Leonid Mayer NP  8/15/2018/2:42 PM

## 2018-08-30 ENCOUNTER — TELEPHONE (OUTPATIENT)
Dept: GYNECOLOGY | Age: 58
End: 2018-08-30

## 2018-08-30 NOTE — TELEPHONE ENCOUNTER
I called and spoke with Miladys Larry whom is on the GENNY. He is requesting a note to be faxed to him at [phone] stating she was unable to travel due to surgery.

## 2018-09-04 ENCOUNTER — OFFICE VISIT (OUTPATIENT)
Dept: GYNECOLOGY | Age: 58
End: 2018-09-04

## 2018-09-04 VITALS
HEIGHT: 66 IN | HEART RATE: 68 BPM | BODY MASS INDEX: 18.84 KG/M2 | SYSTOLIC BLOOD PRESSURE: 141 MMHG | WEIGHT: 117.2 LBS | DIASTOLIC BLOOD PRESSURE: 92 MMHG

## 2018-09-04 DIAGNOSIS — R19.00 PELVIC MASS: Primary | ICD-10-CM

## 2018-09-04 NOTE — PROGRESS NOTES
[address] [place] [address] [address] [facility]  P [phone]  F [phone]    Postoperative Office Note  Patient ID:  Name: Lisbet Schneider  MRM: [id number]  : 1960/57 y.o. Date: 2018    Diagnosis:     ICD-10-CM ICD-9-CM    1. Pelvic mass R19.00 789.30        Problem List:   Patient Active Problem List   Diagnosis Code    GERD (gastroesophageal reflux disease) K21.9    AR (allergic rhinitis) J30.9    Osteoporosis, post-menopausal M81.0    Hyperlipidemia E78.5    Essential hypertension I10    Pelvic mass R19.00       SUBJECTIVE:    Lisbet Schneider is a 59 y.o. female who presents for followup postoperative care following X-lap, resection of mass, CAYLA, BSO. The patient's pathology revealed: FINAL PATHOLOGIC DIAGNOSIS   1. Ovary and fallopian tube, left salpingo-oophorectomy:   Serous cyst adenofibroma   2. Uterus and cervix (76 g), ovary, fallopian tube, hysterectomy, right salpingo-oophorectomy:   Cervix: No diagnostic pathologic abnormality   Myometrium: Adenomyosis   Endometrium: Inactive   Ovary and fallopian tube: Serous cystoadenofibroma   Serosa: No diagnostic pathologic abnormality       Currently she has no problems with eating, bowel movements, or voiding  Appetite is good. Eating a regular diet without difficulty. Bowel movements are regular. The patient is not having any pain. .    Medications:     Current Outpatient Prescriptions on File Prior to Visit   Medication Sig Dispense Refill    losartan (COZAAR) 50 mg tablet TAKE 1 TAB BY MOUTH DAILY. 30 Tab 4    Cetirizine (ZYRTEC) 10 mg cap Take 10 mg by mouth as needed.  ibuprofen 100 mg tablet Take 100 mg by mouth every six (6) hours as needed for Pain.  Biotin 2,500 mcg cap Take  by mouth.  cholecalciferol, vitamin D3, 2,000 unit tab Take 2,000 Units by mouth daily.  B.infantis-B.ani-B.long-B.bifi (PROBIOTIC 4X) 10-15 mg TbEC Take 1 Tab by mouth.      [facility] D3 (CALCIUM + D PO) Take 1 Tab by mouth daily. Total 770 mg daily calcium and 1000 units vitamin d       MULTIVITAMIN PO Take 1 Tab by mouth daily. No current facility-administered medications on file prior to visit.       Allergies:   No Known Allergies  Past Medical History:   Diagnosis Date    Abnormal Pap smear     h/o    Allergic rhinitis due to other allergen     Arrhythmia     PAC'S - [place]IL[place] TEA CAUSES    Esophageal reflux     Herpes zoster     Hypertension     Mixed hyperlipidemia     Nausea & vomiting     Osteopenia     Other atopic dermatitis and related conditions     Shingles rash 10/2015    small spot on left shoulder    Unspecified spontaneous  without mention of complication     X2     Past Surgical History:   Procedure Laterality Date    COLPOSCOPY  29 y/o    with cryo    DILATION AND CURETTAGE      HX COLONOSCOPY      HX WRIST FRACTURE TX Right     WRIST REPAIR     Social History     Social History    Marital status:      Spouse name: Susi Guevara Number of children: 2    Years of education: N/A     Occupational History    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[employer])     Social History Main Topics    Smoking status: Never Smoker    Smokeless tobacco: Never Used    Alcohol use No    Drug use: No    Sexual activity: Yes     Partners: Male     Other Topics Concern    Not on file     Social History Narrative       OBJECTIVE:    Vitals:   Vitals:    18 0934   BP: (!) 141/92   Pulse: 68   Weight: 117 lb 3.2 oz (53.2 kg)   Height: 5' 5.98\" (1.676 m)     Physical Examination:     General:  alert, cooperative, no distress   Lungs: lungs clear to auscultation   Cardiac: Regular rate and rhythm   Abdomen: soft, bowel sounds active, non-tender  No CVA tenderness   Incision: healing well   Pelvic: External genitalia: normal general appearance  Urinary system: urethral meatus normal  Vaginal: normal without tenderness, induration or masses  Cervix: absent  Adnexa: removed surgically  Uterus: absent   Rectal: not done   Extremity:   extremities normal, atraumatic, no cyanosis or edema     IMPRESSION:    Frankey Cancel is doing well postoperatively. She has no apparent complications from surgery. Medical problems:     Patient Active Problem List   Diagnosis Code    GERD (gastroesophageal reflux disease) K21.9    AR (allergic rhinitis) J30.9    Osteoporosis, post-menopausal M81.0    Hyperlipidemia E78.5    Essential hypertension I10    Pelvic mass R19.00       PLAN:    The operative procedures and clinical results have been reviewed with the patient. Implications of diagnosis discussed at length. All questions answered. The patient may return to work. I will see the patient back prn. The patient is advised to call our office with any problems or concerns.     Soo Hagan MD  9/4/2018/9:34 AM

## 2018-11-16 DIAGNOSIS — I10 ESSENTIAL HYPERTENSION WITH GOAL BLOOD PRESSURE LESS THAN 140/90: ICD-10-CM

## 2018-11-16 RX ORDER — LOSARTAN POTASSIUM 50 MG/1
TABLET ORAL
Qty: 30 TAB | Refills: 4 | Status: SHIPPED | OUTPATIENT
Start: 2018-11-16 | End: 2018-12-28

## 2018-12-28 ENCOUNTER — OFFICE VISIT (OUTPATIENT)
Dept: FAMILY MEDICINE CLINIC | Age: 58
End: 2018-12-28

## 2018-12-28 VITALS
OXYGEN SATURATION: 99 % | TEMPERATURE: 97.4 F | WEIGHT: 119.6 LBS | DIASTOLIC BLOOD PRESSURE: 84 MMHG | RESPIRATION RATE: 16 BRPM | HEIGHT: 66 IN | HEART RATE: 60 BPM | BODY MASS INDEX: 19.22 KG/M2 | SYSTOLIC BLOOD PRESSURE: 124 MMHG

## 2018-12-28 DIAGNOSIS — Z12.31 SCREENING MAMMOGRAM, ENCOUNTER FOR: ICD-10-CM

## 2018-12-28 DIAGNOSIS — R79.89 LOW VITAMIN D LEVEL: ICD-10-CM

## 2018-12-28 DIAGNOSIS — I10 ESSENTIAL HYPERTENSION WITH GOAL BLOOD PRESSURE LESS THAN 140/90: ICD-10-CM

## 2018-12-28 DIAGNOSIS — Z00.00 GENERAL MEDICAL EXAM: Primary | ICD-10-CM

## 2018-12-28 DIAGNOSIS — Z12.11 COLON CANCER SCREENING: ICD-10-CM

## 2018-12-28 RX ORDER — IRBESARTAN 75 MG/1
75 TABLET ORAL
Qty: 30 TAB | Refills: 2 | Status: SHIPPED | OUTPATIENT
Start: 2018-12-28 | End: 2019-04-01 | Stop reason: SDUPTHER

## 2018-12-28 NOTE — PROGRESS NOTES
Subjective:   59 y.o. female for Well Woman Check. Her gyne and breast care is done elsewhere by her Ob-Gyne physician. Patient Active Problem List    Diagnosis Date Noted    Pelvic mass 2018    Hyperlipidemia 2016    Essential hypertension 2016    Osteoporosis, post-menopausal 10/01/2015    GERD (gastroesophageal reflux disease) 10/26/2010    AR (allergic rhinitis) 10/26/2010     Current Outpatient Medications   Medication Sig Dispense Refill    irbesartan (AVAPRO) 75 mg tablet Take 1 Tab by mouth nightly. 30 Tab 2    Cetirizine (ZYRTEC) 10 mg cap Take 10 mg by mouth as needed.  ibuprofen 100 mg tablet Take 100 mg by mouth every six (6) hours as needed for Pain.  Biotin 2,500 mcg cap Take  by mouth.  cholecalciferol, vitamin D3, 2,000 unit tab Take 2,000 Units by mouth daily.  B.infantis-B.ani-B.long-B.bifi (PROBIOTIC 4X) 10-15 mg TbEC Take 1 Tab by mouth.  CALCIUM CARBONATE/VITAMIN D3 (CALCIUM + D PO) Take 1 Tab by mouth daily. Total 770 mg daily calcium and 1000 units vitamin d       MULTIVITAMIN PO Take 1 Tab by mouth daily.        No Known Allergies  Past Medical History:   Diagnosis Date    Abnormal Pap smear     h/o    Allergic rhinitis due to other allergen     Arrhythmia     PAC'S - CHAMOMILLE TEA CAUSES    Esophageal reflux     Herpes zoster     Hypertension     Mixed hyperlipidemia     Nausea & vomiting     Osteopenia     Other atopic dermatitis and related conditions     Shingles rash 10/2015    small spot on left shoulder    Unspecified spontaneous  without mention of complication     X2     Past Surgical History:   Procedure Laterality Date    COLPOSCOPY  31 y/o    with cryo    DILATION AND CURETTAGE      HX COLONOSCOPY      HX WRIST FRACTURE TX Right     WRIST REPAIR     Family History   Problem Relation Age of Onset    Hypertension Mother     Heart Disease Mother         CHF    Cancer Mother         kidney    Heart Attack Father     Coronary Artery Disease Father         with pacemaker    Asthma Father     Heart Disease Father         pacemaker    Stroke Maternal Grandmother     Heart Attack Paternal Grandfather     Anesth Problems Neg Hx      Social History     Tobacco Use    Smoking status: Never Smoker    Smokeless tobacco: Never Used   Substance Use Topics    Alcohol use: No     Alcohol/week: 0.5 oz     Types: 1 Glasses of wine per week        Specific concerns today: Patient is requesting to change losartan to another medication. Stating that is it causes cancer. She is taking Losartin 50 mg daily. Review of Systems  A comprehensive review of systems was negative except for that written in the HPI. Objective:   Blood pressure 124/84, pulse 60, temperature 97.4 °F (36.3 °C), temperature source Oral, resp. rate 16, height 5' 5.98\" (1.676 m), weight 119 lb 9.6 oz (54.3 kg), SpO2 99 %.    Physical Examination:   General appearance - alert, well appearing, and in no distress  Mental status - alert, oriented to person, place, and time  Eyes - pupils equal and reactive, extraocular eye movements intact  Ears - bilateral TM's and external ear canals normal  Nose - normal and patent, no erythema, discharge or polyps  Mouth - mucous membranes moist, pharynx normal without lesions  Neck - supple, no significant adenopathy  Lymphatics - no palpable lymphadenopathy, no hepatosplenomegaly  Chest - clear to auscultation, no wheezes, rales or rhonchi, symmetric air entry  Heart - normal rate, regular rhythm, normal S1, S2, no murmurs, rubs, clicks or gallops  Abdomen - soft, nontender, nondistended, no masses or organomegaly  Neurological - alert, oriented, normal speech, no focal findings or movement disorder noted  Musculoskeletal - no joint tenderness, deformity or swelling  Extremities - peripheral pulses normal, no pedal edema, no clubbing or cyanosis  Skin - normal coloration and turgor, no rashes, no suspicious skin lesions noted     Assessment/Plan:     continue present plan, routine labs ordered    ICD-10-CM ICD-9-CM    1. General medical exam Z00.00 V70.9 CBC W/O DIFF      METABOLIC PANEL, COMPREHENSIVE      LIPID PANEL      TSH 3RD GENERATION      T4, FREE   2. Colon cancer screening Z12.11 V76.51 REFERRAL TO GASTROENTEROLOGY   3. Low vitamin D level R79.89 790.6 VITAMIN D, 25 HYDROXY   4. Essential hypertension with goal blood pressure less than 140/90 I10 401.9 irbesartan (AVAPRO) 75 mg tablet   5.  Screening mammogram, encounter for Z12.31 V76.12 EVA MAMMO BI SCREENING INCL CAD   Pt was given an after visit summary which includes diagnosis, current medicines and vital and voiced understanding of treatment plan

## 2018-12-28 NOTE — PROGRESS NOTES
Chief Complaint   Patient presents with    Complete Physical     Yearly Physical.    Labs     Fasting     1. Have you been to the ER, urgent care clinic since your last visit? Hospitalized since your last visit? No    2. Have you seen or consulted any other health care providers outside of the [address] [place] [address] since your last visit? Include any pap smears or colon screening.  No

## 2018-12-29 LAB
25(OH)D3+25(OH)D2 SERPL-MCNC: 55.1 NG/ML (ref 30–100)
ALBUMIN SERPL-MCNC: 4.2 G/DL (ref 3.6–4.8)
ALBUMIN/GLOB SERPL: 1.8 {RATIO} (ref 1.2–2.2)
ALP SERPL-CCNC: 54 IU/L (ref 39–117)
ALT SERPL-CCNC: 16 IU/L (ref 0–32)
AST SERPL-CCNC: 17 IU/L (ref 0–40)
BILIRUB SERPL-MCNC: 0.4 MG/DL (ref 0–1.2)
BUN SERPL-MCNC: 12 MG/DL (ref 8–27)
BUN/CREAT SERPL: 14 (ref 12–28)
CALCIUM SERPL-MCNC: 9.2 MG/DL (ref 8.7–10.3)
CHLORIDE SERPL-SCNC: 104 MMOL/L (ref 96–106)
CHOLEST SERPL-MCNC: 218 MG/DL (ref 100–199)
CO2 SERPL-SCNC: 26 MMOL/L (ref 20–29)
CREAT SERPL-MCNC: 0.84 MG/DL (ref 0.57–1)
ERYTHROCYTE [DISTWIDTH] IN BLOOD BY AUTOMATED COUNT: 13.6 % (ref 12.3–15.4)
GLOBULIN SER CALC-MCNC: 2.3 G/DL (ref 1.5–4.5)
GLUCOSE SERPL-MCNC: 77 MG/DL (ref 65–99)
HCT VFR BLD AUTO: 39.3 % (ref 34–46.6)
HDLC SERPL-MCNC: 72 MG/DL
HGB BLD-MCNC: 12.7 G/DL (ref 11.1–15.9)
INTERPRETATION, 910389: NORMAL
LDLC SERPL CALC-MCNC: 133 MG/DL (ref 0–99)
MCH RBC QN AUTO: 29.2 PG (ref 26.6–33)
MCHC RBC AUTO-ENTMCNC: 32.3 G/DL (ref 31.5–35.7)
MCV RBC AUTO: 90 FL (ref 79–97)
PLATELET # BLD AUTO: 195 X10E3/UL (ref 150–379)
POTASSIUM SERPL-SCNC: 4.4 MMOL/L (ref 3.5–5.2)
PROT SERPL-MCNC: 6.5 G/DL (ref 6–8.5)
RBC # BLD AUTO: 4.35 X10E6/UL (ref 3.77–5.28)
SODIUM SERPL-SCNC: 143 MMOL/L (ref 134–144)
T4 FREE SERPL-MCNC: 1.18 NG/DL (ref 0.82–1.77)
TRIGL SERPL-MCNC: 63 MG/DL (ref 0–149)
TSH SERPL DL<=0.005 MIU/L-ACNC: 1.18 UIU/ML (ref 0.45–4.5)
VLDLC SERPL CALC-MCNC: 13 MG/DL (ref 5–40)
WBC # BLD AUTO: 4.5 X10E3/UL (ref 3.4–10.8)

## 2019-03-01 ENCOUNTER — HOSPITAL ENCOUNTER (EMERGENCY)
Age: 59
Discharge: HOME | End: 2019-03-01
Attending: EMERGENCY MEDICINE
Payer: COMMERCIAL

## 2019-03-01 ENCOUNTER — APPOINTMENT (OUTPATIENT)
Dept: CT IMAGING | Age: 59
End: 2019-03-01
Attending: EMERGENCY MEDICINE
Payer: COMMERCIAL

## 2019-03-01 VITALS
RESPIRATION RATE: 18 BRPM | HEART RATE: 61 BPM | TEMPERATURE: 97.5 F | SYSTOLIC BLOOD PRESSURE: 144 MMHG | OXYGEN SATURATION: 98 % | DIASTOLIC BLOOD PRESSURE: 94 MMHG

## 2019-03-01 DIAGNOSIS — R51.9 NONINTRACTABLE HEADACHE, UNSPECIFIED CHRONICITY PATTERN, UNSPECIFIED HEADACHE TYPE: Primary | ICD-10-CM

## 2019-03-01 LAB
COMMENT, HOLDF: NORMAL
SAMPLES BEING HELD,HOLD: NORMAL

## 2019-03-01 PROCEDURE — 74011250636 HC RX REV CODE- 250/636: Performed by: EMERGENCY MEDICINE

## 2019-03-01 PROCEDURE — 99284 EMERGENCY DEPT VISIT MOD MDM: CPT

## 2019-03-01 PROCEDURE — 96375 TX/PRO/DX INJ NEW DRUG ADDON: CPT

## 2019-03-01 PROCEDURE — 99282 EMERGENCY DEPT VISIT SF MDM: CPT

## 2019-03-01 PROCEDURE — 70450 CT HEAD/BRAIN W/O DYE: CPT

## 2019-03-01 PROCEDURE — 96374 THER/PROPH/DIAG INJ IV PUSH: CPT

## 2019-03-01 RX ORDER — PROCHLORPERAZINE EDISYLATE 5 MG/ML
10 INJECTION INTRAMUSCULAR; INTRAVENOUS
Status: COMPLETED | OUTPATIENT
Start: 2019-03-01 | End: 2019-03-01

## 2019-03-01 RX ORDER — DIPHENHYDRAMINE HYDROCHLORIDE 50 MG/ML
12.5 INJECTION, SOLUTION INTRAMUSCULAR; INTRAVENOUS
Status: COMPLETED | OUTPATIENT
Start: 2019-03-01 | End: 2019-03-01

## 2019-03-01 RX ORDER — KETOROLAC TROMETHAMINE 30 MG/ML
30 INJECTION, SOLUTION INTRAMUSCULAR; INTRAVENOUS
Status: COMPLETED | OUTPATIENT
Start: 2019-03-01 | End: 2019-03-01

## 2019-03-01 RX ADMIN — PROCHLORPERAZINE EDISYLATE 10 MG: 5 INJECTION INTRAMUSCULAR; INTRAVENOUS at 22:44

## 2019-03-01 RX ADMIN — DIPHENHYDRAMINE HYDROCHLORIDE 12.5 MG: 50 INJECTION, SOLUTION INTRAMUSCULAR; INTRAVENOUS at 22:43

## 2019-03-01 RX ADMIN — KETOROLAC TROMETHAMINE 30 MG: 30 INJECTION, SOLUTION INTRAMUSCULAR; INTRAVENOUS at 22:43

## 2019-03-02 NOTE — ED PROVIDER NOTES
59 y.o. female with past medical history significant for HTN and cluster migraines who presents ambulatory to the ED, accompanied by  with chief complaint of constant, waxing and waning frontal HA, onset about 2 weeks ago, with accompanying nausea, generalized weakness, photophobia, L-sided scalp soreness, chills, rhinorrhea, congestion, and myalgias. No lightheadedness, dizziness, diplopia, fever, vomiting, CP or SOB. Pt notes that she went to [facility] 2 weeks ago and was negative for both flu and strep. Pt notes that Excedrin has been helping, but only temporarily. She states that her current sx are similar to her past migraines, but it has never lasted this long. There are no other acute medical concerns at this time. Negative Tobacco use; Negative EtOH use; Negative Illicit Drug Abuse PCP: Oksana Roberson MD 
 
Note written by Pratibha Lawler, as dictated by Sachin Hogue PA-C  9:31 PM  
 
 
The history is provided by the patient and medical records. No  was used. Past Medical History:  
Diagnosis Date  Abnormal Pap smear   
 h/o  Allergic rhinitis due to other allergen  Arrhythmia PAC'S - CHAMOMILLE TEA CAUSES  
 Esophageal reflux  Herpes zoster  Hypertension  Mixed hyperlipidemia  Nausea & vomiting  Osteopenia  Other atopic dermatitis and related conditions  Shingles rash 10/2015  
 small spot on left shoulder  Unspecified spontaneous  without mention of complication X2 Past Surgical History:  
Procedure Laterality Date  COLPOSCOPY  29 y/o  
 with cryo  DILATION AND CURETTAGE    
 HX COLONOSCOPY    HX WRIST FRACTURE TX Right   
 WRIST REPAIR Family History:  
Problem Relation Age of Onset  Hypertension Mother  Heart Disease Mother CHF  Cancer Mother   
     kidney  Heart Attack Father  Coronary Artery Disease Father   
     with pacemaker  Asthma Father  Heart Disease Father   
     pacemaker  Stroke Maternal Grandmother  Heart Attack Paternal Grandfather  Anesth Problems Neg Hx Social History Socioeconomic History  Marital status:  Spouse name: Georgann Burkitt  Number of children: 2  
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  
  Alcohol/week: 0.5 oz Types: 1 Glasses of wine per week  Drug use: No  
 Sexual activity: Yes  
  Partners: Male Other Topics Concern  Not on file Social History Narrative  Not on file ALLERGIES: Patient has no known allergies. Review of Systems Constitutional: Positive for chills. Negative for fever. HENT: Positive for rhinorrhea. Negative for sore throat. Eyes: Positive for photophobia. Negative for visual disturbance. Respiratory: Negative for cough and shortness of breath. Cardiovascular: Negative for chest pain. Gastrointestinal: Positive for nausea. Negative for vomiting. Musculoskeletal: Positive for myalgias (SHOULDERS ). Negative for back pain, neck pain and neck stiffness. Skin: Negative for color change and rash. Neurological: Positive for weakness and headaches. Negative for dizziness and light-headedness. Vitals:  
 03/01/19 2106 Pulse: 72 SpO2: 95% Physical Exam  
 
Constitutional: Oriented to person, place, and time. Appears well-developed and well-nourished. No distress. HENT:  
Head: Normocephalic and atraumatic. Eyes: Conjunctivae and EOM are normal. Pupils are equal, round, and reactive to light. Neck: Normal range of motion. Neck supple. Cardiovascular: Normal rate and regular rhythm. Pulmonary/Chest: Effort normal and breath sounds normal.  
Abdominal: Soft. There is no tenderness. There is no rebound and no guarding. Musculoskeletal: Normal range of motion. Neurological: Alert and oriented to person, place, and time. Displays normal reflexes. No cranial nerve deficit. Exhibits normal muscle tone. Coordination normal.  
Pt able to perform finger-finger, finger-nose 2+AC reflexes bilaterally 2+Patellar reflexes bilaterally Cranial Nerves: 
I: smell  Not tested  
II: pupils  Equal, round, reactive to light  
III,VII: ptosis  none  
III,IV,VI: extraocular muscles   normal  
V: mastication  normal  
V: facial light touch sensation   normal  
VII: facial muscle function   symmetric  
VIII: hearing  symmetric  
IX: soft palate elevation   normal  
XI: sternocleidomastoid strength  5/5  
XII: tongue   midline  
   
  
Skin: Skin is warm and dry. Capillary refill takes less than 2 seconds. No erythema. Psychiatric: Normal mood and affect. Behavior is normal. Judgment and thought content normal.  
 
 
MDM Number of Diagnoses or Management Options Nonintractable headache, unspecified chronicity pattern, unspecified headache type:  
Diagnosis management comments: 60 yo female presenting for headache x 2 week. No fever. No meningeal signs. Low suspicion for ich. P:Ct, iv fluid, pain control 11:41 PM 
Pt reevaluated. Pt feeling better. No pain. No nausea. Pt ready to go. Patient is well hydrated, well appearing, and in no respiratory distress. Physical exam is reassuring, and without signs of serious illness. Will discharge patient home with supportive care, and follow-up with PCP within the next few days. Patient's results have been reviewed with them.   Patient and/or family have verbally conveyed their understanding and agreement of the patient's signs, symptoms, diagnosis, treatment and prognosis and additionally agree to follow up as recommended or return to the Emergency Room should their condition change prior to follow-up. Discharge instructions have also been provided to the patient with some educational information regarding their diagnosis as well a list of reasons why they would want to return to the ER prior to their follow-up appointment should their condition change. Amount and/or Complexity of Data Reviewed Discuss the patient with other providers: yes (ER attending-Kanu) Patient Progress Patient progress: stable Procedures Pt case including HPI, PE, and all available lab and radiology results has been discussed with attending physician. Opportunity to evaluate patient has been provided to ER attending. Discharge and prescription plan has been agreed upon.

## 2019-03-02 NOTE — ED TRIAGE NOTES
Triage: Pt arrives from home with CC of headache and cold symptoms. Pt reports [facility] and [facility] and strep.

## 2019-03-02 NOTE — ED NOTES
Patient has been medically cleared for discharge at this time. Given all discharge papers and education at this time.

## 2019-03-02 NOTE — DISCHARGE INSTRUCTIONS
Increase liquid intake over next 12 hours. Rest.  Return to ER for any fever, chills, vomiting, return of change in pain. Headache: Care Instructions  Your Care Instructions    Headaches have many possible causes. Most headaches aren't a sign of a more serious problem, and they will get better on their own. Home treatment may help you feel better faster. The doctor has checked you carefully, but problems can develop later. If you notice any problems or new symptoms, get medical treatment right away. Follow-up care is a key part of your treatment and safety. Be sure to make and go to all appointments, and call your doctor if you are having problems. It's also a good idea to know your test results and keep a list of the medicines you take. How can you care for yourself at home? · Do not drive if you have taken a prescription pain medicine. · Rest in a quiet, dark room until your headache is gone. Close your eyes and try to relax or go to sleep. Don't watch TV or read. · Put a cold, moist cloth or cold pack on the painful area for 10 to 20 minutes at a time. Put a thin cloth between the cold pack and your skin. · Use a warm, moist towel or a heating pad set on low to relax tight shoulder and neck muscles. · Have someone gently massage your neck and shoulders. · Take pain medicines exactly as directed. ? If the doctor gave you a prescription medicine for pain, take it as prescribed. ? If you are not taking a prescription pain medicine, ask your doctor if you can take an over-the-counter medicine. · Be careful not to take pain medicine more often than the instructions allow, because you may get worse or more frequent headaches when the medicine wears off. · Do not ignore new symptoms that occur with a headache, such as a fever, weakness or numbness, vision changes, or confusion. These may be signs of a more serious problem.   To prevent headaches  · Keep a headache diary so you can figure out what triggers your headaches. Avoiding triggers may help you prevent headaches. Record when each headache began, how long it lasted, and what the pain was like (throbbing, aching, stabbing, or dull). Write down any other symptoms you had with the headache, such as nausea, flashing lights or dark spots, or sensitivity to bright light or loud noise. Note if the headache occurred near your period. List anything that might have triggered the headache, such as certain foods (chocolate, cheese, wine) or odors, smoke, bright light, stress, or lack of sleep. · Find healthy ways to deal with stress. Headaches are most common during or right after stressful times. Take time to relax before and after you do something that has caused a headache in the past.  · Try to keep your muscles relaxed by keeping good posture. Check your jaw, face, neck, and shoulder muscles for tension, and try relaxing them. When sitting at a desk, change positions often, and stretch for 30 seconds each hour. · Get plenty of sleep and exercise. · Eat regularly and well. Long periods without food can trigger a headache. · Treat yourself to a massage. Some people find that regular massages are very helpful in relieving tension. · Limit caffeine by not drinking too much coffee, tea, or soda. But don't quit caffeine suddenly, because that can also give you headaches. · Reduce eyestrain from computers by blinking frequently and looking away from the computer screen every so often. Make sure you have proper eyewear and that your monitor is set up properly, about an arm's length away. · Seek help if you have depression or anxiety. Your headaches may be linked to these conditions. Treatment can both prevent headaches and help with symptoms of anxiety or depression. When should you call for help? Call 911 anytime you think you may need emergency care. For example, call if:    · You have signs of a stroke.  These may include:  ? Sudden numbness, paralysis, or weakness in your face, arm, or leg, especially on only one side of your body. ? Sudden vision changes. ? Sudden trouble speaking. ? Sudden confusion or trouble understanding simple statements. ? Sudden problems with walking or balance. ? A sudden, severe headache that is different from past headaches.    Call your doctor now or seek immediate medical care if:    · You have a new or worse headache.     · Your headache gets much worse. Where can you learn more? Go to http://ilsa-michael.info/. Enter M271 in the search box to learn more about \"Headache: Care Instructions. \"  Current as of: Libia 3, 2018  Content Version: 11.9  © 0351-0418 [employer], [employer]. Care instructions adapted under license by [employer] (which disclaims liability or warranty for this information). If you have questions about a medical condition or this instruction, always ask your healthcare professional. [place] [address] any warranty or liability for your use of this information.

## 2019-03-13 ENCOUNTER — OFFICE VISIT (OUTPATIENT)
Dept: FAMILY MEDICINE CLINIC | Age: 59
End: 2019-03-13

## 2019-03-13 VITALS
SYSTOLIC BLOOD PRESSURE: 122 MMHG | TEMPERATURE: 97.7 F | DIASTOLIC BLOOD PRESSURE: 84 MMHG | HEIGHT: 66 IN | OXYGEN SATURATION: 96 % | HEART RATE: 74 BPM | BODY MASS INDEX: 19.77 KG/M2 | WEIGHT: 123 LBS | RESPIRATION RATE: 16 BRPM

## 2019-03-13 DIAGNOSIS — G44.219 EPISODIC TENSION-TYPE HEADACHE, NOT INTRACTABLE: ICD-10-CM

## 2019-03-13 DIAGNOSIS — F43.9 STRESS: Primary | ICD-10-CM

## 2019-03-13 DIAGNOSIS — G47.09 OTHER INSOMNIA: ICD-10-CM

## 2019-03-13 RX ORDER — CITALOPRAM 20 MG/1
20 TABLET, FILM COATED ORAL DAILY
Qty: 30 TAB | Refills: 1 | Status: SHIPPED | OUTPATIENT
Start: 2019-03-13 | End: 2019-04-04 | Stop reason: SDUPTHER

## 2019-03-13 NOTE — PROGRESS NOTES
HISTORY OF PRESENT ILLNESS  Delio Gallegos is a 59 y.o. female. HPI: Patient is complaining of intermittent severe headaches for one month. She has been to ER and taking Excedrin and Motrin with some relief. She has history of tension headaches and believes is it caused by stress and lack of sleep. She is stressed out due to her 25 old daughter believes she is transgender and would like to change her gender. Her daughter  lives in [place] and [facility]. Her father calls her by he, but she is unable to accept this. This is causing her a great deal of stress that she can't sleep well at night and wakes up with headache. Today denies headache, she just came form massage therapy that helped her to relax . Past Medical History:   Diagnosis Date    Abnormal Pap smear     h/o    Allergic rhinitis due to other allergen     Arrhythmia     PAC'S - CHAMOMILLE TEA CAUSES    Esophageal reflux     Herpes zoster     Hypertension     Mixed hyperlipidemia     Nausea & vomiting     Osteopenia     Other atopic dermatitis and related conditions     Shingles rash 10/2015    small spot on left shoulder    Unspecified spontaneous  without mention of complication     X2     Past Surgical History:   Procedure Laterality Date    COLPOSCOPY  29 y/o    with cryo    DILATION AND CURETTAGE      HX COLONOSCOPY      HX WRIST FRACTURE TX Right     WRIST REPAIR   No Known Allergies    Current Outpatient Medications:     citalopram (CELEXA) 20 mg tablet, Take 1 Tab by mouth daily. , Disp: 30 Tab, Rfl: 1    irbesartan (AVAPRO) 75 mg tablet, Take 1 Tab by mouth nightly., Disp: 30 Tab, Rfl: 2    Cetirizine (ZYRTEC) 10 mg cap, Take 10 mg by mouth as needed. , Disp: , Rfl:     ibuprofen 100 mg tablet, Take 100 mg by mouth every six (6) hours as needed for Pain., Disp: , Rfl:     cholecalciferol, vitamin D3, 2,000 unit tab, Take 2,000 Units by mouth daily. , Disp: , Rfl:     B.infantis-B.ani-B.long-B.bifi (PROBIOTIC 4X) 10-15 mg TbEC, Take 1 Tab by mouth., Disp: , Rfl:     CALCIUM CARBONATE/VITAMIN D3 (CALCIUM + D PO), Take 1 Tab by mouth daily. Total 770 mg daily calcium and 1000 units vitamin d , Disp: , Rfl:     MULTIVITAMIN PO, Take 1 Tab by mouth daily. , Disp: , Rfl:     Biotin 2,500 mcg cap, Take  by mouth., Disp: , Rfl:   Review of Systems   Constitutional: Negative. Respiratory: Negative. Cardiovascular: Negative. Gastrointestinal: Negative. Neurological: Positive for headaches. Psychiatric/Behavioral: Positive for depression. The patient is nervous/anxious. Physical Exam   Constitutional: No distress. HENT:   Mouth/Throat: Oropharynx is clear and moist.   Neck: Normal range of motion. Neck supple. Cardiovascular: Normal rate and regular rhythm. No murmur heard. Pulmonary/Chest: Effort normal and breath sounds normal.   Abdominal: Soft. Bowel sounds are normal.   Psychiatric:   Tearful    Nursing note and vitals reviewed. ASSESSMENT and PLAN  Diagnoses and all orders for this visit:    1. Stress  -     citalopram (CELEXA) 20 mg tablet; Take 1 Tab by mouth daily.  -     REFERRAL TO PSYCHIATRY    2. Episodic tension-type headache, not intractable  Take Excedrin tension headaches          Continue with massage   3.  Other insomnia  Follow up in 2-3 weeks  Pt was given an after visit summary which includes diagnosis, current medicines and vital and voiced understanding of treatment plan

## 2019-03-13 NOTE — LETTER
NOTIFICATION RETURN TO WORK / SCHOOL 
 
3/13/2019 12:52 PM 
 
Ms. Phylicia Verdugo [address] [place] [address] Drive [id number] To Whom It May Concern: 
 
Phylicia Verdugo is currently under the care of CHARMAINE Burch 53. She will return to work on 3/18/2019 If there are questions or concerns please have the patient contact our office. Sincerely, Jamal Reyes NP

## 2019-03-13 NOTE — PROGRESS NOTES
Chief Complaint   Patient presents with    Headache     For about 1 month, worse the last 5 days, unable to sleep well at all. 1. Have you been to the ER, urgent care clinic since your last visit? Hospitalized since your last visit? No    2. Have you seen or consulted any other health care providers outside of the [address] [place] [address] since your last visit? Include any pap smears or colon screening.  [facility] to [facility].

## 2019-03-18 DIAGNOSIS — G44.029 CHRONIC CLUSTER HEADACHE, NOT INTRACTABLE: ICD-10-CM

## 2019-03-18 DIAGNOSIS — F43.9 STRESS: Primary | ICD-10-CM

## 2019-03-20 ENCOUNTER — TELEPHONE (OUTPATIENT)
Dept: FAMILY MEDICINE CLINIC | Age: 59
End: 2019-03-20

## 2019-03-20 NOTE — TELEPHONE ENCOUNTER
Patient is calling stating that she is having some issues would like to speak with the nurse or doctor. Pt also states that she needs a doctors note to be out work of work.       Best callback:  [phone]      LOV:  Wednesday, March 13, 2019

## 2019-03-27 ENCOUNTER — OFFICE VISIT (OUTPATIENT)
Dept: NEUROLOGY | Age: 59
End: 2019-03-27

## 2019-03-27 VITALS
HEIGHT: 65 IN | SYSTOLIC BLOOD PRESSURE: 140 MMHG | DIASTOLIC BLOOD PRESSURE: 90 MMHG | BODY MASS INDEX: 20.33 KG/M2 | WEIGHT: 122 LBS

## 2019-03-27 DIAGNOSIS — G44.019 EPISODIC CLUSTER HEADACHE, NOT INTRACTABLE: Primary | ICD-10-CM

## 2019-03-27 RX ORDER — DEXAMETHASONE 2 MG/1
TABLET ORAL
Qty: 20 TAB | Refills: 0 | Status: SHIPPED | OUTPATIENT
Start: 2019-03-27 | End: 2019-04-26

## 2019-03-27 NOTE — PROGRESS NOTES
[facility] and [address] at [facility] at [address] [place] [address], [address] [facility] [address] [facility], [address] [place] [address]  [phone] Office  [phone] Facsimile           Referring: Liv Singh MD      Chief Complaint   Patient presents with    Headache     18-year-old left-handed woman who presents today for evaluation of headache. She notes that she has a history of headaches for a number of years that she is seen neurology for in the past.  She notes that she considers her headaches to be cluster type. She describes her headaches as occurring typically in a pattern once or twice a year where she will have recurrent headaches over a 1-2-week period of time and then they will subside completely. Her typical headache pattern is an ice pick type pain in the left orbital region with tearing of the eyes as well as runny nose. She says that she gets more relief with moving and gets a bit agitated and has to pace about and move about secondary to the pain. She would rather move then be still. She has associated photophobia phonophobia as well as nausea but she does not vomit. She is tried over-the-counter medications particularly Tylenol Excedrin etc.  These do not help. The headache she has now started on 8 February and has been persistent. It will become intense throughout the day and then back off and be less intense or go away and then return throughout the day. It is the exact same type of headache that she is had before its just never lasted this long before. No inciting factor for this. No injury. No fever. No chills. She did have an emergency department visit and I reviewed that note in the electronic medical record.   She had a head CT performed and I reviewed that scan personally and that was normal.  She has hypertension but notes it has been controlled and she is never had a history of uncontrolled blood pressure. She has never had any type of coronary artery disease. No MI. She has no chest pain with exertion or otherwise. No exertional shortness of breath or otherwise. Past Medical History:   Diagnosis Date    Abnormal Pap smear     h/o    Allergic rhinitis due to other allergen     Arrhythmia     PAC'S - CHAMOMILLE TEA CAUSES    Esophageal reflux     Herpes zoster     Hypertension     Mixed hyperlipidemia     Nausea & vomiting     Osteopenia     Other atopic dermatitis and related conditions     Shingles rash 10/2015    small spot on left shoulder    Unspecified spontaneous  without mention of complication     X2       Past Surgical History:   Procedure Laterality Date    COLPOSCOPY  29 y/o    with cryo    DILATION AND CURETTAGE      HX COLONOSCOPY      HX WRIST FRACTURE TX Right     WRIST REPAIR       Current Outpatient Medications   Medication Sig Dispense Refill    LYSINE PO Take  by mouth daily.  irbesartan (AVAPRO) 75 mg tablet Take 1 Tab by mouth nightly. 30 Tab 2    Cetirizine (ZYRTEC) 10 mg cap Take 10 mg by mouth as needed.  ibuprofen 100 mg tablet Take 100 mg by mouth every six (6) hours as needed for Pain.  Biotin 2,500 mcg cap Take  by mouth.  cholecalciferol, vitamin D3, 2,000 unit tab Take 2,000 Units by mouth daily.  B.infantis-B.ani-B.long-B.bifi (PROBIOTIC 4X) 10-15 mg TbEC Take 1 Tab by mouth.  CALCIUM CARBONATE/VITAMIN D3 (CALCIUM + D PO) Take 1 Tab by mouth daily. Total 770 mg daily calcium and 1000 units vitamin d       MULTIVITAMIN PO Take 1 Tab by mouth daily.  citalopram (CELEXA) 20 mg tablet Take 1 Tab by mouth daily. 30 Tab 1        No Known Allergies    Social History     Tobacco Use    Smoking status: Never Smoker    Smokeless tobacco: Never Used   Substance Use Topics    Alcohol use: No     Alcohol/week: 0.5 oz     Types: 1 Glasses of wine per week    Drug use:  No Family History   Problem Relation Age of Onset    Hypertension Mother     Heart Disease Mother         CHF    Cancer Mother         kidney    Heart Attack Father     Coronary Artery Disease Father         with pacemaker    Asthma Father     Heart Disease Father         pacemaker    Stroke Maternal Grandmother     Heart Attack Paternal Grandfather     Anesth Problems Neg Hx        Review of Systems  Pertinent positives and negatives as noted with remainder of comprehensive review negative    Examination  Visit Vitals  /90   Ht 5' 5\" (1.651 m)   Wt 55.3 kg (122 lb)   BMI 20.30 kg/m²     Pleasant, well appearing lady. Her dress and grooming are appropriate. .  No icterus. Oropharynx clear. Supple neck without bruit. Heart regular. Pulses are symmetrical.  No edema. Neurologically, she is awake, alert, and oriented with normal speech and language. Her cognition is normal.    Intact cranial nerves 2-12. No nystagmus. Visual fields full to confrontation. Disk margins are flat bilaterally. She has normal bulk and tone. She has no abnormal movement. She has no pronation or drift. She generates full strength in the upper and lower extremities to direct confrontational testing. Reflexes are symmetrical in the upper and lower extremities bilaterally. Her toes are down bilaterally. No Joseph. Finger nose finger and rapid alternating movements are normal.  Steady gait. No sensory deficit to primary modalities. .     Impression/Plan  68-year-old lady with headaches that do meet criteria for cluster and she seems to be in a cluster right now that has lasted longer than usual.  Interestingly she has never used any prescription medication to treat these. We discussed using injectable sumatriptan in the form of Zembrace 3 mg to mitigate side effects. We discussed the administration, potential side effects and potential benefits as well as alternatives.   I offered to have her be given an injection today in the office and she deferred at this point. She can use 1 Zembrace injection at headache onset and repeat in 1 hour if needed and she can use up to 4 doses i.e. 12 mg in 24 hours. To try to break this cluster we will give her a Decadron taper. Again discussed potential side effects. Track headaches. Follow-up in 4 weeks    Elodia Gutierrez MD    This note was created using voice recognition software. Despite editing, there may be syntax errors. This note will not be viewable in [address]

## 2019-03-27 NOTE — PROGRESS NOTES
New patient for headaches. Daily headaches for the past 6 weeks. Saw PCP for this and was referred to neurology. Did go to ED at [facility] weeks ago. CT Head was done then. Has tried ibuprofen, excedrin, aleve and vitamins. Nothing has helped. Accompanied by her sister,  [place].

## 2019-04-01 ENCOUNTER — TELEPHONE (OUTPATIENT)
Dept: NEUROLOGY | Age: 59
End: 2019-04-01

## 2019-04-01 DIAGNOSIS — I10 ESSENTIAL HYPERTENSION WITH GOAL BLOOD PRESSURE LESS THAN 140/90: ICD-10-CM

## 2019-04-01 RX ORDER — IRBESARTAN 75 MG/1
TABLET ORAL
Qty: 30 TAB | Refills: 2 | Status: SHIPPED | OUTPATIENT
Start: 2019-04-01 | End: 2019-06-30 | Stop reason: SDUPTHER

## 2019-04-01 NOTE — TELEPHONE ENCOUNTER
----- Message from Marcia Willard sent at 4/1/2019  2:57 PM EDT -----  Regarding: Dr Soto/ refill  The pt called to inform the doctor that the \"zembrace\" samples did not work and something else is needed.       Best contact number is [phone]

## 2019-04-01 NOTE — TELEPHONE ENCOUNTER
Pt has not filled Zembrace at pharmacy since it required PA and that had not been initiated yet. She used both samples which she said \"did nothing\"  She states she gets HA every night starting around 2230 and it will not go away until she gets exhausted and finally falls asleep.

## 2019-04-02 NOTE — TELEPHONE ENCOUNTER
[place] APPROVED for Zembrace 3mg/.5ml injectable. Effective dates unknown. ALAYNA Key [id number]. Case # [id number]. Please send Rx to pharmacy if necessary.

## 2019-04-02 NOTE — TELEPHONE ENCOUNTER
Pt notified of PA approval,still c/o HA and the 2 Zembrace samples did not help. Advised pt per Dr. Seb Adrian' verbal recommendation to p/u Zembrace from pharmacy and use until OV with Jose L to discuss further tx options for HA.   Pt agreed

## 2019-04-04 DIAGNOSIS — F43.9 STRESS: ICD-10-CM

## 2019-04-04 NOTE — TELEPHONE ENCOUNTER
Pharmacy requesting medication refill 90 day supply     Requested Prescriptions     Pending Prescriptions Disp Refills    citalopram (CELEXA) 20 mg tablet 30 Tab 1     Sig: Take 1 Tab by mouth daily.      Pharmacy on file verified  ([facility] [phone])

## 2019-04-05 RX ORDER — CITALOPRAM 20 MG/1
20 TABLET, FILM COATED ORAL DAILY
Qty: 90 TAB | Refills: 0 | Status: SHIPPED | OUTPATIENT
Start: 2019-04-05 | End: 2019-04-26

## 2019-04-10 ENCOUNTER — TELEPHONE (OUTPATIENT)
Dept: NEUROLOGY | Age: 59
End: 2019-04-10

## 2019-04-10 NOTE — TELEPHONE ENCOUNTER
Patient wants to talk with the nurse about the headaches she keeps having.  Patient stated that the medication isn't working

## 2019-04-11 NOTE — TELEPHONE ENCOUNTER
----- Message from Angelena Castleman sent at 4/11/2019  2:33 PM EDT -----  Regarding: ARNOL Villanueva/Telephone  Pt called regarding status of message left on yesterday regarding her headaches not any better and something she saw on her CT Scan that could be causing her headaches. Pt requested a call back today. Best contact number [phone].

## 2019-04-12 ENCOUNTER — TELEPHONE (OUTPATIENT)
Dept: NEUROLOGY | Age: 59
End: 2019-04-12

## 2019-04-12 NOTE — TELEPHONE ENCOUNTER
Pt states she is not able to sleep well d/t severe HA. She ended up with shingles d/t \"being worn down\" from lack of sleep, HA, and having to work. Pt is now taking acyclovir and feels that may be causing HARPER as well. Mike Hylton did not help at all so she stopped using. Increase HA may be d/t pollen as she is allergic. Pt asks if Dr. Thomas Cm would agree to write work note recommending cutting work hours back temporarily from 9 hr shifts to like 7-8 hr shifts. Pt notified that we have full schedule today and I will call her back later with answer.

## 2019-04-12 NOTE — TELEPHONE ENCOUNTER
----- Message from Kraftwurx Plate sent at 4/12/2019  8:39 AM EDT -----  Regarding: Dr. Dayday Kemp  Pt returned nurse call. Best contact number [phone].

## 2019-04-26 ENCOUNTER — OFFICE VISIT (OUTPATIENT)
Dept: NEUROLOGY | Age: 59
End: 2019-04-26

## 2019-04-26 ENCOUNTER — TELEPHONE (OUTPATIENT)
Dept: NEUROLOGY | Age: 59
End: 2019-04-26

## 2019-04-26 VITALS
HEIGHT: 65 IN | OXYGEN SATURATION: 98 % | HEART RATE: 80 BPM | WEIGHT: 122 LBS | DIASTOLIC BLOOD PRESSURE: 84 MMHG | SYSTOLIC BLOOD PRESSURE: 118 MMHG | BODY MASS INDEX: 20.33 KG/M2

## 2019-04-26 DIAGNOSIS — G44.021 INTRACTABLE CHRONIC CLUSTER HEADACHE: ICD-10-CM

## 2019-04-26 DIAGNOSIS — G43.919 INTRACTABLE MIGRAINE WITHOUT STATUS MIGRAINOSUS, UNSPECIFIED MIGRAINE TYPE: Primary | ICD-10-CM

## 2019-04-26 RX ORDER — ACETAMINOPHEN 500 MG
TABLET ORAL
COMMUNITY
End: 2020-03-24

## 2019-04-26 RX ORDER — DIHYDROERGOTAMINE MESYLATE 4 MG/ML
SPRAY NASAL
Qty: 2 CONTAINER | Refills: 2 | Status: SHIPPED | OUTPATIENT
Start: 2019-04-26 | End: 2019-05-10

## 2019-04-26 RX ORDER — ZOLMITRIPTAN 5 MG/1
TABLET, ORALLY DISINTEGRATING ORAL
Qty: 6 TAB | Refills: 2 | Status: SHIPPED | OUTPATIENT
Start: 2019-04-26 | End: 2019-11-26

## 2019-04-26 RX ORDER — TOPIRAMATE 100 MG/1
TABLET, FILM COATED ORAL
Qty: 30 TAB | Refills: 5 | Status: SHIPPED | OUTPATIENT
Start: 2019-04-26 | End: 2019-05-10

## 2019-04-26 NOTE — PATIENT INSTRUCTIONS
Inject 70 mg Aimovig autoinjector subQ TODAY and then every 30 days. Start Topamax 1/2 to 1 tablet by mouth nightly. For acute rescue can try Zomig as HA comes on      For the next 3 days use Migranal nasal spray as follows:    1 spray in both nostrils. Repeat in 15 minutes. Do this twice a day X 3 days.        Call if no changes by Monday

## 2019-04-26 NOTE — PROGRESS NOTES
Rodney Flores is a [address] [facility] female who presents with the following  Chief Complaint   Patient presents with    Headache       HPI       Patient comes back in for a follow up for cluster migraine, headaches, migraines. She has been having daily migraines mostly at night lasting hours. She is unable to sleep and can not function well at work. She states her headaches come on strong. Tired Zembrace and this did not help. She has tried this multiple times without relief. She has spells like this multiple times a year. CT was normal.   She has tried OTC medications without relief. She has not tried Zomig, Migranal, Aimovig. She states the pain is a 10/10. In the entire head. She has light sensitivity, dizziness, and nausea with this. She has had to leave work multiple times due to this. She is on BP medication. Tried Celexa also. No Known Allergies    Current Outpatient Medications   Medication Sig    OTHER     acetaminophen (TYLENOL EXTRA STRENGTH) 500 mg tablet Take  by mouth every six (6) hours as needed for Pain.  aspirin-acetaminophen-caffeine (EXCEDRIN ES) 250-250-65 mg per tablet Take 1 Tab by mouth.  erenumab-aooe (AIMOVIG AUTOINJECTOR) 70 mg/mL atIn 1 Syringe by SubCUTAneous route every thirty (30) days.  dihydroergotamine (MIGRANAL) 0.5 mg/pump act. (4 mg/mL) nasal spray 1 spray in both nostrils at HA onset. Can repeat again in 2 hours X 1 dose. Max 2 doses in 24 hours    ZOLMitriptan (ZOMIG ZMT) 5 mg disintegrating tablet 1 tab at onset of migraine; take 1 tab in 2 hours if headache remains; Limit: 2 tabs in 24 hours, not more than 3 days a week. 30 Day Rx    OTHER Rx Portable Oxygen Tank. Dx: Cluster Headache 346.2. Instructions: For cluster headache not responding to medications, turn on to 12 Liters per minute and inhale oxygen via mask for 15 minutes. Dispense one cannister, refill x 3.    topiramate (TOPAMAX) 100 mg tablet Take 1 tablet by mouth nightly.  irbesartan (AVAPRO) 75 mg tablet TAKE 1 TABLET BY MOUTH EVERY DAY AT NIGHT    LYSINE PO Take  by mouth daily.  Cetirizine (ZYRTEC) 10 mg cap Take 10 mg by mouth as needed.  cholecalciferol, vitamin D3, 2,000 unit tab Take 2,000 Units by mouth daily.  B.infantis-B.ani-B.long-B.bifi (PROBIOTIC 4X) 10-15 mg TbEC Take 1 Tab by mouth.  CALCIUM CARBONATE/VITAMIN D3 (CALCIUM + D PO) Take 1 Tab by mouth daily. Total 770 mg daily calcium and 1000 units vitamin d     MULTIVITAMIN PO Take 1 Tab by mouth daily. No current facility-administered medications for this visit.         Social History     Tobacco Use   Smoking Status Never Smoker   Smokeless Tobacco Never Used       Past Medical History:   Diagnosis Date    Abnormal Pap smear     h/o    Allergic rhinitis due to other allergen     Arrhythmia     PAC'S - CHAMOMILLE TEA CAUSES    Esophageal reflux     Herpes zoster     Hypertension     Mixed hyperlipidemia     Nausea & vomiting     Osteopenia     Other atopic dermatitis and related conditions     Shingles rash 10/2015    small spot on left shoulder    Unspecified spontaneous  without mention of complication     X2       Past Surgical History:   Procedure Laterality Date    COLPOSCOPY  29 y/o    with cryo    DILATION AND CURETTAGE      HX COLONOSCOPY      HX WRIST FRACTURE TX Right     WRIST REPAIR       Family History   Problem Relation Age of Onset    Hypertension Mother     Heart Disease Mother         CHF    Cancer Mother         kidney    Heart Attack Father     Coronary Artery Disease Father         with pacemaker    Asthma Father     Heart Disease Father         pacemaker    Stroke Maternal Grandmother     Heart Attack Paternal Grandfather     Anesth Problems Neg Hx        Social History     Socioeconomic History    Marital status:      Spouse name: Sourav Gallegos Number of children: 2    Years of education: Not on file    Highest education level: Not on file   Occupational History    Occupation: Nurse     Employer: [id number]   Tobacco Use    Smoking status: Never Smoker    Smokeless tobacco: Never Used   Substance and Sexual Activity    Alcohol use: No     Alcohol/week: 0.5 oz     Types: 1 Glasses of wine per week    Drug use: No    Sexual activity: Yes     Partners: Male       Review of Systems   Eyes: Positive for blurred vision and photophobia. Negative for double vision. Respiratory: Negative for shortness of breath and wheezing. Cardiovascular: Negative for chest pain and palpitations. Gastrointestinal: Positive for nausea. Negative for vomiting. Neurological: Positive for headaches. Negative for dizziness, tingling, tremors, seizures and loss of consciousness. Remainder of comprehensive review is negative. Physical Exam :    Visit Vitals  /84   Pulse 80   Ht 5' 5\" (1.651 m)   Wt 55.3 kg (122 lb)   SpO2 98%   BMI 20.30 kg/m²       General: Well defined, nourished, and groomed individual in no acute distress.    Neck: Supple, nontender, no bruits, no pain with resistance to active range of motion.    Heart: Regular rate and rhythm, no murmurs, rub, or gallop. Normal S1S2. Lungs: Clear to auscultation bilaterally with equal chest expansion, no cough, no wheeze  Musculoskeletal: Extremities revealed no edema and had full range of motion of joints.    Psych: Good mood and bright affect    NEUROLOGICAL EXAMINATION:    Mental Status: Alert and oriented to person, place, and time    Cranial Nerves:    II, III, IV, VI: Visual acuity grossly intact. Visual fields are normal.    Pupils are equal, round, and reactive to light and accommodation.    Extra-ocular movements are full and fluid. Fundoscopic exam was benign, no ptosis or nystagmus.    V-XII: Hearing is grossly intact. Facial features are symmetric, with normal sensation and strength. The palate rises symmetrically and the tongue protrudes midline. Sternocleidomastoids 5/5. Motor Examination: Normal tone, bulk, and strength, 5/5 muscle strength throughout. Coordination: Finger to nose was normal. No resting or intention tremor    Gait and Station: Steady while walking. Normal arm swing. No pronator drift. No muscle wasting or fasiculations noted. Reflexes: DTRs 2+ throughout. Results for orders placed or performed during the hospital encounter of 19   SAMPLES BEING HELD   Result Value Ref Range    SAMPLES BEING HELD 1RED,1LAV,1PST,1BLUE     COMMENT        Add-on orders for these samples will be processed based on acceptable specimen integrity and analyte stability, which may vary by analyte. Orders Placed This Encounter    OTHER    acetaminophen (TYLENOL EXTRA STRENGTH) 500 mg tablet     Sig: Take  by mouth every six (6) hours as needed for Pain.  aspirin-acetaminophen-caffeine (EXCEDRIN ES) 250-250-65 mg per tablet     Sig: Take 1 Tab by mouth.  erenumab-aooe (AIMOVIG AUTOINJECTOR) 70 mg/mL atIn     Si Syringe by SubCUTAneous route every thirty (30) days. Dispense:  1 Syringe     Refill:  5    dihydroergotamine (MIGRANAL) 0.5 mg/pump act. (4 mg/mL) nasal spray     Si spray in both nostrils at HA onset. Can repeat again in 2 hours X 1 dose. Max 2 doses in 24 hours     Dispense:  2 Container     Refill:  2    ZOLMitriptan (ZOMIG ZMT) 5 mg disintegrating tablet     Si tab at onset of migraine; take 1 tab in 2 hours if headache remains; Limit: 2 tabs in 24 hours, not more than 3 days a week. 30 Day Rx     Dispense:  6 Tab     Refill:  2    OTHER     Sig: Rx Portable Oxygen Tank. Dx: Cluster Headache 346.2. Instructions: For cluster headache not responding to medications, turn on to 12 Liters per minute and inhale oxygen via mask for 15 minutes. Dispense one cannister, refill x 3.      Dispense:  1 Container     Refill:  3    topiramate (TOPAMAX) 100 mg tablet     Sig: Take 1 tablet by mouth nightly. Dispense:  30 Tab     Refill:  5       1. Intractable migraine without status migrainosus, unspecified migraine type    2. Intractable chronic cluster headache      Intractable migraine,  Cluster headaches. Initiate Aimovig 70 mg every 30 days to help prevention of migraine. We discussed this and she has no questions. Hopefully this can help break her cycle of intractable headache. Try Migranal for rescue. And we discussed this  Use Zomig MLT for acute rescue also. And we discussed this. Try Topamax up to 100 mg nightly for sleep, pain management of headaches, cluster. Can also try oxygen to help with cluster aspect of her headaches. Call with any changes.                This note will not be viewable in [employer]

## 2019-04-26 NOTE — TELEPHONE ENCOUNTER
----- Message from Bonifacio Ojeda sent at 4/26/2019 11:24 AM EDT -----  Regarding: /Ava Teague from [address] [facility] is requesting a call back in reference to drug interaction with the pt. Best contact number is [phone].

## 2019-04-26 NOTE — LETTER
4/26/2019 9:17 AM 
 
Ms. Laurent Tatum [address] [place] [address] Drive [id number] To Whom It May Concern,  
 
Mrs. Laurent Tatum is currently under the care of [facility]. Due to her current chronic medical condition, she should be excused from work when symptoms are present. She should also be able to work part time shifts of  4 hours or longer depending on her current symptoms If you have any further questions please contact our office. Sincerely, Jose L Herr

## 2019-04-26 NOTE — PROGRESS NOTES
Patient has  cluster headaches and she doesn't get any sleep with those. Maybe 2 hours per night. She did try zembrace but it did not help. Was told that CT results were normal, but she does have questions. Works 40 hours per week at [employer] but having trouble with the not sleeping more than 2 hours. This has been going on since February. Start every night at 11pm. Several headaches. Finally ends at [address] by .

## 2019-04-30 ENCOUNTER — TELEPHONE (OUTPATIENT)
Dept: NEUROLOGY | Age: 59
End: 2019-04-30

## 2019-04-30 DIAGNOSIS — G44.021 INTRACTABLE CHRONIC CLUSTER HEADACHE: Primary | ICD-10-CM

## 2019-04-30 RX ORDER — OXYCODONE AND ACETAMINOPHEN 5; 325 MG/1; MG/1
1 TABLET ORAL
Qty: 30 TAB | Refills: 0 | Status: SHIPPED | OUTPATIENT
Start: 2019-04-30 | End: 2019-05-10

## 2019-04-30 RX ORDER — PREDNISONE 10 MG/1
TABLET ORAL
Qty: 42 TAB | Refills: 0 | Status: SHIPPED | OUTPATIENT
Start: 2019-04-30 | End: 2019-05-24

## 2019-05-10 ENCOUNTER — TELEPHONE (OUTPATIENT)
Dept: NEUROLOGY | Age: 59
End: 2019-05-10

## 2019-05-10 ENCOUNTER — OFFICE VISIT (OUTPATIENT)
Dept: NEUROLOGY | Age: 59
End: 2019-05-10

## 2019-05-10 VITALS
HEIGHT: 65 IN | DIASTOLIC BLOOD PRESSURE: 76 MMHG | OXYGEN SATURATION: 99 % | HEART RATE: 67 BPM | BODY MASS INDEX: 20.33 KG/M2 | WEIGHT: 122 LBS | RESPIRATION RATE: 20 BRPM | SYSTOLIC BLOOD PRESSURE: 120 MMHG

## 2019-05-10 DIAGNOSIS — G44.009 MIGRAINE-CLUSTER HEADACHE SYNDROME: Primary | ICD-10-CM

## 2019-05-10 RX ORDER — VERAPAMIL HYDROCHLORIDE 120 MG/1
120 CAPSULE, EXTENDED RELEASE ORAL DAILY
Qty: 30 CAP | Refills: 5 | Status: SHIPPED | OUTPATIENT
Start: 2019-05-10 | End: 2019-05-24

## 2019-05-10 RX ORDER — AMITRIPTYLINE HYDROCHLORIDE 25 MG/1
25 TABLET, FILM COATED ORAL
Qty: 30 TAB | Refills: 5 | Status: SHIPPED | OUTPATIENT
Start: 2019-05-10 | End: 2019-05-24

## 2019-05-10 RX ORDER — BUTALBITAL, ACETAMINOPHEN AND CAFFEINE 50; 325; 40 MG/1; MG/1; MG/1
TABLET ORAL
Qty: 30 TAB | Refills: 5 | Status: SHIPPED | OUTPATIENT
Start: 2019-05-10 | End: 2019-05-24

## 2019-05-10 NOTE — PROGRESS NOTES
Mauricio Holland is a 59 y.o. female who presents with the following  Chief Complaint   Patient presents with    Migraine     Follow Up        HPI        Patient comes back in for a follow up for cluster migraine, headaches, migraines. She has been having daily migraines mostly at night lasting hours. She is unable to sleep and can not function well at work. Aimovig hasn't helped yet. Tried Topamax made her feel bad.   zomig tablets, percocet, zembrace have not really helped. She has tried 02 and this gives her some relief at night. She states her headaches come on strong. Tired Zembrace and this did not help. She has tried this multiple times without relief. She has spells like this multiple times a year. CT was normal.   She has tried OTC medications without relief.      She has not tried Zomig, Migranal, Aimovig. She states the pain is a 10/10. In the entire head. She has light sensitivity, dizziness, and nausea with this. She has had to leave work multiple times due to this. She is on BP medication. Tried Celexa also. Never tried Verapamil. RADHA  On prednisone right now. DHE spray did not help  Decadron did not help. No Known Allergies    Current Outpatient Medications   Medication Sig    amitriptyline (ELAVIL) 25 mg tablet Take 1 Tab by mouth nightly.  verapamil ER (VERELAN) 120 mg ER capsule Take 1 Cap by mouth daily.  butalbital-acetaminophen-caffeine (FIORICET, ESGIC) -40 mg per tablet Take 1-2 tablets by mouth every 6 hours hours PRN    predniSONE (DELTASONE) 10 mg tablet 6 po x2 days, 5 po x 2days, 4 po x 2days, 3 po x2days, 2 po x2days, 1 po x 2days    OTHER     acetaminophen (TYLENOL EXTRA STRENGTH) 500 mg tablet Take  by mouth every six (6) hours as needed for Pain.  aspirin-acetaminophen-caffeine (EXCEDRIN ES) 250-250-65 mg per tablet Take 1 Tab by mouth.     erenumab-aooe (AIMOVIG AUTOINJECTOR) 70 mg/mL atIn 1 Syringe by SubCUTAneous route every thirty (30) days.  ZOLMitriptan (ZOMIG ZMT) 5 mg disintegrating tablet 1 tab at onset of migraine; take 1 tab in 2 hours if headache remains; Limit: 2 tabs in 24 hours, not more than 3 days a week. 30 Day Rx    OTHER Rx Portable Oxygen Tank. Dx: Cluster Headache 346.2. Instructions: For cluster headache not responding to medications, turn on to 12 Liters per minute and inhale oxygen via mask for 15 minutes. Dispense one cannister, refill x 3.    irbesartan (AVAPRO) 75 mg tablet TAKE 1 TABLET BY MOUTH EVERY DAY AT NIGHT    LYSINE PO Take  by mouth daily.  Cetirizine (ZYRTEC) 10 mg cap Take 10 mg by mouth as needed.  cholecalciferol, vitamin D3, 2,000 unit tab Take 2,000 Units by mouth daily.  B.infantis-B.ani-B.long-B.bifi (PROBIOTIC 4X) 10-15 mg TbEC Take 1 Tab by mouth.  CALCIUM CARBONATE/VITAMIN D3 (CALCIUM + D PO) Take 1 Tab by mouth daily. Total 770 mg daily calcium and 1000 units vitamin d     MULTIVITAMIN PO Take 1 Tab by mouth daily. No current facility-administered medications for this visit.         Social History     Tobacco Use   Smoking Status Never Smoker   Smokeless Tobacco Never Used       Past Medical History:   Diagnosis Date    Abnormal Pap smear     h/o    Allergic rhinitis due to other allergen     Arrhythmia     PAC'S - CHAMOMILLE TEA CAUSES    Esophageal reflux     Herpes zoster     Hypertension     Mixed hyperlipidemia     Nausea & vomiting     Osteopenia     Other atopic dermatitis and related conditions     Shingles rash 10/2015    small spot on left shoulder    Unspecified spontaneous  without mention of complication     X2       Past Surgical History:   Procedure Laterality Date    COLPOSCOPY  29 y/o    with cryo    DILATION AND CURETTAGE      HX COLONOSCOPY      HX WRIST FRACTURE TX Right     WRIST REPAIR       Family History   Problem Relation Age of Onset    Hypertension Mother     Heart Disease Mother         CHF    Cancer Mother kidney    Heart Attack Father     Coronary Artery Disease Father         with pacemaker    Asthma Father     Heart Disease Father         pacemaker    Stroke Maternal Grandmother     Heart Attack Paternal Grandfather     Anesth Problems Neg Hx        Social History     Socioeconomic History    Marital status:      Spouse name: Clint Lloyd Number of children: 2    Years of education: Not on file    Highest education level: Not on file   Occupational History    Occupation: Nurse     Employer: [id number]   Tobacco Use    Smoking status: Never Smoker    Smokeless tobacco: Never Used   Substance and Sexual Activity    Alcohol use: No     Alcohol/week: 0.5 oz     Types: 1 Glasses of wine per week    Drug use: No    Sexual activity: Yes     Partners: Male       Review of Systems   Eyes: Positive for blurred vision, double vision and photophobia. Gastrointestinal: Negative for nausea and vomiting. Neurological: Positive for headaches. Negative for dizziness, tingling and tremors. Psychiatric/Behavioral: The patient has insomnia. Remainder of comprehensive review is negative. Physical Exam :    Visit Vitals  /76 (BP 1 Location: Left arm, BP Patient Position: Sitting)   Pulse 67   Resp 20   Ht 5' 5\" (1.651 m)   Wt 55.3 kg (122 lb)   SpO2 99%   BMI 20.30 kg/m²       General: Well defined, nourished, and groomed individual in no acute distress.    Neck: Supple, nontender, no bruits, no pain with resistance to active range of motion.    Heart: Regular rate and rhythm, no murmurs, rub, or gallop. Normal S1S2.   Lungs: Clear to auscultation bilaterally with equal chest expansion, no cough, no wheeze  Musculoskeletal: Extremities revealed no edema and had full range of motion of joints.    Psych: Good mood and bright affect    NEUROLOGICAL EXAMINATION:    Mental Status: Alert and oriented to person, place, and time    Cranial Nerves:    II, III, IV, VI: Visual acuity grossly intact. Visual fields are normal.    Pupils are equal, round, and reactive to light and accommodation.    Extra-ocular movements are full and fluid. Fundoscopic exam was benign, no ptosis or nystagmus.    V-XII: Hearing is grossly intact. Facial features are symmetric, with normal sensation and strength. The palate rises symmetrically and the tongue protrudes midline. Sternocleidomastoids 5/5. Motor Examination: Normal tone, bulk, and strength, 5/5 muscle strength throughout. Coordination: Finger to nose was normal. No resting or intention tremor    Gait and Station: Steady while walking. Normal arm swing. No pronator drift. No muscle wasting or fasiculations noted. Reflexes: DTRs 2+ throughout. Results for orders placed or performed during the hospital encounter of 03/01/19   SAMPLES BEING HELD   Result Value Ref Range    SAMPLES BEING HELD 1RED,1LAV,1PST,1BLUE     COMMENT        Add-on orders for these samples will be processed based on acceptable specimen integrity and analyte stability, which may vary by analyte. Orders Placed This Encounter    REFERRAL TO INFUSION THERAPY     Referral Priority:   Routine     Referral Type:   Consultation     Referral Reason:   Specialty Services Required     Number of Visits Requested:   1    amitriptyline (ELAVIL) 25 mg tablet     Sig: Take 1 Tab by mouth nightly. Dispense:  30 Tab     Refill:  5    verapamil ER (VERELAN) 120 mg ER capsule     Sig: Take 1 Cap by mouth daily. Dispense:  30 Cap     Refill:  5    butalbital-acetaminophen-caffeine (FIORICET, ESGIC) -40 mg per tablet     Sig: Take 1-2 tablets by mouth every 6 hours hours PRN     Dispense:  30 Tab     Refill:  5       1. Migraine-cluster headache syndrome        migraines and cluster. Initiate Verapamil 120 mg nightly ER to help. Continue to monitor BP daily. Increase water intake.    Try Elavil 25 mg nightly to help pain and sleep as she has not gotten more then 3 hours of sleep a night since February and still having trouble with getting to work and functioning. Keep all rescue medications for now. Try Fioricet for PRN rescue. Keep o2. Keep Dwayne Grater for prevention. Keep and finish prednisone. Refer to [facility] to help as a 3 day infusion to break cycle. Can consider Lithium if needed.              This note will not be viewable in [employer]

## 2019-05-10 NOTE — PROGRESS NOTES
Patient is here for a follow up for cluster migraines,    She states that she has been feeling tired, not sleeping very often, due to the migraines at night. She is having the migraines every night.

## 2019-05-10 NOTE — TELEPHONE ENCOUNTER
Matt Denson from [address] [place] [address]   Stated that the medication Er 120 and nasel spray   stated that they shouldn't be use together  Would like a callback to discuss this issue

## 2019-05-24 ENCOUNTER — TELEPHONE (OUTPATIENT)
Dept: NEUROLOGY | Age: 59
End: 2019-05-24

## 2019-05-24 ENCOUNTER — OFFICE VISIT (OUTPATIENT)
Dept: NEUROLOGY | Age: 59
End: 2019-05-24

## 2019-05-24 VITALS
SYSTOLIC BLOOD PRESSURE: 118 MMHG | WEIGHT: 122 LBS | RESPIRATION RATE: 20 BRPM | OXYGEN SATURATION: 98 % | HEART RATE: 88 BPM | HEIGHT: 65 IN | DIASTOLIC BLOOD PRESSURE: 82 MMHG | BODY MASS INDEX: 20.33 KG/M2

## 2019-05-24 DIAGNOSIS — G44.009 MIGRAINE-CLUSTER HEADACHE SYNDROME: Primary | ICD-10-CM

## 2019-05-24 NOTE — TELEPHONE ENCOUNTER
PA APPROVED for Depacon/Valproic ACid. Effective dates 06/03/19 - 06/05/19 w/ 3 visits. Case # [id number]. Approval scanned into media.

## 2019-05-24 NOTE — PROGRESS NOTES
Khari Kramer is a 59 y.o. female who presents with the following  Chief Complaint   Patient presents with    Headache     2wk Follow Up        HPI   Ignata helped completely decrease cluster headaches. Doing well. No concerns at this time. As a recap, Patient comes back in for a follow up for cluster migraine, headaches, migraines. She has been having daily migraines mostly at night lasting hours. She is unable to sleep and can not function well at work. Aimovig hasn't helped yet. Tried Topamax made her feel bad.   zomig tablets, percocet, zembrace have not really helped. She has tried 02 and this gives her some relief at night. She states her headaches come on strong. Tired Zembrace and this did not help. She has tried this multiple times without relief. She has spells like this multiple times a year. CT was normal.   She has tried OTC medications without relief.      She has not tried Zomig, Migranal, Aimovig. She states the pain is a 10/10. In the entire head. She has light sensitivity, dizziness, and nausea with this. She has had to leave work multiple times due to this. She is on BP medication. Tried Celexa also.   Never tried Verapamil. RADHA  On prednisone right now. DHE spray did not help  Decadron did not help. No Known Allergies    Current Outpatient Medications   Medication Sig    OTHER     acetaminophen (TYLENOL EXTRA STRENGTH) 500 mg tablet Take  by mouth every six (6) hours as needed for Pain.  aspirin-acetaminophen-caffeine (EXCEDRIN ES) 250-250-65 mg per tablet Take 1 Tab by mouth.  ZOLMitriptan (ZOMIG ZMT) 5 mg disintegrating tablet 1 tab at onset of migraine; take 1 tab in 2 hours if headache remains; Limit: 2 tabs in 24 hours, not more than 3 days a week. 30 Day Rx    OTHER Rx Portable Oxygen Tank. Dx: Cluster Headache 346.2. Instructions:  For cluster headache not responding to medications, turn on to 12 Liters per minute and inhale oxygen via mask for 15 minutes. Dispense one cannister, refill x 3.    irbesartan (AVAPRO) 75 mg tablet TAKE 1 TABLET BY MOUTH EVERY DAY AT NIGHT    LYSINE PO Take  by mouth daily.  Cetirizine (ZYRTEC) 10 mg cap Take 10 mg by mouth as needed.  cholecalciferol, vitamin D3, 2,000 unit tab Take 2,000 Units by mouth daily.  B.infantis-B.ani-B.long-B.bifi (PROBIOTIC 4X) 10-15 mg TbEC Take 1 Tab by mouth.  CALCIUM CARBONATE/VITAMIN D3 (CALCIUM + D PO) Take 1 Tab by mouth daily. Total 770 mg daily calcium and 1000 units vitamin d     MULTIVITAMIN PO Take 1 Tab by mouth daily. No current facility-administered medications for this visit.         Social History     Tobacco Use   Smoking Status Never Smoker   Smokeless Tobacco Never Used       Past Medical History:   Diagnosis Date    Abnormal Pap smear     h/o    Allergic rhinitis due to other allergen     Arrhythmia     PAC'S - CHAMOMILLE TEA CAUSES    Esophageal reflux     Herpes zoster     Hypertension     Mixed hyperlipidemia     Nausea & vomiting     Osteopenia     Other atopic dermatitis and related conditions     Shingles rash 10/2015    small spot on left shoulder    Unspecified spontaneous  without mention of complication     X2       Past Surgical History:   Procedure Laterality Date    COLPOSCOPY  31 y/o    with cryo    DILATION AND CURETTAGE      HX COLONOSCOPY      HX WRIST FRACTURE TX Right     WRIST REPAIR       Family History   Problem Relation Age of Onset    Hypertension Mother     Heart Disease Mother         CHF    Cancer Mother         kidney    Heart Attack Father     Coronary Artery Disease Father         with pacemaker    Asthma Father     Heart Disease Father         pacemaker    Stroke Maternal Grandmother     Heart Attack Paternal Grandfather     Anesth Problems Neg Hx        Social History     Socioeconomic History    Marital status:      Spouse name: Usha Pereyra Number of children: 2    Years of education: Not on file    Highest education level: Not on file   Occupational History    Occupation: Nurse     Employer: [id number]   Tobacco Use    Smoking status: Never Smoker    Smokeless tobacco: Never Used   Substance and Sexual Activity    Alcohol use: No     Alcohol/week: 0.5 oz     Types: 1 Glasses of wine per week    Drug use: No    Sexual activity: Yes     Partners: Male       Review of Systems   Eyes: Negative for blurred vision, double vision and photophobia. Gastrointestinal: Negative for nausea and vomiting. Neurological: Positive for headaches. Negative for dizziness and tingling. Remainder of comprehensive review is negative. Physical Exam :    Visit Vitals  /82 (BP 1 Location: Left arm, BP Patient Position: Sitting)   Pulse 88   Resp 20   Ht 5' 5\" (1.651 m)   Wt 55.3 kg (122 lb)   SpO2 98%   BMI 20.30 kg/m²       General: Well defined, nourished, and groomed individual in no acute distress.    Neck: Supple, nontender, no bruits, no pain with resistance to active range of motion.    Heart: Regular rate and rhythm, no murmurs, rub, or gallop. Normal S1S2. Lungs: Clear to auscultation bilaterally with equal chest expansion, no cough, no wheeze  Musculoskeletal: Extremities revealed no edema and had full range of motion of joints.    Psych: Good mood and bright affect    NEUROLOGICAL EXAMINATION:    Mental Status: Alert and oriented to person, place, and time    Cranial Nerves:    II, III, IV, VI: Visual acuity grossly intact. Visual fields are normal.    Pupils are equal, round, and reactive to light and accommodation.    Extra-ocular movements are full and fluid. Fundoscopic exam was benign, no ptosis or nystagmus.    V-XII: Hearing is grossly intact. Facial features are symmetric, with normal sensation and strength. The palate rises symmetrically and the tongue protrudes midline. Sternocleidomastoids 5/5.      Motor Examination: Normal tone, bulk, and strength, 5/5 muscle strength throughout. Coordination: Finger to nose was normal. No resting or intention tremor    Gait and Station: Steady while walking. Normal arm swing. No pronator drift. No muscle wasting or fasiculations noted. Reflexes: DTRs 2+ throughout. Results for orders placed or performed during the hospital encounter of 03/01/19   SAMPLES BEING HELD   Result Value Ref Range    SAMPLES BEING HELD 1RED,1LAV,1PST,1BLUE     COMMENT        Add-on orders for these samples will be processed based on acceptable specimen integrity and analyte stability, which may vary by analyte. No orders of the defined types were placed in this encounter. No diagnosis found. Doing well. Cluster has broken. Keep track of symptoms. FMLA filled out.          This note will not be viewable in [employer]

## 2019-05-24 NOTE — PROGRESS NOTES
Patient is here for a 2wk follow up for cluster headaches,     She states that she has been feeling okay, feeling a little groggy today. Has not had any cluster headaches, but she has had the normal headaches. No specific concerns at this time.

## 2019-05-31 RX ORDER — DIPHENHYDRAMINE HYDROCHLORIDE 50 MG/ML
25 INJECTION, SOLUTION INTRAMUSCULAR; INTRAVENOUS ONCE
Status: ACTIVE | OUTPATIENT
Start: 2019-06-04 | End: 2019-06-04

## 2019-05-31 RX ORDER — METOCLOPRAMIDE HYDROCHLORIDE 5 MG/ML
10 INJECTION INTRAMUSCULAR; INTRAVENOUS ONCE
Status: ACTIVE | OUTPATIENT
Start: 2019-06-05 | End: 2019-06-05

## 2019-05-31 RX ORDER — DEXAMETHASONE SODIUM PHOSPHATE 4 MG/ML
10 INJECTION, SOLUTION INTRA-ARTICULAR; INTRALESIONAL; INTRAMUSCULAR; INTRAVENOUS; SOFT TISSUE ONCE
Status: ACTIVE | OUTPATIENT
Start: 2019-06-05 | End: 2019-06-05

## 2019-05-31 RX ORDER — DIPHENHYDRAMINE HYDROCHLORIDE 50 MG/ML
25 INJECTION, SOLUTION INTRAMUSCULAR; INTRAVENOUS ONCE
Status: ACTIVE | OUTPATIENT
Start: 2019-06-05 | End: 2019-06-05

## 2019-05-31 RX ORDER — METOCLOPRAMIDE HYDROCHLORIDE 5 MG/ML
10 INJECTION INTRAMUSCULAR; INTRAVENOUS ONCE
Status: ACTIVE | OUTPATIENT
Start: 2019-06-03 | End: 2019-06-03

## 2019-05-31 RX ORDER — DEXAMETHASONE SODIUM PHOSPHATE 4 MG/ML
10 INJECTION, SOLUTION INTRA-ARTICULAR; INTRALESIONAL; INTRAMUSCULAR; INTRAVENOUS; SOFT TISSUE ONCE
Status: ACTIVE | OUTPATIENT
Start: 2019-06-03 | End: 2019-06-03

## 2019-05-31 RX ORDER — DIPHENHYDRAMINE HYDROCHLORIDE 50 MG/ML
25 INJECTION, SOLUTION INTRAMUSCULAR; INTRAVENOUS ONCE
Status: ACTIVE | OUTPATIENT
Start: 2019-06-03 | End: 2019-06-03

## 2019-05-31 RX ORDER — METOCLOPRAMIDE HYDROCHLORIDE 5 MG/ML
10 INJECTION INTRAMUSCULAR; INTRAVENOUS ONCE
Status: ACTIVE | OUTPATIENT
Start: 2019-06-04 | End: 2019-06-04

## 2019-05-31 RX ORDER — DEXAMETHASONE SODIUM PHOSPHATE 4 MG/ML
10 INJECTION, SOLUTION INTRA-ARTICULAR; INTRALESIONAL; INTRAMUSCULAR; INTRAVENOUS; SOFT TISSUE ONCE
Status: ACTIVE | OUTPATIENT
Start: 2019-06-04 | End: 2019-06-04

## 2019-06-03 ENCOUNTER — HOSPITAL ENCOUNTER (OUTPATIENT)
Dept: INFUSION THERAPY | Age: 59
Discharge: HOME | End: 2019-06-03

## 2019-06-03 RX ORDER — SODIUM CHLORIDE 9 MG/ML
25 INJECTION, SOLUTION INTRAVENOUS CONTINUOUS
Status: CANCELLED | OUTPATIENT
Start: 2019-06-03 | End: 2019-06-04

## 2019-06-03 RX ORDER — SODIUM CHLORIDE 0.9 % (FLUSH) 0.9 %
5-10 SYRINGE (ML) INJECTION AS NEEDED
Status: CANCELLED | OUTPATIENT
Start: 2019-06-03 | End: 2019-06-04

## 2019-06-03 NOTE — PROGRESS NOTES
Pt contacted [facility] and advised she would not be coming to any of her scheduled [facility] appointments. Appointments cancelled.

## 2019-06-04 ENCOUNTER — HOSPITAL ENCOUNTER (OUTPATIENT)
Dept: INFUSION THERAPY | Age: 59
Discharge: HOME | End: 2019-06-04

## 2019-06-05 ENCOUNTER — HOSPITAL ENCOUNTER (OUTPATIENT)
Dept: INFUSION THERAPY | Age: 59
Discharge: HOME | End: 2019-06-05

## 2019-06-30 DIAGNOSIS — I10 ESSENTIAL HYPERTENSION WITH GOAL BLOOD PRESSURE LESS THAN 140/90: ICD-10-CM

## 2019-07-01 RX ORDER — IRBESARTAN 75 MG/1
TABLET ORAL
Qty: 90 TAB | Refills: 0 | Status: SHIPPED | OUTPATIENT
Start: 2019-07-01 | End: 2019-07-08

## 2019-07-02 RX ORDER — VERAPAMIL HYDROCHLORIDE 180 MG/1
180 CAPSULE, EXTENDED RELEASE ORAL DAILY
Qty: 30 CAP | Refills: 5 | Status: SHIPPED | OUTPATIENT
Start: 2019-07-02 | End: 2019-11-26

## 2019-07-08 ENCOUNTER — TELEPHONE (OUTPATIENT)
Dept: FAMILY MEDICINE CLINIC | Age: 59
End: 2019-07-08

## 2019-07-08 DIAGNOSIS — G44.009 MIGRAINE-CLUSTER HEADACHE SYNDROME: Primary | ICD-10-CM

## 2019-07-08 RX ORDER — LOSARTAN POTASSIUM 50 MG/1
50 TABLET ORAL DAILY
Qty: 90 TAB | Refills: 1 | Status: SHIPPED | OUTPATIENT
Start: 2019-07-08 | End: 2020-01-05

## 2019-07-08 RX ORDER — DIAZEPAM 5 MG/1
TABLET ORAL
Qty: 30 TAB | Refills: 5 | Status: SHIPPED | OUTPATIENT
Start: 2019-07-08 | End: 2019-07-17

## 2019-07-08 NOTE — TELEPHONE ENCOUNTER
Bryon Woody, LPN  You 1 hour ago (10:50 AM)      Patient been seeing ARNOL Hansen    Routing comment      [place]   to [place], Rox Patel, ARNOL            9:08 PM   Hello,I have been having a headache everyday for several weeks that feels like the headahces I would get before I started blood pressure medicine. I changed from Losartan to Ibersartan last fall and Idon't think the Evetta Canard is controlling my blood pressure. I would like to switch back to the Lorsartan. I changed from the [address] because someone said it could cause cancer but that was only if the Lorsartan was made in [place]. So I would like to switch back to the Lorsartan. I am having Cluster headaches now too and I am a patient of Luis Campbell NP at [facility] will let him know about my request too. Thank you.

## 2019-07-08 NOTE — TELEPHONE ENCOUNTER
----- Message from Lina Jones sent at 7/8/2019 10:23 AM EDT -----  Regarding: Dr. Hollis Maier Refill  Pt is requesting to change back to previous BP medication \"Losartan\". She currently has 1 of current medication left that she does not want to take. Pt's best contact is [phone].     Correct # [phone]

## 2019-07-08 NOTE — TELEPHONE ENCOUNTER
Joe Palma, ARNOL Logan LPN   Caller: Unspecified (Today, 10:35 AM)              Her medication changed to [employer]

## 2019-07-12 ENCOUNTER — TELEPHONE (OUTPATIENT)
Dept: NEUROLOGY | Age: 59
End: 2019-07-12

## 2019-07-12 NOTE — TELEPHONE ENCOUNTER
----- Message from Cordelia Ruiz sent at 7/12/2019 10:05 AM EDT -----  Regarding: ARNOL Villanueva/telephone  Pt (p) [phone], pt would like a return call back from the nurse she said she has some questions for the nurse.

## 2019-07-17 ENCOUNTER — OFFICE VISIT (OUTPATIENT)
Dept: NEUROLOGY | Age: 59
End: 2019-07-17

## 2019-07-17 VITALS
BODY MASS INDEX: 20.33 KG/M2 | HEIGHT: 65 IN | OXYGEN SATURATION: 98 % | WEIGHT: 122 LBS | SYSTOLIC BLOOD PRESSURE: 116 MMHG | DIASTOLIC BLOOD PRESSURE: 78 MMHG | HEART RATE: 77 BPM

## 2019-07-17 DIAGNOSIS — G44.009 MIGRAINE-CLUSTER HEADACHE SYNDROME: Primary | ICD-10-CM

## 2019-07-17 DIAGNOSIS — G43.111 INTRACTABLE MIGRAINE WITH AURA WITH STATUS MIGRAINOSUS: ICD-10-CM

## 2019-07-17 RX ORDER — GABAPENTIN 300 MG/1
300 CAPSULE ORAL 3 TIMES DAILY
Qty: 90 CAP | Refills: 5 | Status: SHIPPED | OUTPATIENT
Start: 2019-07-17 | End: 2019-07-17 | Stop reason: SDUPTHER

## 2019-07-17 RX ORDER — DIAZEPAM 5 MG/1
TABLET ORAL
Qty: 30 TAB | Refills: 5 | Status: SHIPPED | OUTPATIENT
Start: 2019-07-17 | End: 2019-07-18

## 2019-07-17 RX ORDER — GABAPENTIN 300 MG/1
300 CAPSULE ORAL 3 TIMES DAILY
Qty: 90 CAP | Refills: 5 | Status: SHIPPED | OUTPATIENT
Start: 2019-07-17 | End: 2019-10-11

## 2019-07-17 NOTE — PROGRESS NOTES
Cluster headaches start at 12 midnight and will go away at 2 or 3 am.     Feels better this morning. Normally she wakes up and feels terrible. She does have one every night. Never got script for valium. Takes excedrin, tylenol, and aleve every night.    Not sure when she took aimovig last.

## 2019-07-17 NOTE — LETTER
2019 9:19 AM 
 
RE:    [address] [address] I am referring my patient to you for evaluation of Cluster Migraines. Please see her 
pertinent patient information below. Problem List:    
Patient Active Problem List  
 Diagnosis Date Noted  Pelvic mass 2018  Hyperlipidemia 2016  Essential hypertension 2016  Osteoporosis, post-menopausal 10/01/2015  GERD (gastroesophageal reflux disease) 10/26/2010  AR (allergic rhinitis) 10/26/2010 Medical History:    
Past Medical History:  
Diagnosis Date  Abnormal Pap smear   
 h/o  Allergic rhinitis due to other allergen  Arrhythmia PAC'S - CHAMOMILLE TEA CAUSES  
 Esophageal reflux  Herpes zoster  Hypertension  Mixed hyperlipidemia  Nausea & vomiting  Osteopenia  Other atopic dermatitis and related conditions  Shingles rash 10/2015  
 small spot on left shoulder  Unspecified spontaneous  without mention of complication X2 Allergies:   No Known Allergies Medications:    
Current Outpatient Medications Medication Sig  
 diazePAM (VALIUM) 5 mg tablet Take 1 tablet by mouth nightly PRN  
 onabotulinumtoxinA (BOTOX) 200 unit injection Inject 155 units into face, head, neck , shoulders every 3 months for FDA indicated and approved migraine prevention  gabapentin (NEURONTIN) 300 mg capsule Take 1 Cap by mouth three (3) times daily. Max Daily Amount: 900 mg.  
 losartan (COZAAR) 50 mg tablet Take 1 Tab by mouth daily.  verapamil ER (VERELAN) 180 mg CR capsule Take 1 Cap by mouth daily.  erenumab-aooe (AIMOVIG AUTOINJECTOR) 140 mg/mL injection 1 mL by SubCUTAneous route every thirty (30) days.  OTHER   
 acetaminophen (TYLENOL EXTRA STRENGTH) 500 mg tablet Take  by mouth every six (6) hours as needed for Pain.  aspirin-acetaminophen-caffeine (EXCEDRIN ES) 250-250-65 mg per tablet Take 1 Tab by mouth.  ZOLMitriptan (ZOMIG ZMT) 5 mg disintegrating tablet 1 tab at onset of migraine; take 1 tab in 2 hours if headache remains; Limit: 2 tabs in 24 hours, not more than 3 days a week. 30 Day Rx  
 OTHER Rx Portable Oxygen Tank. Dx: Cluster Headache 346.2. Instructions: For cluster headache not responding to medications, turn on to 12 Liters per minute and inhale oxygen via mask for 15 minutes. Dispense one cannister, refill x 3.  
 LYSINE PO Take  by mouth daily.  Cetirizine (ZYRTEC) 10 mg cap Take 10 mg by mouth as needed.  cholecalciferol, vitamin D3, 2,000 unit tab Take 2,000 Units by mouth daily.  B.infantis-B.ani-B.long-B.bifi (PROBIOTIC 4X) 10-15 mg TbEC Take 1 Tab by mouth.  CALCIUM CARBONATE/VITAMIN D3 (CALCIUM + D PO) Take 1 Tab by mouth daily. Total 770 mg daily calcium and 1000 units vitamin d  MULTIVITAMIN PO Take 1 Tab by mouth daily. No current facility-administered medications for this visit. Surgical History:    
Past Surgical History:  
Procedure Laterality Date  COLPOSCOPY  29 y/o  
 with cryo  DILATION AND CURETTAGE    
 HX COLONOSCOPY  2001  HX WRIST FRACTURE TX Right   
 WRIST REPAIR Social History:    
Social History Socioeconomic History  Marital status:  Spouse name: Maybelle Koyanagi  Number of children: 2  
 Years of education: Not on file  Highest education level: Not on file Occupational History  Occupation: Nurse Employer: [id number] Tobacco Use  Smoking status: Never Smoker  Smokeless tobacco: Never Used Substance and Sexual Activity  Alcohol use: No  
  Alcohol/week: 0.8 standard drinks Types: 1 Glasses of wine per week  Drug use: No  
 Sexual activity: Yes  
  Partners: Male I appreciate your assistance in [facility] care  and look forward to your findings and recommendations. Sincerely, Yamila Johnson NP

## 2019-07-17 NOTE — PROGRESS NOTES
Raine Neumann is a 59 y.o. female who presents with the following  Chief Complaint   Patient presents with    Headache       HPI         Patient comes back in for a follow up for cluster migraine, headaches, migraines. She continues to have daily migraines usually coming on about night. She states they last about 6 hours or longer. She uses oxygen to help with rescue. She has failed multiple rescues. She is unable to sleep and can not function well at work. Aimovig hasn't helped yet. Tried Topamax made her feel bad. Also on Verapamil and Cozaar with no results. Failed Celexa also. Zomig tablets, percocet, zembrace have not really helped. She states her headaches come on strong. Tired Zembrace and this did not help. She has tried this multiple times without relief. She has spells like this multiple times a year. CT was normal.   She has tried OTC medications without relief.        She states the pain is a 10/10. In the entire head. She has light sensitivity, dizziness, and nausea with this. She has had to leave work multiple times due to this. She is on BP medication. Tried Celexa also.      On prednisone right now and this didn't work. DHE spray did not help  Decadron did not help.            No Known Allergies    Current Outpatient Medications   Medication Sig    diazePAM (VALIUM) 5 mg tablet Take 1 tablet by mouth nightly PRN    onabotulinumtoxinA (BOTOX) 200 unit injection Inject 155 units into face, head, neck , shoulders every 3 months for FDA indicated and approved migraine prevention    gabapentin (NEURONTIN) 300 mg capsule Take 1 Cap by mouth three (3) times daily. Max Daily Amount: 900 mg.  erenumab-aooe (AIMOVIG AUTOINJECTOR) 140 mg/mL injection 1 mL by SubCUTAneous route every thirty (30) days.  losartan (COZAAR) 50 mg tablet Take 1 Tab by mouth daily.  verapamil ER (VERELAN) 180 mg CR capsule Take 1 Cap by mouth daily.     OTHER     acetaminophen (TYLENOL EXTRA STRENGTH) 500 mg tablet Take  by mouth every six (6) hours as needed for Pain.  aspirin-acetaminophen-caffeine (EXCEDRIN ES) 250-250-65 mg per tablet Take 1 Tab by mouth.  ZOLMitriptan (ZOMIG ZMT) 5 mg disintegrating tablet 1 tab at onset of migraine; take 1 tab in 2 hours if headache remains; Limit: 2 tabs in 24 hours, not more than 3 days a week. 30 Day Rx    OTHER Rx Portable Oxygen Tank. Dx: Cluster Headache 346.2. Instructions: For cluster headache not responding to medications, turn on to 12 Liters per minute and inhale oxygen via mask for 15 minutes. Dispense one cannister, refill x 3.    LYSINE PO Take  by mouth daily.  Cetirizine (ZYRTEC) 10 mg cap Take 10 mg by mouth as needed.  cholecalciferol, vitamin D3, 2,000 unit tab Take 2,000 Units by mouth daily.  B.infantis-B.ani-B.long-B.bifi (PROBIOTIC 4X) 10-15 mg TbEC Take 1 Tab by mouth.  CALCIUM CARBONATE/VITAMIN D3 (CALCIUM + D PO) Take 1 Tab by mouth daily. Total 770 mg daily calcium and 1000 units vitamin d     MULTIVITAMIN PO Take 1 Tab by mouth daily. No current facility-administered medications for this visit.         Social History     Tobacco Use   Smoking Status Never Smoker   Smokeless Tobacco Never Used       Past Medical History:   Diagnosis Date    Abnormal Pap smear     h/o    Allergic rhinitis due to other allergen     Arrhythmia     PAC'S - CHAMOMILLE TEA CAUSES    Esophageal reflux     Herpes zoster     Hypertension     Mixed hyperlipidemia     Nausea & vomiting     Osteopenia     Other atopic dermatitis and related conditions     Shingles rash 10/2015    small spot on left shoulder    Unspecified spontaneous  without mention of complication     X2       Past Surgical History:   Procedure Laterality Date    COLPOSCOPY  31 y/o    with cryo    DILATION AND CURETTAGE      HX COLONOSCOPY      HX WRIST FRACTURE TX Right     WRIST REPAIR       Family History   Problem Relation Age of Onset  Hypertension Mother     Heart Disease Mother         CHF    Cancer Mother         kidney    Heart Attack Father     Coronary Artery Disease Father         with pacemaker    Asthma Father     Heart Disease Father         pacemaker    Stroke Maternal Grandmother     Heart Attack Paternal Grandfather     Anesth Problems Neg Hx        Social History     Socioeconomic History    Marital status:      Spouse name: Chuck Ni Number of children: 2    Years of education: Not on file    Highest education level: Not on file   Occupational History    Occupation: Nurse     Employer: [id number]   Tobacco Use    Smoking status: Never Smoker    Smokeless tobacco: Never Used   Substance and Sexual Activity    Alcohol use: No     Alcohol/week: 0.8 standard drinks     Types: 1 Glasses of wine per week    Drug use: No    Sexual activity: Yes     Partners: Male       Review of Systems   Eyes: Positive for blurred vision and photophobia. Negative for double vision. Respiratory: Negative for shortness of breath and wheezing. Gastrointestinal: Positive for nausea and vomiting. Neurological: Positive for dizziness and headaches. Negative for tingling, tremors, seizures and loss of consciousness. Remainder of comprehensive review is negative. Physical Exam :    Visit Vitals  /78   Pulse 77   Ht 5' 5\" (1.651 m)   Wt 55.3 kg (122 lb)   SpO2 98%   BMI 20.30 kg/m²       General: Well defined, nourished, and groomed individual in no acute distress.    Neck: Supple, nontender, no bruits, no pain with resistance to active range of motion.    Heart: Regular rate and rhythm, no murmurs, rub, or gallop. Normal S1S2.   Lungs: Clear to auscultation bilaterally with equal chest expansion, no cough, no wheeze  Musculoskeletal: Extremities revealed no edema and had full range of motion of joints.    Psych: Good mood and bright affect    NEUROLOGICAL EXAMINATION:    Mental Status: Alert and oriented to person, place, and time    Cranial Nerves:    II, III, IV, VI: Visual acuity grossly intact. Visual fields are normal.    Pupils are equal, round, and reactive to light and accommodation.    Extra-ocular movements are full and fluid. Fundoscopic exam was benign, no ptosis or nystagmus.    V-XII: Hearing is grossly intact. Facial features are symmetric, with normal sensation and strength. The palate rises symmetrically and the tongue protrudes midline. Sternocleidomastoids 5/5. Motor Examination: Normal tone, bulk, and strength, 5/5 muscle strength throughout. Coordination: Finger to nose was normal. No resting or intention tremor    Gait and Station: Steady while walking. Normal arm swing. No pronator drift. No muscle wasting or fasiculations noted. Reflexes: DTRs 2+ throughout. Results for orders placed or performed during the hospital encounter of 03/01/19   SAMPLES BEING HELD   Result Value Ref Range    SAMPLES BEING HELD 1RED,1LAV,1PST,1BLUE     COMMENT        Add-on orders for these samples will be processed based on acceptable specimen integrity and analyte stability, which may vary by analyte. Orders Placed This Encounter    REFERRAL TO NEUROLOGY     Referral Priority:   Routine     Referral Type:   Consultation     Referral Reason:   Specialty Services Required     Referred to Provider:   Saleem Davis MD     Number of Visits Requested:   1    REFERRAL TO NEUROLOGY     Referral Priority:   Routine     Referral Type:   Consultation     Referral Reason:   Specialty Services Required     Referred to Provider:   Domingo España MD     Number of Visits Requested:   1    DISCONTD: gabapentin (NEURONTIN) 300 mg capsule     Sig: Take 1 Cap by mouth three (3) times daily. Max Daily Amount: 900 mg.      Dispense:  90 Cap     Refill:  5    DISCONTD: onabotulinumtoxinA (BOTOX) 200 unit injection     Sig: Inject 155 units into face, head, neck , shoulders every 3 months for FDA indicated and approved migraine prevention     Dispense:  1 Vial     Refill:  5    diazePAM (VALIUM) 5 mg tablet     Sig: Take 1 tablet by mouth nightly PRN     Dispense:  30 Tab     Refill:  5    DISCONTD: onabotulinumtoxinA (BOTOX) 200 unit injection     Sig: Inject 155 units into face, head, neck , shoulders every 3 months for FDA indicated and approved migraine prevention     Dispense:  1 Vial     Refill:  5    onabotulinumtoxinA (BOTOX) 200 unit injection     Sig: Inject 155 units into face, head, neck , shoulders every 3 months for FDA indicated and approved migraine prevention     Dispense:  1 Vial     Refill:  5    gabapentin (NEURONTIN) 300 mg capsule     Sig: Take 1 Cap by mouth three (3) times daily. Max Daily Amount: 900 mg. Dispense:  90 Cap     Refill:  5    erenumab-aooe (AIMOVIG AUTOINJECTOR) 140 mg/mL injection     Si mL by SubCUTAneous route every thirty (30) days. Dispense:  1 Each     Refill:  5       1. Migraine-cluster headache syndrome    2. Intractable migraine with aura with status migrainosus        discussed migraines. Discussed treatment. Failed AED, SSRI, and blood pressure medications. Still having 30 days a month with migraine that is intense, lasting 6 hours or longer. Nothing helps rescue but oxygen. Continue to try Aimovig 140 mg every 30 days for prevention. Refer to Dr. Mohan Tipton at [facility]. Initiate Botox for FDA indicated and approved migraine prevention for chronic migraines. Try Gabapentin for PRN rescue at night before bed. Can try Valium also to help sleep.              This note will not be viewable in [employer]

## 2019-07-18 DIAGNOSIS — G43.919 INTRACTABLE MIGRAINE WITHOUT STATUS MIGRAINOSUS, UNSPECIFIED MIGRAINE TYPE: Primary | ICD-10-CM

## 2019-07-18 RX ORDER — DIAZEPAM 2 MG/1
TABLET ORAL
Qty: 60 TAB | Refills: 5 | Status: SHIPPED | OUTPATIENT
Start: 2019-07-18 | End: 2019-10-11

## 2019-07-19 NOTE — TELEPHONE ENCOUNTER
PA APPROVED for Botox Inj & Med by Sandee Rosales. Effective Dates 07/19/19 - 01/19/20 for two visits. Case #[id number]. Approval will be scanned into media for review. Have sent Rx to [facility].  L/M for patient informing them of approval and process

## 2019-07-25 ENCOUNTER — TELEPHONE (OUTPATIENT)
Dept: NEUROLOGY | Age: 59
End: 2019-07-25

## 2019-07-25 NOTE — TELEPHONE ENCOUNTER
[facility] called stated that the patient has pharmacy benefits and the botox needs to be processed though that before moving to medical. So please do cover my meds     Key:[id number]           Patient last Name : [facility]                                :56   [facility]   Pharmacy hours are

## 2019-07-26 NOTE — TELEPHONE ENCOUNTER
PA APPROVED for Botox 200 units/1 vial by Augusta BERGMAN. Effective dates 07/26/19 - 01/22/20. Case #[id number]. Approval scanned into media for review. Please send Rx to patient's Pharmacy (if necessary). Called [facility] with approval info, who informed me that Botox is exclusive to [facility]. After transferring me to [employer], updated patient's account with approval information.

## 2019-07-26 NOTE — TELEPHONE ENCOUNTER
Per note received from [facility], Botox needs a PA through Rx benefits before claim goes to medical benefits. [facility] PA to [facility] [place] [facility]. Status Pending. Allow 24-72 hours for response.

## 2019-07-31 ENCOUNTER — TELEPHONE (OUTPATIENT)
Dept: NEUROLOGY | Age: 59
End: 2019-07-31

## 2019-07-31 NOTE — TELEPHONE ENCOUNTER
R/t call to patient. She states that [facility] told her that she could not get botox delivered until she has an appt. Informed patient that was not true and I would call to clarify for specialty pharmacy. Called [facility] specialty pharmacy. Spoke with [facility]. Confirmed her co pay and they will reach out to her to complete new patient enrollment.

## 2019-07-31 NOTE — TELEPHONE ENCOUNTER
----- Message from Jarret Diaz sent at 7/31/2019  8:25 AM EDT -----  Regarding: ARNOL Villanueva/telephone  Pt would like appt to get her Botox Injection, please call pt at [phone].

## 2019-07-31 NOTE — TELEPHONE ENCOUNTER
[place]ina  called from [address] [place] [address] [phone] Botox shipment will be here  08/02/19 I said ok per nurse Duyen Cornea

## 2019-07-31 NOTE — TELEPHONE ENCOUNTER
----- Message from Hector Stone sent at 7/31/2019 11:58 AM EDT -----  Regarding: ARNOL Villanueva/Rx  General Message/Vendor Calls    Caller's first and last name: Jamilah Schumacher ([facility] pharmacy)      Reason for call: Confirm pt's delivery date       Callback required yes/no and why:Yes      Best contact number(s):[phone]      Details to clarify the request:      Hector Stone

## 2019-08-02 ENCOUNTER — TELEPHONE (OUTPATIENT)
Dept: NEUROLOGY | Age: 59
End: 2019-08-02

## 2019-08-28 ENCOUNTER — TELEPHONE (OUTPATIENT)
Dept: NEUROLOGY | Age: 59
End: 2019-08-28

## 2019-08-28 NOTE — TELEPHONE ENCOUNTER
R/t call to patient. Informed her paperwork was faxed. Patient would like to  copy from giordano tomorrow.  Copy put up front in drawer for

## 2019-09-30 ENCOUNTER — TELEPHONE (OUTPATIENT)
Dept: NEUROLOGY | Age: 59
End: 2019-09-30

## 2019-09-30 NOTE — TELEPHONE ENCOUNTER
----- Message from Javed Silva sent at 9/30/2019 11:34 AM EDT -----  Regarding: Dr. Christina Michaud would like to know if she can take [address] and see if she can get a botox injection.  Contact is [phone]

## 2019-10-11 ENCOUNTER — OFFICE VISIT (OUTPATIENT)
Dept: NEUROLOGY | Age: 59
End: 2019-10-11

## 2019-10-11 VITALS
BODY MASS INDEX: 20.3 KG/M2 | HEART RATE: 76 BPM | DIASTOLIC BLOOD PRESSURE: 72 MMHG | WEIGHT: 122 LBS | SYSTOLIC BLOOD PRESSURE: 108 MMHG | OXYGEN SATURATION: 98 %

## 2019-10-11 RX ORDER — TRAZODONE HYDROCHLORIDE 50 MG/1
TABLET ORAL
Qty: 60 TAB | Refills: 5 | Status: SHIPPED | OUTPATIENT
Start: 2019-10-11 | End: 2019-11-04 | Stop reason: SDUPTHER

## 2019-10-11 RX ORDER — TRAZODONE HYDROCHLORIDE 50 MG/1
50 TABLET ORAL
Qty: 30 TAB | Refills: 5 | Status: SHIPPED | OUTPATIENT
Start: 2019-10-11 | End: 2019-10-11 | Stop reason: SDUPTHER

## 2019-10-11 NOTE — LETTER
NOTIFICATION RETURN TO WORK / SCHOOL 
 
10/11/2019 2:56 PM 
 
Ms. Armida Holliday [address] [place] [address] Drive [address] To Whom It May Concern: 
 
Armida Holliday is currently under the care of [facility]. She will return to work/school on: 10/14/19 If there are questions or concerns please have the patient contact our office. Sincerely, Alberto Pavon NP

## 2019-10-11 NOTE — LETTER
10/11/2019 3:00 PM 
 
Ms. Gavin Dawson [address] [place] [address] Drive [address] To Whom it may concern: 
 
Gavin Dawson is scheduled for an appointment 10/15/2019 with [facility]. Please allow this patient to take the day off for this appointment and she will return to work 10/16/19. If you have any questions please contact the office [phone]. Sincerely, Leon Glass NP

## 2019-10-14 NOTE — PROGRESS NOTES
Whitney Henriquez is a 59 y.o. female who presents with the following  Chief Complaint   Patient presents with    Migraine       HPI    Patient comes back for cluster migraines. She is accompanied with her sister. Things have gotten even worse since last visit. She did see Dr. Mariposa Tracey and did a 3 dose of Emgality and had about 20 days migraine free. They are since back. She states the PA has gotten mixed up also and they wont give her the medication so she is stuck right now. Pain today is a 7/10 in the front of her head. Sharp, stabbing. Still waking her up out of sleep. She is wanting to try Botox because she is at the end of her rope. She feels like things are getting worse. Also wants to try the RADHA now. As a full recap from previous visits,   Patient comes back in for a follow up for cluster migraine, headaches, migraines. She continues to have daily migraines usually coming on about night. She states they last about 6 hours or longer. She uses oxygen to help with rescue. She has failed multiple rescues. She is unable to sleep and can not function well at work. Aimovig hasn't helped yet. Tried Topamax made her feel bad. Also on Verapamil and Cozaar with no results. Failed Celexa also.      Zomig tablets, percocet, zembrace have not really helped. She states her headaches come on strong. Tired Zembrace and this did not help. She has tried this multiple times without relief. She has spells like this multiple times a year. CT was normal.   She has tried OTC medications without relief.         She states the pain is a 10/10. In the entire head. She has light sensitivity, dizziness, and nausea with this. She has had to leave work multiple times due to this. She is on BP medication. Tried Celexa also.      On prednisone right now and this didn't work.    DHE spray did not help  Decadron did not help.                                  No Known Allergies    Current Outpatient Medications Medication Sig    traZODone (DESYREL) 50 mg tablet Take 1-2 tablets by mouth nightly.  onabotulinumtoxinA (BOTOX) 200 unit injection Inject 155 units into face, head, neck , shoulders every 3 months for FDA indicated and approved migraine prevention    losartan (COZAAR) 50 mg tablet Take 1 Tab by mouth daily.  verapamil ER (VERELAN) 180 mg CR capsule Take 1 Cap by mouth daily.  OTHER     acetaminophen (TYLENOL EXTRA STRENGTH) 500 mg tablet Take  by mouth every six (6) hours as needed for Pain.  aspirin-acetaminophen-caffeine (EXCEDRIN ES) 250-250-65 mg per tablet Take 1 Tab by mouth.  ZOLMitriptan (ZOMIG ZMT) 5 mg disintegrating tablet 1 tab at onset of migraine; take 1 tab in 2 hours if headache remains; Limit: 2 tabs in 24 hours, not more than 3 days a week. 30 Day Rx    OTHER Rx Portable Oxygen Tank. Dx: Cluster Headache 346.2. Instructions: For cluster headache not responding to medications, turn on to 12 Liters per minute and inhale oxygen via mask for 15 minutes. Dispense one cannister, refill x 3.    LYSINE PO Take  by mouth daily.  Cetirizine (ZYRTEC) 10 mg cap Take 10 mg by mouth as needed.  cholecalciferol, vitamin D3, 2,000 unit tab Take 2,000 Units by mouth daily.  B.infantis-B.ani-B.long-B.bifi (PROBIOTIC 4X) 10-15 mg TbEC Take 1 Tab by mouth.  CALCIUM CARBONATE/VITAMIN D3 (CALCIUM + D PO) Take 1 Tab by mouth daily. Total 770 mg daily calcium and 1000 units vitamin d     MULTIVITAMIN PO Take 1 Tab by mouth daily. No current facility-administered medications for this visit.         Social History     Tobacco Use   Smoking Status Never Smoker   Smokeless Tobacco Never Used       Past Medical History:   Diagnosis Date    Abnormal Pap smear     h/o    Allergic rhinitis due to other allergen     Arrhythmia     PAC'S - CHAMOMILLE TEA CAUSES    Esophageal reflux     Herpes zoster     Hypertension     Mixed hyperlipidemia     Nausea & vomiting     Osteopenia  Other atopic dermatitis and related conditions     Shingles rash 10/2015    small spot on left shoulder    Unspecified spontaneous  without mention of complication     X2       Past Surgical History:   Procedure Laterality Date    COLPOSCOPY  29 y/o    with cryo    DILATION AND CURETTAGE      HX COLONOSCOPY      HX WRIST FRACTURE TX Right     WRIST REPAIR       Family History   Problem Relation Age of Onset    Hypertension Mother     Heart Disease Mother         CHF    Cancer Mother         kidney    Heart Attack Father     Coronary Artery Disease Father         with pacemaker    Asthma Father     Heart Disease Father         pacemaker    Stroke Maternal Grandmother     Heart Attack Paternal Grandfather     Anesth Problems Neg Hx        Social History     Socioeconomic History    Marital status:      Spouse name: Cathy Jensen Number of children: 2    Years of education: Not on file    Highest education level: Not on file   Occupational History    Occupation: Nurse     Employer: [id number]   Tobacco Use    Smoking status: Never Smoker    Smokeless tobacco: Never Used   Substance and Sexual Activity    Alcohol use: No     Alcohol/week: 0.8 standard drinks     Types: 1 Glasses of wine per week    Drug use: No    Sexual activity: Yes     Partners: Male       Review of Systems   Eyes: Positive for blurred vision and photophobia. Negative for double vision. Respiratory: Negative for shortness of breath and wheezing. Cardiovascular: Negative for chest pain and palpitations. Gastrointestinal: Positive for nausea. Negative for vomiting. Neurological: Positive for dizziness and headaches. Negative for tingling, tremors, seizures and loss of consciousness. Psychiatric/Behavioral: The patient has insomnia. Remainder of comprehensive review is negative.      Physical Exam :    Visit Vitals  /72   Pulse 76   Wt 55.3 kg (122 lb)   SpO2 98%   BMI 20.30 kg/m²       General: Well defined, nourished, and groomed individual in no acute distress.    Neck: Supple, nontender, no bruits, no pain with resistance to active range of motion.    Heart: Regular rate and rhythm, no murmurs, rub, or gallop. Normal S1S2. Lungs: Clear to auscultation bilaterally with equal chest expansion, no cough, no wheeze  Musculoskeletal: Extremities revealed no edema and had full range of motion of joints.    Psych: Good mood and bright affect    NEUROLOGICAL EXAMINATION:    Mental Status: Alert and oriented to person, place, and time    Cranial Nerves:    II, III, IV, VI: Visual acuity grossly intact. Visual fields are normal.    Pupils are equal, round, and reactive to light and accommodation.    Extra-ocular movements are full and fluid. Fundoscopic exam was benign, no ptosis or nystagmus.    V-XII: Hearing is grossly intact. Facial features are symmetric, with normal sensation and strength. The palate rises symmetrically and the tongue protrudes midline. Sternocleidomastoids 5/5. Motor Examination: Normal tone, bulk, and strength, 5/5 muscle strength throughout. Coordination: Finger to nose was normal. No resting or intention tremor    Gait and Station: Steady while walking. Normal arm swing. No pronator drift. No muscle wasting or fasiculations noted. Reflexes: DTRs 2+ throughout. Results for orders placed or performed during the hospital encounter of 03/01/19   SAMPLES BEING HELD   Result Value Ref Range    SAMPLES BEING HELD 1RED,1LAV,1PST,1BLUE     COMMENT        Add-on orders for these samples will be processed based on acceptable specimen integrity and analyte stability, which may vary by analyte.        Orders Placed This Encounter    REFERRAL TO INFUSION THERAPY     Referral Priority:   Routine     Referral Type:   Consultation     Referral Reason:   Specialty Services Required     Number of Visits Requested:   1    DISCONTD: traZODone (DESYREL) 50 mg tablet     Sig: Take 1 Tab by mouth nightly. Dispense:  30 Tab     Refill:  5    traZODone (DESYREL) 50 mg tablet     Sig: Take 1-2 tablets by mouth nightly. Dispense:  60 Tab     Refill:  5       1. Chronic migraine        She does follow up with Dr. Nicholas Heredia in November. Keep this. She is set to try  Botox next week. Try Trazodone to help her sleep as she is unable to sleep more then a few hours due to waking up with severe pain. We will also refer back to infusion center to help with [facility] as she has failed just about everything else in regard to rescue. She will keep all other current prevention for now.              This note will not be viewable in [employer]

## 2019-10-15 ENCOUNTER — OFFICE VISIT (OUTPATIENT)
Dept: NEUROLOGY | Age: 59
End: 2019-10-15

## 2019-10-15 VITALS
BODY MASS INDEX: 20.33 KG/M2 | SYSTOLIC BLOOD PRESSURE: 122 MMHG | HEIGHT: 65 IN | DIASTOLIC BLOOD PRESSURE: 74 MMHG | WEIGHT: 122 LBS | HEART RATE: 73 BPM | OXYGEN SATURATION: 99 %

## 2019-10-15 NOTE — LETTER
10/15/2019 9:45 AM 
 
Ms. Elder Montalvo [address] [place] [address] Drive [address] To Whom It May Concern,  
 
Ms. Elder Montalvo is currently under the care of [facility]. Due to her current medical condition she should refrain from getting the flu shot until the last possible day. If you have any further questions please contact our office. Sincerely, Maikol Gramajo NP

## 2019-10-15 NOTE — LETTER
10/15/2019 9:44 AM 
 
Ms. Ethel Romero [address] [place] [address] Drive [phone] To Whom It May Concern,  
 
Ms. Ethel Romero was seen in the office today at [address] [place] [address] Neurology and should be excused from work today on 10/15/2019 to return 10/16/2019. If you have any further questions please contact our office. Sincerely, She Landaverde NP

## 2019-10-15 NOTE — PROGRESS NOTES
Botox Procedure  Pain scale prior to procedure 4  /10  Pain scale post procedure     /10    Been treated for headaches greater than 6 months  Consent signed     Instructions post injection discussed with patient   1. Do not lay flat for 4 hrs  2. Do not press on injection sites up to 24 hrs.         What to expect  Possible bleeding and/or swelling  at injection sites      Patient verbalizes understanding

## 2019-10-15 NOTE — PROGRESS NOTES
[facility]  OFFICE PROCEDURE PROGRESS NOTE        Chart reviewed for the following:   I, [de-identified] M ARNOL Herr, have reviewed the History, Physical and updated the Allergic reactions for [facility] performed immediately prior to start of procedure:   INicole NP, have performed the following reviews on [facility] prior to the start of the procedure:            * Patient was identified by name and date of birth   * Agreement on procedure being performed was verified  * Risks and Benefits explained to the patient  * Procedure site verified and marked as necessary  * Patient was positioned for comfort  * Consent was signed and verified     Time: 0900      Date of procedure: 10/15/2019    Procedure performed by:  Nicole Gonzalez NP    Provider assisted by: None    Patient assisted by: None    How tolerated by patient: tolerated the procedure well with no complications    Post Procedural Pain Scale: 2 - Hurts Little Bit    Comments: None    Botox Injection Note       Indication: patient has chronic recurrent migraine, has greater than 15 migraine headaches per month, has failed two or more categories of preventatives    Procedure:   Botox concentration: 200 units in 4 ml of preservative-free normal saline. 31 sites injections, distribution as follow      Units/site  Sites Sides Subtotal    Procerus 5 1 1 5    5 1 2 10   Frontalis 5 2 2 20   Temporalis 5 4 2 40   Occipitalis 5 3 2 30   Upper cervical paraspinalis 5 2 2 20   Trapezius 5 3 2 30         200 units Botox were reconstituted, 155 units injected as above and the remainder was unavoidably wasted.      Patient tolerated procedure well.     _____________________________   Alicia Mccrary NP

## 2019-10-16 RX ORDER — METOCLOPRAMIDE HYDROCHLORIDE 5 MG/ML
10 INJECTION INTRAMUSCULAR; INTRAVENOUS ONCE
Status: COMPLETED | OUTPATIENT
Start: 2019-10-21 | End: 2019-10-21

## 2019-10-16 RX ORDER — SODIUM CHLORIDE 9 MG/ML
25 INJECTION, SOLUTION INTRAVENOUS CONTINUOUS
Status: DISPENSED | OUTPATIENT
Start: 2019-10-21 | End: 2019-10-21

## 2019-10-16 RX ORDER — DIPHENHYDRAMINE HYDROCHLORIDE 50 MG/ML
25 INJECTION, SOLUTION INTRAMUSCULAR; INTRAVENOUS ONCE
Status: COMPLETED | OUTPATIENT
Start: 2019-10-21 | End: 2019-10-21

## 2019-10-16 RX ORDER — DEXAMETHASONE SODIUM PHOSPHATE 4 MG/ML
10 INJECTION, SOLUTION INTRA-ARTICULAR; INTRALESIONAL; INTRAMUSCULAR; INTRAVENOUS; SOFT TISSUE ONCE
Status: COMPLETED | OUTPATIENT
Start: 2019-10-21 | End: 2019-10-21

## 2019-10-18 RX ORDER — SODIUM CHLORIDE 9 MG/ML
25 INJECTION, SOLUTION INTRAVENOUS CONTINUOUS
Status: DISCONTINUED | OUTPATIENT
Start: 2019-10-23 | End: 2019-10-24 | Stop reason: HOSPADM

## 2019-10-18 RX ORDER — DEXAMETHASONE SODIUM PHOSPHATE 4 MG/ML
10 INJECTION, SOLUTION INTRA-ARTICULAR; INTRALESIONAL; INTRAMUSCULAR; INTRAVENOUS; SOFT TISSUE ONCE
Status: ACTIVE | OUTPATIENT
Start: 2019-10-23 | End: 2019-10-23

## 2019-10-18 RX ORDER — METOCLOPRAMIDE HYDROCHLORIDE 5 MG/ML
10 INJECTION INTRAMUSCULAR; INTRAVENOUS ONCE
Status: ACTIVE | OUTPATIENT
Start: 2019-10-23 | End: 2019-10-23

## 2019-10-18 RX ORDER — DIPHENHYDRAMINE HYDROCHLORIDE 50 MG/ML
25 INJECTION, SOLUTION INTRAMUSCULAR; INTRAVENOUS ONCE
Status: ACTIVE | OUTPATIENT
Start: 2019-10-23 | End: 2019-10-23

## 2019-10-21 ENCOUNTER — HOSPITAL ENCOUNTER (OUTPATIENT)
Dept: INFUSION THERAPY | Age: 59
Discharge: HOME | End: 2019-10-21
Payer: COMMERCIAL

## 2019-10-21 VITALS
SYSTOLIC BLOOD PRESSURE: 132 MMHG | OXYGEN SATURATION: 97 % | HEART RATE: 73 BPM | DIASTOLIC BLOOD PRESSURE: 83 MMHG | WEIGHT: 116.4 LBS | HEIGHT: 65 IN | BODY MASS INDEX: 19.39 KG/M2 | TEMPERATURE: 97.6 F | RESPIRATION RATE: 18 BRPM

## 2019-10-21 PROCEDURE — 74011250636 HC RX REV CODE- 250/636: Performed by: NURSE PRACTITIONER

## 2019-10-21 PROCEDURE — 74011000250 HC RX REV CODE- 250: Performed by: NURSE PRACTITIONER

## 2019-10-21 PROCEDURE — 96374 THER/PROPH/DIAG INJ IV PUSH: CPT

## 2019-10-21 PROCEDURE — 96375 TX/PRO/DX INJ NEW DRUG ADDON: CPT

## 2019-10-21 PROCEDURE — 74011000258 HC RX REV CODE- 258: Performed by: NURSE PRACTITIONER

## 2019-10-21 PROCEDURE — 96361 HYDRATE IV INFUSION ADD-ON: CPT

## 2019-10-21 PROCEDURE — 96365 THER/PROPH/DIAG IV INF INIT: CPT

## 2019-10-21 RX ORDER — SODIUM CHLORIDE 0.9 % (FLUSH) 0.9 %
5-10 SYRINGE (ML) INJECTION AS NEEDED
Status: DISCONTINUED | OUTPATIENT
Start: 2019-10-21 | End: 2019-10-22 | Stop reason: HOSPADM

## 2019-10-21 RX ADMIN — DIHYDROERGOTAMINE MESYLATE 1 MG: 1 INJECTION, SOLUTION INTRAMUSCULAR; INTRAVENOUS; SUBCUTANEOUS at 09:20

## 2019-10-21 RX ADMIN — DIPHENHYDRAMINE HYDROCHLORIDE 25 MG: 50 INJECTION, SOLUTION INTRAMUSCULAR; INTRAVENOUS at 08:43

## 2019-10-21 RX ADMIN — SODIUM CHLORIDE 25 ML/HR: 900 INJECTION, SOLUTION INTRAVENOUS at 08:36

## 2019-10-21 RX ADMIN — METOCLOPRAMIDE 10 MG: 5 INJECTION, SOLUTION INTRAMUSCULAR; INTRAVENOUS at 08:37

## 2019-10-21 RX ADMIN — Medication 10 ML: at 11:15

## 2019-10-21 RX ADMIN — SODIUM CHLORIDE 500 MG: 9 INJECTION, SOLUTION INTRAVENOUS at 09:51

## 2019-10-21 RX ADMIN — DEXAMETHASONE SODIUM PHOSPHATE 10 MG: 4 INJECTION, SOLUTION INTRAMUSCULAR; INTRAVENOUS at 08:39

## 2019-10-21 NOTE — PROGRESS NOTES
Outpatient Infusion Center Progress Note    0800 Pt admit to [facility] for Ember D1 ambulatory in stable condition. Assessment completed. No new concerns voiced. PIV established to L AC. Patient states, \"no chances of being pregnant\" and declines pregnancy testing. Visit Vitals  /82 (BP 1 Location: Left arm, BP Patient Position: At rest)   Pulse 66   Temp 96.9 °F (36.1 °C)   Resp 18   Ht 5' 5\" (1.651 m)   Wt 52.8 kg (116 lb 6.4 oz)   SpO2 97%   Breastfeeding? No   BMI 19.37 kg/m²       Medications Administered     0.9% sodium chloride infusion     Admin Date  10/21/2019 Action  New Bag Dose  25 mL/hr Rate  25 mL/hr Route  IntraVENous Administered By  Jason Chairez RN          dexamethasone (DECADRON) 4 mg/mL injection 10 mg     Admin Date  10/21/2019 Action  Given Dose  10 mg Route  IntraVENous Administered By  Jason Chairez RN          dihydroergotamine (DHE-45) 1 mg in 0.9% sodium chloride 50 mL, overfill volume 5 mL IVPB     Admin Date  10/21/2019 Action  Given Dose  1 mg Route  IntraVENous Administered By  Jason Chairez RN          diphenhydrAMINE (BENADRYL) injection 25 mg     Admin Date  10/21/2019 Action  Given Dose  25 mg Route  IntraVENous Administered By  Jason Chairez RN          metoclopramide HCl (REGLAN) injection 10 mg     Admin Date  10/21/2019 Action  Given Dose  10 mg Route  IntraVENous Administered By  Jason Chairez RN          sodium chloride (NS) flush 5-10 mL     Admin Date  10/21/2019 Action  Given Dose  10 mL Route  IntraVENous Administered By  Jason Chairez RN          valproate (DEPACON) 500 mg in 0.9% sodium chloride 50 mL, overfill volume 5 mL IVPB     Admin Date  10/21/2019 Action  New Bag Dose  500 mg Rate  60 mL/hr Route  IntraVENous Administered By  Jason Chairez RN                  1111 Pt tolerated treatment well. PIV removed per patient request. D/c home ambulatory in no distress. Pt aware of next appointment scheduled for 10/22.

## 2019-10-22 ENCOUNTER — TELEPHONE (OUTPATIENT)
Dept: NEUROLOGY | Age: 59
End: 2019-10-22

## 2019-10-22 NOTE — TELEPHONE ENCOUNTER
----- Message from Alpha Indian sent at 10/21/2019  3:26 PM EDT -----  Regarding: ARNOL Villanueva/telephone  Pt would like to speak to the doctor regarding a Infusion she had done today,  that has her head hurting very bad, please call pt today at [phone], please call pt back today.

## 2019-10-23 ENCOUNTER — HOSPITAL ENCOUNTER (OUTPATIENT)
Dept: INFUSION THERAPY | Age: 59
Discharge: HOME | End: 2019-10-23
Payer: COMMERCIAL

## 2019-11-04 RX ORDER — TRAZODONE HYDROCHLORIDE 50 MG/1
TABLET ORAL
Qty: 180 TAB | Refills: 1 | Status: SHIPPED | OUTPATIENT
Start: 2019-11-04 | End: 2019-11-26

## 2019-11-23 DIAGNOSIS — G44.009 MIGRAINE-CLUSTER HEADACHE SYNDROME: ICD-10-CM

## 2019-11-25 RX ORDER — VERAPAMIL HYDROCHLORIDE 120 MG/1
CAPSULE, EXTENDED RELEASE ORAL
Qty: 90 CAP | Refills: 1 | Status: SHIPPED | OUTPATIENT
Start: 2019-11-25 | End: 2020-05-18

## 2019-11-26 ENCOUNTER — OFFICE VISIT (OUTPATIENT)
Dept: NEUROLOGY | Age: 59
End: 2019-11-26

## 2019-11-26 VITALS
WEIGHT: 116 LBS | DIASTOLIC BLOOD PRESSURE: 80 MMHG | BODY MASS INDEX: 19.33 KG/M2 | OXYGEN SATURATION: 98 % | HEIGHT: 65 IN | HEART RATE: 83 BPM | SYSTOLIC BLOOD PRESSURE: 128 MMHG

## 2019-11-26 DIAGNOSIS — G43.919 INTRACTABLE MIGRAINE WITHOUT STATUS MIGRAINOSUS, UNSPECIFIED MIGRAINE TYPE: ICD-10-CM

## 2019-11-26 RX ORDER — DIAZEPAM 2 MG/1
TABLET ORAL
Qty: 60 TAB | Refills: 5 | Status: SHIPPED | OUTPATIENT
Start: 2019-11-26 | End: 2020-06-24

## 2019-11-26 NOTE — PROGRESS NOTES
Salomon Tavares is a 59 y.o. female who presents with the following  Chief Complaint   Patient presents with    Migraine       HPI       Patient comes back for cluster migraines. Pain today is a 7/10 in the front of her head. Sharp, stabbing. Still waking her up out of sleep. RADHA did not help. Went to Dr. Дмитрий Harman and she took off [address]. Wants to continue her Botox. She will switch to her. Keeping Verapamil.      As a full recap from previous visits,   Patient comes back in for a follow up for cluster migraine, headaches, migraines.   She continues to have daily migraines usually coming on about night. She states they last about 6 hours or longer. She uses oxygen to help with rescue. She has failed multiple rescues.   She is unable to sleep and can not function well at work. Aimovig hasn't helped yet. Tried Topamax made her feel bad. Also on Verapamil and Cozaar with no results. Failed Celexa also.      Zomig tablets, percocet, zembrace have not really helped. She states her headaches come on strong. Tired Zembrace and this did not help. She has tried this multiple times without relief. She has spells like this multiple times a year. CT was normal.   She has tried OTC medications without relief.         She states the pain is a 10/10 when it comes on. In the entire head. She has light sensitivity, dizziness, and nausea with this. She has had to leave work multiple times due to this. She is on BP medication. Tried Celexa also.                                   No Known Allergies    Current Outpatient Medications   Medication Sig    fexofenadine HCl (ALLEGRA PO) Take  by mouth.  diazePAM (VALIUM) 2 mg tablet Take 1-2 tablets by mouth nightly.  verapamil ER (VERELAN) 120 mg ER capsule TAKE 1 CAPSULE BY MOUTH EVERY DAY    OTHER Rx Portable Oxygen Tank. Dx: Cluster Headache 346.2. Instructions:  For cluster headache not responding to medications, turn on to 8-12 Liters per minute and inhale oxygen via mask for 15 minutes. Dispense one cannister with concentrator, refill x 3.    onabotulinumtoxinA (BOTOX) 200 unit injection Inject 155 units into face, head, neck , shoulders every 3 months for FDA indicated and approved migraine prevention    losartan (COZAAR) 50 mg tablet Take 1 Tab by mouth daily.  OTHER     acetaminophen (TYLENOL EXTRA STRENGTH) 500 mg tablet Take  by mouth every six (6) hours as needed for Pain.  aspirin-acetaminophen-caffeine (EXCEDRIN ES) 250-250-65 mg per tablet Take 1 Tab by mouth.  LYSINE PO Take  by mouth daily.  cholecalciferol, vitamin D3, 2,000 unit tab Take 2,000 Units by mouth daily.  B.infantis-B.ani-B.long-B.bifi (PROBIOTIC 4X) 10-15 mg TbEC Take 1 Tab by mouth.  CALCIUM CARBONATE/VITAMIN D3 (CALCIUM + D PO) Take 1 Tab by mouth daily. Total 770 mg daily calcium and 1000 units vitamin d     MULTIVITAMIN PO Take 1 Tab by mouth daily. No current facility-administered medications for this visit.         Social History     Tobacco Use   Smoking Status Never Smoker   Smokeless Tobacco Never Used       Past Medical History:   Diagnosis Date    Abnormal Pap smear     h/o    Allergic rhinitis due to other allergen     Arrhythmia     PAC'S - CHAMOMILLE TEA CAUSES    Esophageal reflux     Herpes zoster     Hypertension     Mixed hyperlipidemia     Nausea & vomiting     Osteopenia     Other atopic dermatitis and related conditions     Shingles rash 10/2015    small spot on left shoulder    Unspecified spontaneous  without mention of complication     X2       Past Surgical History:   Procedure Laterality Date    COLPOSCOPY  29 y/o    with cryo    DILATION AND CURETTAGE      HX COLONOSCOPY      HX WRIST FRACTURE TX Right     WRIST REPAIR       Family History   Problem Relation Age of Onset    Hypertension Mother     Heart Disease Mother         CHF    Cancer Mother         kidney    Heart Attack Father     Coronary Artery Disease Father         with pacemaker    Asthma Father     Heart Disease Father         pacemaker    Stroke Maternal Grandmother     Heart Attack Paternal Grandfather     Anesth Problems Neg Hx        Social History     Socioeconomic History    Marital status:      Spouse name: Pepe Singleton Number of children: 2    Years of education: Not on file    Highest education level: Not on file   Occupational History    Occupation: Nurse     Employer: [id number]   Tobacco Use    Smoking status: Never Smoker    Smokeless tobacco: Never Used   Substance and Sexual Activity    Alcohol use: No     Alcohol/week: 0.8 standard drinks     Types: 1 Glasses of wine per week    Drug use: No    Sexual activity: Yes     Partners: Male       Review of Systems   Eyes: Positive for blurred vision and photophobia. Negative for double vision. Respiratory: Negative for shortness of breath and wheezing. Gastrointestinal: Positive for nausea. Negative for vomiting. Neurological: Positive for headaches. Negative for dizziness, tingling and tremors. Remainder of comprehensive review is negative. Physical Exam :    Visit Vitals  /80   Pulse 83   Ht 5' 5\" (1.651 m)   Wt 52.6 kg (116 lb)   SpO2 98%   BMI 19.30 kg/m²       General: Well defined, nourished, and groomed individual in no acute distress.    Neck: Supple, nontender, no bruits, no pain with resistance to active range of motion.    Heart: Regular rate and rhythm, no murmurs, rub, or gallop. Normal S1S2. Lungs: Clear to auscultation bilaterally with equal chest expansion, no cough, no wheeze  Musculoskeletal: Extremities revealed no edema and had full range of motion of joints.    Psych: Good mood and bright affect    NEUROLOGICAL EXAMINATION:    Mental Status: Alert and oriented to person, place, and time    Cranial Nerves:    II, III, IV, VI: Visual acuity grossly intact.  Visual fields are normal.    Pupils are equal, round, and reactive to light and accommodation.    Extra-ocular movements are full and fluid. Fundoscopic exam was benign, no ptosis or nystagmus.    V-XII: Hearing is grossly intact. Facial features are symmetric, with normal sensation and strength. The palate rises symmetrically and the tongue protrudes midline. Sternocleidomastoids 5/5. Motor Examination: Normal tone, bulk, and strength, 5/5 muscle strength throughout. Coordination: Finger to nose was normal. No resting or intention tremor    Gait and Station: Steady while walking. Normal arm swing. No pronator drift. No muscle wasting or fasiculations noted. Reflexes: DTRs 2+ throughout. Results for orders placed or performed during the hospital encounter of 03/01/19   SAMPLES BEING HELD   Result Value Ref Range    SAMPLES BEING HELD 1RED,1LAV,1PST,1BLUE     COMMENT        Add-on orders for these samples will be processed based on acceptable specimen integrity and analyte stability, which may vary by analyte. Orders Placed This Encounter    fexofenadine HCl (ALLEGRA PO)     Sig: Take  by mouth.  diazePAM (VALIUM) 2 mg tablet     Sig: Take 1-2 tablets by mouth nightly. Dispense:  60 Tab     Refill:  5       1. Intractable migraine without status migrainosus, unspecified migraine type      Migraines. She will be transferred to Dr. Fe Lal for Botox to help with treatment. Keep all other medications for prevention and rescue. Refill Valium to help with spasms, sleep. Call with changes.                This note will not be viewable in [employer]

## 2019-11-26 NOTE — PROGRESS NOTES
botox follow up. Last headache was Sunday night. She did use the oxygen and too medication. Tiny bit better with botox.

## 2020-01-05 RX ORDER — LOSARTAN POTASSIUM 50 MG/1
TABLET ORAL
Qty: 90 TAB | Refills: 1 | Status: SHIPPED | OUTPATIENT
Start: 2020-01-05 | End: 2020-06-23 | Stop reason: SDUPTHER

## 2020-02-03 ENCOUNTER — TELEPHONE (OUTPATIENT)
Dept: FAMILY MEDICINE CLINIC | Age: 60
End: 2020-02-03

## 2020-02-03 NOTE — TELEPHONE ENCOUNTER
Call transferred from [facility]:        Spoke with the patient, Patient stated her blood sugar is 140/90. Patient has appointment on 2/4/2020.            Best callback:[phone]  LOV:3/13/2019

## 2020-02-03 NOTE — TELEPHONE ENCOUNTER
Patient with headaches off and on.  Has been monitoring bp  Denies chest pain   Will keep appt in am

## 2020-02-04 ENCOUNTER — OFFICE VISIT (OUTPATIENT)
Dept: FAMILY MEDICINE CLINIC | Age: 60
End: 2020-02-04

## 2020-02-04 VITALS
SYSTOLIC BLOOD PRESSURE: 118 MMHG | BODY MASS INDEX: 19.89 KG/M2 | OXYGEN SATURATION: 99 % | RESPIRATION RATE: 18 BRPM | WEIGHT: 119.4 LBS | HEART RATE: 86 BPM | DIASTOLIC BLOOD PRESSURE: 80 MMHG | TEMPERATURE: 97.8 F | HEIGHT: 65 IN

## 2020-02-04 DIAGNOSIS — G44.009 CLUSTER HEADACHE, NOT INTRACTABLE, UNSPECIFIED CHRONICITY PATTERN: ICD-10-CM

## 2020-02-04 DIAGNOSIS — Z12.31 VISIT FOR SCREENING MAMMOGRAM: ICD-10-CM

## 2020-02-04 DIAGNOSIS — I10 ESSENTIAL HYPERTENSION WITH GOAL BLOOD PRESSURE LESS THAN 140/90: Primary | ICD-10-CM

## 2020-02-04 DIAGNOSIS — R19.5 HEME + STOOL: ICD-10-CM

## 2020-02-04 NOTE — PROGRESS NOTES
Chief Complaint   Patient presents with    Hypertension     High B/p     1. Have you been to the ER, urgent care clinic since your last visit? Hospitalized since your last visit? No    2. Have you seen or consulted any other health care providers outside of the [address] [place] [address] since your last visit? Include any pap smears or colon screening.  Yes Where: Dr. Yousuf Olivas 1/6/2020 Headache Specialist

## 2020-02-04 NOTE — PROGRESS NOTES
HISTORY OF PRESENT ILLNESS  Frankey Cancel is a 62 y.o. female. HPI: Following up on chronic problems, patient has history of hypertension, borderline hyperlipidemia, and migraine headache, taking medication as prescribed, no medication side effects reported. For migraine headache she is followed by a neurologist  She is due for mammogram, refusing pap and colonoscopy   Past Medical History:   Diagnosis Date    Abnormal Pap smear     h/o    Allergic rhinitis due to other allergen     Arrhythmia     PAC'S - CHAMOMILLE TEA CAUSES    Esophageal reflux     Herpes zoster     Hypertension     Mixed hyperlipidemia     Nausea & vomiting     Osteopenia     Other atopic dermatitis and related conditions     Shingles rash 10/2015    small spot on left shoulder    Unspecified spontaneous  without mention of complication     X2     Past Surgical History:   Procedure Laterality Date    COLPOSCOPY  31 y/o    with cryo    DILATION AND CURETTAGE      HX COLONOSCOPY      HX WRIST FRACTURE TX Right     WRIST REPAIR   No Known Allergies    Current Outpatient Medications:     losartan (COZAAR) 50 mg tablet, TAKE 1 TABLET BY MOUTH EVERY DAY, Disp: 90 Tab, Rfl: 1    fexofenadine HCl (ALLEGRA PO), Take  by mouth., Disp: , Rfl:     verapamil ER (VERELAN) 120 mg ER capsule, TAKE 1 CAPSULE BY MOUTH EVERY DAY, Disp: 90 Cap, Rfl: 1    OTHER, , Disp: , Rfl:     cholecalciferol, vitamin D3, 2,000 unit tab, Take 2,000 Units by mouth daily. , Disp: , Rfl:     B.infantis-B.ani-B.long-B.bifi (PROBIOTIC 4X) 10-15 mg TbEC, Take 1 Tab by mouth., Disp: , Rfl:     CALCIUM CARBONATE/VITAMIN D3 (CALCIUM + D PO), Take 1 Tab by mouth daily. Total 770 mg daily calcium and 1000 units vitamin d , Disp: , Rfl:     MULTIVITAMIN PO, Take 1 Tab by mouth daily. , Disp: , Rfl:     dextrose 5% solp 250 mL with CARBOplatin 10 mg/mL soln, 1,500 mg, PO, bid, Gummies, 0 Refills, Disp: , Rfl:     diazePAM (VALIUM) 2 mg tablet, Take 1-2 tablets by mouth nightly., Disp: 60 Tab, Rfl: 5    OTHER, Rx Portable Oxygen Tank. Dx: Cluster Headache 346.2. Instructions: For cluster headache not responding to medications, turn on to 8-12 Liters per minute and inhale oxygen via mask for 15 minutes. Dispense one cannister with concentrator, refill x 3., Disp: 1 Container, Rfl: 5    onabotulinumtoxinA (BOTOX) 200 unit injection, Inject 155 units into face, head, neck , shoulders every 3 months for FDA indicated and approved migraine prevention, Disp: 1 Vial, Rfl: 5    acetaminophen (TYLENOL EXTRA STRENGTH) 500 mg tablet, Take  by mouth every six (6) hours as needed for Pain., Disp: , Rfl:     aspirin-acetaminophen-caffeine (EXCEDRIN ES) 250-250-65 mg per tablet, Take 1 Tab by mouth., Disp: , Rfl:     LYSINE PO, Take  by mouth daily. , Disp: , Rfl:   Review of Systems   Constitutional: Negative. Respiratory: Negative. Cardiovascular: Negative. Gastrointestinal: Negative. Blood pressure 118/80, pulse 86, temperature 97.8 °F (36.6 °C), temperature source Oral, resp. rate 18, height 5' 5\" (1.651 m), weight 119 lb 6.4 oz (54.2 kg), SpO2 99 %. Body mass index is 19.87 kg/m². Physical Exam  Constitutional:       Appearance: Normal appearance. She is normal weight. HENT:      Mouth/Throat:      Mouth: Mucous membranes are moist.      Pharynx: Oropharynx is clear. Neck:      Musculoskeletal: Normal range of motion. Cardiovascular:      Rate and Rhythm: Normal rate and regular rhythm. Pulses: Normal pulses. Heart sounds: Normal heart sounds. No murmur. Pulmonary:      Effort: Pulmonary effort is normal.      Breath sounds: Normal breath sounds. Abdominal:      General: Abdomen is flat. Bowel sounds are normal.      Palpations: Abdomen is soft. Neurological:      Mental Status: She is alert. ASSESSMENT and PLAN    ICD-10-CM ICD-9-CM    1.  Essential hypertension with goal blood pressure less than 140/90 I10 401.9 LIPID PANEL      METABOLIC PANEL, COMPREHENSIVE      CBC W/O DIFF   2. Cluster headache, not intractable, unspecified chronicity pattern G44.009 339.00    3. Visit for screening mammogram Z12.31 V76.12 [facility] MAMMO BI SCREENING INCL CAD   4.  Heme + stool R19.5 792.1 OCCULT BLOOD IMMUNOASSAY,DIAGNOSTIC   Follow up in six months for general medical exam  Pt was given an after visit summary which includes diagnosis, current medicines and vital and voiced understanding of treatment plan

## 2020-02-05 ENCOUNTER — TELEPHONE (OUTPATIENT)
Dept: FAMILY MEDICINE CLINIC | Age: 60
End: 2020-02-05

## 2020-02-05 LAB
ALBUMIN SERPL-MCNC: 4.6 G/DL (ref 3.8–4.8)
ALBUMIN/GLOB SERPL: 2.1 {RATIO} (ref 1.2–2.2)
ALP SERPL-CCNC: 64 IU/L (ref 39–117)
ALT SERPL-CCNC: 16 IU/L (ref 0–32)
AST SERPL-CCNC: 18 IU/L (ref 0–40)
BILIRUB SERPL-MCNC: 0.3 MG/DL (ref 0–1.2)
BUN SERPL-MCNC: 15 MG/DL (ref 8–27)
BUN/CREAT SERPL: 19 (ref 12–28)
CALCIUM SERPL-MCNC: 9.4 MG/DL (ref 8.7–10.3)
CHLORIDE SERPL-SCNC: 100 MMOL/L (ref 96–106)
CHOLEST SERPL-MCNC: 269 MG/DL (ref 100–199)
CO2 SERPL-SCNC: 25 MMOL/L (ref 20–29)
CREAT SERPL-MCNC: 0.8 MG/DL (ref 0.57–1)
ERYTHROCYTE [DISTWIDTH] IN BLOOD BY AUTOMATED COUNT: 12 % (ref 11.7–15.4)
GLOBULIN SER CALC-MCNC: 2.2 G/DL (ref 1.5–4.5)
GLUCOSE SERPL-MCNC: 82 MG/DL (ref 65–99)
HCT VFR BLD AUTO: 42.3 % (ref 34–46.6)
HDLC SERPL-MCNC: 74 MG/DL
HGB BLD-MCNC: 13.9 G/DL (ref 11.1–15.9)
INTERPRETATION, 910389: NORMAL
LDLC SERPL CALC-MCNC: 178 MG/DL (ref 0–99)
MCH RBC QN AUTO: 29.5 PG (ref 26.6–33)
MCHC RBC AUTO-ENTMCNC: 32.9 G/DL (ref 31.5–35.7)
MCV RBC AUTO: 90 FL (ref 79–97)
PLATELET # BLD AUTO: 224 X10E3/UL (ref 150–450)
POTASSIUM SERPL-SCNC: 4.2 MMOL/L (ref 3.5–5.2)
PROT SERPL-MCNC: 6.8 G/DL (ref 6–8.5)
RBC # BLD AUTO: 4.71 X10E6/UL (ref 3.77–5.28)
SODIUM SERPL-SCNC: 141 MMOL/L (ref 134–144)
TRIGL SERPL-MCNC: 86 MG/DL (ref 0–149)
VLDLC SERPL CALC-MCNC: 17 MG/DL (ref 5–40)
WBC # BLD AUTO: 5.5 X10E3/UL (ref 3.4–10.8)

## 2020-02-05 RX ORDER — ROSUVASTATIN CALCIUM 5 MG/1
5 TABLET, COATED ORAL
Qty: 30 TAB | Refills: 2 | Status: SHIPPED | OUTPATIENT
Start: 2020-02-05 | End: 2020-03-12

## 2020-02-05 NOTE — TELEPHONE ENCOUNTER
----- Message from Samia Warner sent at 2/5/2020  2:24 PM EST -----  Regarding: ARNOL Hansen/Telephone  Contact: [phone]  Caller's first and last name and relationship (if not the patient): n/a  Best contact number(s): [phone]  Whose call is being returned: n/a  Details to clarify the request: Pt missed call and needs a call back

## 2020-02-05 NOTE — TELEPHONE ENCOUNTER
Called, spoke to pt. Two pt identifiers confirmed. Writer informed patient of recommendation and abnormal labs. At first patient did not want to take new med but explained to her per NP that the med is a low dose and that she is also HTN and that excising and diet change is not enough. Also informed patient that new appt and lab work will be mailed to her.

## 2020-02-07 ENCOUNTER — TELEPHONE (OUTPATIENT)
Dept: FAMILY MEDICINE CLINIC | Age: 60
End: 2020-02-07

## 2020-02-07 NOTE — TELEPHONE ENCOUNTER
pt. is calling requesting a call back in regards to get the rest of her lab results.      best contact# [phone]

## 2020-02-10 ENCOUNTER — TELEPHONE (OUTPATIENT)
Dept: FAMILY MEDICINE CLINIC | Age: 60
End: 2020-02-10

## 2020-02-10 NOTE — TELEPHONE ENCOUNTER
Called, spoke to pt. Two pt identifiers confirmed. Writer informed patient that labs was mailed to her and she states she has seen them in on My Chart also. Pt verbalized understanding of information discussed w/ no further questions at this time.

## 2020-03-12 ENCOUNTER — HOSPITAL ENCOUNTER (EMERGENCY)
Age: 60
Discharge: HOME | End: 2020-03-12
Attending: EMERGENCY MEDICINE
Payer: COMMERCIAL

## 2020-03-12 VITALS
HEART RATE: 76 BPM | HEIGHT: 66 IN | SYSTOLIC BLOOD PRESSURE: 167 MMHG | RESPIRATION RATE: 14 BRPM | DIASTOLIC BLOOD PRESSURE: 92 MMHG | WEIGHT: 120 LBS | BODY MASS INDEX: 19.29 KG/M2 | TEMPERATURE: 97.5 F | OXYGEN SATURATION: 97 %

## 2020-03-12 DIAGNOSIS — R51.9 CHRONIC NONINTRACTABLE HEADACHE, UNSPECIFIED HEADACHE TYPE: Primary | ICD-10-CM

## 2020-03-12 DIAGNOSIS — G89.29 CHRONIC NONINTRACTABLE HEADACHE, UNSPECIFIED HEADACHE TYPE: Primary | ICD-10-CM

## 2020-03-12 PROCEDURE — 99282 EMERGENCY DEPT VISIT SF MDM: CPT

## 2020-03-12 RX ORDER — KETOROLAC TROMETHAMINE 10 MG/1
10 TABLET, FILM COATED ORAL
Qty: 20 TAB | Refills: 0 | Status: SHIPPED | OUTPATIENT
Start: 2020-03-12 | End: 2020-03-17

## 2020-03-12 RX ORDER — METOCLOPRAMIDE 10 MG/1
10 TABLET ORAL
Qty: 12 TAB | Refills: 0 | Status: SHIPPED | OUTPATIENT
Start: 2020-03-12 | End: 2020-03-22

## 2020-03-12 NOTE — ED NOTES
The patient was discharged by   Dr. Sy [facility]. Two paper prescriptions given to patient. Patient ambulatory to discharge.

## 2020-03-12 NOTE — ED PROVIDER NOTES
58-year-old female with a history of chronic headaches presents with headache that has been going on for a year. She states that it is been intermittent. It is better now is 1 out of 10. She was directed by her neurologist at [facility] to come to the emergency department. She came in because she wanted to follow instructions. She describes her head ache as aching. She denies any fever, stiff neck, weakness, numbness, difficulty speaking, difficulty walking. She has no other complaints. The history is provided by the patient. Migraine    This is a chronic problem. Pertinent negatives include no fever and no shortness of breath.         Past Medical History:   Diagnosis Date    Abnormal Pap smear     h/o    Allergic rhinitis due to other allergen     Arrhythmia     PAC'S - CHAMOMILLE TEA CAUSES    Esophageal reflux     Herpes zoster     Hypertension     Mixed hyperlipidemia     Nausea & vomiting     Osteopenia     Other atopic dermatitis and related conditions     Shingles rash 10/2015    small spot on left shoulder    Unspecified spontaneous  without mention of complication     X2       Past Surgical History:   Procedure Laterality Date    COLPOSCOPY  31 y/o    with cryo    DILATION AND CURETTAGE      HX COLONOSCOPY      HX WRIST FRACTURE TX Right     WRIST REPAIR         Family History:   Problem Relation Age of Onset    Hypertension Mother     Heart Disease Mother         CHF    Cancer Mother         kidney    Heart Attack Father     Coronary Artery Disease Father         with pacemaker    Asthma Father     Heart Disease Father         pacemaker    Stroke Maternal Grandmother     Heart Attack Paternal Grandfather     Anesth Problems Neg Hx        Social History     Socioeconomic History    Marital status:      Spouse name: Zeenat Albert Number of children: 2    Years of education: Not on file    Highest education level: Not on file   Occupational History    Occupation: Nurse     Employer: [employer] ([id number])   Social Needs    Financial resource strain: Not on file    Food insecurity     Worry: Not on file     Inability: Not on file    Transportation needs     Medical: Not on file     Non-medical: Not on file   Tobacco Use    Smoking status: Never Smoker    Smokeless tobacco: Never Used   Substance and Sexual Activity    Alcohol use: No     Alcohol/week: 0.8 standard drinks     Types: 1 Glasses of wine per week    Drug use: No    Sexual activity: Yes     Partners: Male   Lifestyle    Physical activity     Days per week: Not on file     Minutes per session: Not on file    Stress: Not on file   Relationships    Social connections     Talks on phone: Not on file     Gets together: Not on file     Attends [employer] service: Not on file     Active member of club or organization: Not on file     Attends meetings of clubs or organizations: Not on file     Relationship status: Not on file    Intimate partner violence     Fear of current or ex partner: Not on file     Emotionally abused: Not on file     Physically abused: Not on file     Forced sexual activity: Not on file   Other Topics Concern    Not on file   Social History Narrative    Not on file         ALLERGIES: Patient has no known allergies. Review of Systems   Constitutional: Negative for chills and fever. HENT: Negative for ear pain and sore throat. Eyes: Negative for pain. Respiratory: Negative for chest tightness and shortness of breath. Cardiovascular: Negative for chest pain. Gastrointestinal: Negative for abdominal pain. Genitourinary: Negative for flank pain. Musculoskeletal: Negative for back pain. Skin: Negative for rash. Neurological: Positive for headaches. All other systems reviewed and are negative.       Vitals:    03/12/20 1019   BP: (!) 167/92   Pulse: 76   Resp: 14   Temp: 97.5 °F (36.4 °C)   SpO2: 97%   Weight: 54.4 kg (120 lb)   Height: 5' 6\" (1.676 m) Physical Exam  Vitals signs and nursing note reviewed. Constitutional:       General: She is not in acute distress. HENT:      Head: Normocephalic and atraumatic. Mouth/Throat:      Mouth: Mucous membranes are moist.   Eyes:      General: No scleral icterus. Conjunctiva/sclera: Conjunctivae normal.      Pupils: Pupils are equal, round, and reactive to light. Neck:      Musculoskeletal: Neck supple. Trachea: No tracheal deviation. Cardiovascular:      Rate and Rhythm: Normal rate and regular rhythm. Pulmonary:      Effort: Pulmonary effort is normal. No respiratory distress. Breath sounds: No wheezing or rales. Abdominal:      General: There is no distension. Palpations: Abdomen is soft. Tenderness: There is no abdominal tenderness. Genitourinary:     Comments: deferred  Musculoskeletal:         General: No deformity. Skin:     General: Skin is warm and dry. Neurological:      General: No focal deficit present. Mental Status: She is alert. Comments: Alert and Oriented x3, Cranial nerves 2-12 intact, normal motor and sensation, negative Romberg, no pronator drift, normal finger to nose   Psychiatric:         Mood and Affect: Mood normal.          [facility]       Procedures      1046  Patient re-evaluated. All questions answered. Patient appropriate for discharge. Given return precautions and follow up instructions. LABORATORY TESTS:  Labs Reviewed - No data to display    IMAGING RESULTS:  No orders to display       MEDICATIONS GIVEN:  Medications - No data to display    IMPRESSION:  1. Chronic nonintractable headache, unspecified headache type        PLAN:  1. Discharge Medication List as of 3/12/2020 10:38 AM      START taking these medications    Details   ketorolac (TORADOL) 10 mg tablet Take 1 Tab by mouth every six (6) hours as needed for Pain for up to 5 days. , Print, Disp-20 Tab, R-0      metoclopramide HCl (Reglan) 10 mg tablet Take 1 Tab by mouth every six (6) hours as needed for Nausea for up to 10 days. , Print, Disp-12 Tab, R-0         CONTINUE these medications which have NOT CHANGED    Details   dextrose 5% solp 250 mL with CARBOplatin 10 mg/mL soln 1,500 mg, PO, bid, Gummies, 0 Refills, Historical Med      losartan (COZAAR) 50 mg tablet TAKE 1 TABLET BY MOUTH EVERY DAY, Normal, Disp-90 Tab, R-1      diazePAM (VALIUM) 2 mg tablet Take 1-2 tablets by mouth nightly., Normal, Disp-60 Tab, R-5      verapamil ER (VERELAN) 120 mg ER capsule TAKE 1 CAPSULE BY MOUTH EVERY DAY, Normal, Disp-90 Cap, R-1      !! OTHER Rx Portable Oxygen Tank. Dx: Cluster Headache 346.2. Instructions: For cluster headache not responding to medications, turn on to 8-12 Liters per minute and inhale oxygen via mask for 15 minutes. Dispense one cannister with concentrator, refill x 3. , Print, Disp-1 Container, R-5      onabotulinumtoxinA (BOTOX) 200 unit injection Inject 155 units into face, head, neck , shoulders every 3 months for FDA indicated and approved migraine prevention, Print, Disp-1 Vial, R-5      !! OTHER Historical Med      acetaminophen (TYLENOL EXTRA STRENGTH) 500 mg tablet Take  by mouth every six (6) hours as needed for Pain., Historical Med      LYSINE PO Take  by mouth daily. , Historical Med      cholecalciferol, vitamin D3, 2,000 unit tab Take 2,000 Units by mouth daily. , Historical Med      B.infantis-B.ani-B.long-B.bifi (PROBIOTIC 4X) 10-15 mg TbEC Take 1 Tab by mouth., Historical Med      CALCIUM CARBONATE/VITAMIN D3 (CALCIUM + D PO) Take 1 Tab by mouth daily. Total 770 mg daily calcium and 1000 units vitamin d , Historical Med      MULTIVITAMIN PO Take 1 Tab by mouth daily. , Historical Med      fexofenadine HCl (ALLEGRA PO) Take  by mouth., Historical Med      aspirin-acetaminophen-caffeine (EXCEDRIN ES) 250-250-65 mg per tablet Take 1 Tab by mouth., Historical Med       !! - Potential duplicate medications found. Please discuss with provider. 2.   Follow-up Information     Follow up With Specialties Details Why Contact Info    Your Neurologist  Schedule an appointment as soon as possible for a visit      [facility] EMERGENCY DEPT Emergency Medicine  If symptoms worsen or new concerns [address] [phone]        3. Return to ED for new or worsening symptoms       Kimberly Olivares.  Kari Kelley MD

## 2020-03-12 NOTE — ED TRIAGE NOTES
C/o headache for one year. Pain now 1/10. Started a medrol dose pack yesterday. Is seeing a neurologist and has had a CT and a MRI. \"I cannot get in to see my headache doctor and I don't want to go to [facility] today. \"

## 2020-03-24 ENCOUNTER — CLINICAL SUPPORT (OUTPATIENT)
Dept: NEUROLOGY | Age: 60
End: 2020-03-24

## 2020-03-24 VITALS
SYSTOLIC BLOOD PRESSURE: 126 MMHG | OXYGEN SATURATION: 98 % | WEIGHT: 115 LBS | HEART RATE: 83 BPM | BODY MASS INDEX: 18.48 KG/M2 | DIASTOLIC BLOOD PRESSURE: 88 MMHG | HEIGHT: 66 IN

## 2020-03-24 DIAGNOSIS — G43.919 INTRACTABLE MIGRAINE WITHOUT STATUS MIGRAINOSUS, UNSPECIFIED MIGRAINE TYPE: Primary | ICD-10-CM

## 2020-03-24 NOTE — LETTER
3/24/2020 8:29 AM 
 
Ms. Jori Min [address] [place] [address] Drive [id number] To Whom It May Concern,  
 
Mrs. Jori Min is currently under the care of [address] [place] [address] Neurology. She was seen in the office today on 3/24/2020 and should be excused from work on 3/23/2020 and 3/24/2020. She is to return to work on 3/25/2020 and should be allowed to go home if symptoms arise. If you have any further questions please contact our office. Sincerely, Sathish Robb NP

## 2020-03-24 NOTE — PROGRESS NOTES
Fe Resendez is a 62 y.o. female who presents with the following  Chief Complaint   Patient presents with    Headache       HPI        Patient comes back for cluster migraines. Pain still located in  the front of her head, also behind the left eye and left side of the head. Sharp, stabbing. Still waking her up out of sleep. Using ice cap, oxygen. Never tried Lesvia Rand for PRN rescue. She was into an overuse headache with tylenol, OTC medications. Has not been able to get into work recently due to the migraines. Did go to ER recently. Was also given a steroid pack by  Kendrick.   Doing natural medications also with sister in law.      On Botox with Dr. Yousuf Olivas at [facility]. Next week is # 3. Keeping Verapamil.      As a full recap from previous visits,   Patient comes back in for a follow up for cluster migraine, headaches, migraines.   She continues to have daily migraines usually coming on about night. She states they last about 6 hours or longer. She uses oxygen to help with rescue. She has failed multiple rescues.   She is unable to sleep and can not function well at work. Aimovig hasn't helped yet. Tried Topamax made her feel bad. Also on Verapamil and Cozaar with no results. Failed Celexa also.      Zomig tablets, percocet, zembrace have not really helped. She states her headaches come on strong. Tired Zembrace and this did not help. She has tried this multiple times without relief. She has spells like this multiple times a year. CT was normal.   She has tried OTC medications without relief.         She states the pain is a 10/10 when it comes on. In the entire head. She has light sensitivity, dizziness, and nausea with this. She has had to leave work multiple times due to this. She is on BP medication. Tried Celexa also.              No Known Allergies    Current Outpatient Medications   Medication Sig    ubrogepant (Ubrelvy) 50 mg tablet 1 at HA onset and repeat in 2 hours if needed.   Max 2 in 24 hours    dextrose 5% solp 250 mL with CARBOplatin 10 mg/mL soln 1,500 mg, PO, bid, Gummies, 0 Refills    losartan (COZAAR) 50 mg tablet TAKE 1 TABLET BY MOUTH EVERY DAY    fexofenadine HCl (ALLEGRA PO) Take  by mouth.  diazePAM (VALIUM) 2 mg tablet Take 1-2 tablets by mouth nightly.  verapamil ER (VERELAN) 120 mg ER capsule TAKE 1 CAPSULE BY MOUTH EVERY DAY    OTHER Rx Portable Oxygen Tank. Dx: Cluster Headache 346.2. Instructions: For cluster headache not responding to medications, turn on to 8-12 Liters per minute and inhale oxygen via mask for 15 minutes. Dispense one cannister with concentrator, refill x 3.    onabotulinumtoxinA (BOTOX) 200 unit injection Inject 155 units into face, head, neck , shoulders every 3 months for FDA indicated and approved migraine prevention    OTHER     LYSINE PO Take  by mouth daily.  cholecalciferol, vitamin D3, 2,000 unit tab Take 2,000 Units by mouth daily.  B.infantis-B.ani-B.long-B.bifi (PROBIOTIC 4X) 10-15 mg TbEC Take 1 Tab by mouth.  CALCIUM CARBONATE/VITAMIN D3 (CALCIUM + D PO) Take 1 Tab by mouth daily. Total 770 mg daily calcium and 1000 units vitamin d     MULTIVITAMIN PO Take 1 Tab by mouth daily. No current facility-administered medications for this visit.         Social History     Tobacco Use   Smoking Status Never Smoker   Smokeless Tobacco Never Used       Past Medical History:   Diagnosis Date    Abnormal Pap smear     h/o    Allergic rhinitis due to other allergen     Arrhythmia     PAC'S - CHAMOMILLE TEA CAUSES    Esophageal reflux     Herpes zoster     Hypertension     Mixed hyperlipidemia     Nausea & vomiting     Osteopenia     Other atopic dermatitis and related conditions     Shingles rash 10/2015    small spot on left shoulder    Unspecified spontaneous  without mention of complication     X2       Past Surgical History:   Procedure Laterality Date    COLPOSCOPY  29 y/o    with cryo    DILATION AND CURETTAGE      HX COLONOSCOPY  2001    HX WRIST FRACTURE TX Right     WRIST REPAIR       Family History   Problem Relation Age of Onset    Hypertension Mother     Heart Disease Mother         CHF    Cancer Mother         kidney    Heart Attack Father     Coronary Artery Disease Father         with pacemaker    Asthma Father     Heart Disease Father         pacemaker    Stroke Maternal Grandmother     Heart Attack Paternal Grandfather     Anesth Problems Neg Hx        Social History     Socioeconomic History    Marital status:      Spouse name: Sammie Mulligan Number of children: 2    Years of education: Not on file    Highest education level: Not on file   Occupational History    Occupation: Nurse     Employer: [id number]   Tobacco Use    Smoking status: Never Smoker    Smokeless tobacco: Never Used   Substance and Sexual Activity    Alcohol use: No     Alcohol/week: 0.8 standard drinks     Types: 1 Glasses of wine per week    Drug use: No    Sexual activity: Yes     Partners: Male       Review of Systems   Eyes: Positive for blurred vision, double vision and photophobia. Respiratory: Negative for shortness of breath and wheezing. Gastrointestinal: Positive for nausea and vomiting. Neurological: Positive for dizziness and headaches. Negative for tingling, tremors and sensory change. Remainder of comprehensive review is negative. Physical Exam :    Visit Vitals  /88   Pulse 83   Ht 5' 6\" (1.676 m)   Wt 52.2 kg (115 lb)   SpO2 98%   BMI 18.56 kg/m²       General: Well defined, nourished, and groomed individual in no acute distress.    Neck: Supple, nontender, no bruits, no pain with resistance to active range of motion.    Heart: Regular rate and rhythm, no murmurs, rub, or gallop. Normal S1S2.   Lungs: Clear to auscultation bilaterally with equal chest expansion, no cough, no wheeze  Musculoskeletal: Extremities revealed no edema and had full range of motion of joints.    Psych: Good mood and bright affect    NEUROLOGICAL EXAMINATION:    Mental Status: Alert and oriented to person, place, and time    Cranial Nerves:    II, III, IV, VI: Visual acuity grossly intact. Visual fields are normal.    Pupils are equal, round, and reactive to light and accommodation.    Extra-ocular movements are full and fluid. Fundoscopic exam was benign, no ptosis or nystagmus.    V-XII: Hearing is grossly intact. Facial features are symmetric, with normal sensation and strength. The palate rises symmetrically and the tongue protrudes midline. Sternocleidomastoids 5/5. Motor Examination: Normal tone, bulk, and strength, 5/5 muscle strength throughout. Coordination: Finger to nose was normal. No resting or intention tremor    Gait and Station: Steady while walking. Normal arm swing. No pronator drift. No muscle wasting or fasiculations noted. Reflexes: DTRs 2+ throughout. Results for orders placed or performed in visit on 02/04/20   LIPID PANEL   Result Value Ref Range    Cholesterol, total 269 (H) 100 - 199 mg/dL    Triglyceride 86 0 - 149 mg/dL    HDL Cholesterol 74 >39 mg/dL    VLDL, calculated 17 5 - 40 mg/dL    LDL, calculated 178 (H) 0 - 99 mg/dL   METABOLIC PANEL, COMPREHENSIVE   Result Value Ref Range    Glucose 82 65 - 99 mg/dL    BUN 15 8 - 27 mg/dL    Creatinine 0.80 0.57 - 1.00 mg/dL    GFR est non-AA 80 >59 mL/min/1.73    GFR est AA 92 >59 mL/min/1.73    BUN/Creatinine ratio 19 12 - 28    Sodium 141 134 - 144 mmol/L    Potassium 4.2 3.5 - 5.2 mmol/L    Chloride 100 96 - 106 mmol/L    CO2 25 20 - 29 mmol/L    Calcium 9.4 8.7 - 10.3 mg/dL    Protein, total 6.8 6.0 - 8.5 g/dL    Albumin 4.6 3.8 - 4.8 g/dL    GLOBULIN, TOTAL 2.2 1.5 - 4.5 g/dL    A-G Ratio 2.1 1.2 - 2.2    Bilirubin, total 0.3 0.0 - 1.2 mg/dL    Alk.  phosphatase 64 39 - 117 IU/L    AST (SGOT) 18 0 - 40 IU/L    ALT (SGPT) 16 0 - 32 IU/L   CBC W/O DIFF   Result Value Ref Range    WBC 5.5 3.4 - 10.8 x10E3/uL    RBC 4.71 3.77 - 5.28 x10E6/uL    HGB 13.9 11.1 - 15.9 g/dL    HCT 42.3 34.0 - 46.6 %    MCV 90 79 - 97 fL    MCH 29.5 26.6 - 33.0 pg    MCHC 32.9 31.5 - 35.7 g/dL    RDW 12.0 11.7 - 15.4 %    PLATELET 298 058 - 984 x10E3/uL   CVD REPORT   Result Value Ref Range    INTERPRETATION Note        Orders Placed This Encounter    ubrogepant (Ubrelvy) 50 mg tablet     Si at HA onset and repeat in 2 hours if needed. Max 2 in 24 hours     Dispense:  10 Tab     Refill:  5     BIN: [id number]  PCN:[id number]  GRP: [id number]   Id#: [id number]  Please use these card numbers. 1. Intractable migraine without status migrainosus, unspecified migraine type          Migraines continue to be up and down. She is also with Dr. Macye Calix for BOtox at [address] [place] [address]. Did just come off medrol dosepack. She did have to go to the ER also recently for infusion. She has not tried Ajovy so we can consider this. Keep Ice cap, oxygen for PRN rescue. Try Ubrelvy 50 mg tablets for PRN rescue of migraine to avoid overuse, rebound. She will get a not for work. She has no other concerns at this time.            This note will not be viewable in [facility]

## 2020-04-10 ENCOUNTER — TELEPHONE (OUTPATIENT)
Dept: NEUROLOGY | Age: 60
End: 2020-04-10

## 2020-04-10 NOTE — TELEPHONE ENCOUNTER
Pt state she needs a letter stating she needs to only work 4 hour shifts due to her medical conditions regarding her headaches.

## 2020-05-15 DIAGNOSIS — G44.009 MIGRAINE-CLUSTER HEADACHE SYNDROME: ICD-10-CM

## 2020-05-18 RX ORDER — VERAPAMIL HYDROCHLORIDE 120 MG/1
CAPSULE, EXTENDED RELEASE ORAL
Qty: 90 CAP | Refills: 1 | Status: SHIPPED | OUTPATIENT
Start: 2020-05-18 | End: 2020-11-19

## 2020-06-23 NOTE — TELEPHONE ENCOUNTER
Contacted pharmacy to check availability and they have 50mg today. Thanks, Abril    Last Visit: 2/4/20  ARNOL Hansen  Next Appointment: 8/5/20  ARNOL Hansen  Previous Refill Encounter(s): 1/5/20  90 + 1 refill    Requested Prescriptions     Pending Prescriptions Disp Refills    losartan (COZAAR) 50 mg tablet 90 Tab 1     Sig: Take 1 Tab by mouth daily.

## 2020-06-24 ENCOUNTER — OFFICE VISIT (OUTPATIENT)
Dept: NEUROLOGY | Age: 60
End: 2020-06-24

## 2020-06-24 VITALS
OXYGEN SATURATION: 98 % | DIASTOLIC BLOOD PRESSURE: 90 MMHG | HEIGHT: 66 IN | HEART RATE: 84 BPM | BODY MASS INDEX: 18.8 KG/M2 | TEMPERATURE: 98.2 F | WEIGHT: 117 LBS | SYSTOLIC BLOOD PRESSURE: 132 MMHG

## 2020-06-24 DIAGNOSIS — G43.919 INTRACTABLE MIGRAINE WITHOUT STATUS MIGRAINOSUS, UNSPECIFIED MIGRAINE TYPE: Primary | ICD-10-CM

## 2020-06-24 NOTE — PROGRESS NOTES
Casey Fuentes is a 62 y.o. female who presents with the following  Chief Complaint   Patient presents with    Follow-up    Migraine       HPI    Botox through DR. Marks. Doing well. Tomorrow is next botox. She has only had to use rescues 1-2 times since last visit. Using OTC. Keeping Verapamil. Off all other medications. Feeling good moving forward to working more full time.           As a full recap from previous visits,   Patient comes back in for a follow up for cluster migraine, headaches, migraines.   She continues to have daily migraines usually coming on about night. She states they last about 6 hours or longer. She uses oxygen to help with rescue. She has failed multiple rescues.   She is unable to sleep and can not function well at work. Aimovig hasn't helped yet. Tried Topamax made her feel bad. Also on Verapamil and Cozaar with no results. Failed Celexa also.      Zomig tablets, percocet, zembrace have not really helped. She states her headaches come on strong. Tired Zembrace and this did not help. She has tried this multiple times without relief. She has spells like this multiple times a year. CT was normal.   She has tried OTC medications without relief.         She states the pain is a 10/10 when it comes on. In the entire head. She has light sensitivity, dizziness, and nausea with this. She has had to leave work multiple times due to this. She is on BP medication. Tried Celexa also.                       No Known Allergies    Current Outpatient Medications   Medication Sig    verapamil ER (VERELAN) 120 mg ER capsule TAKE 1 CAPSULE BY MOUTH EVERY DAY    dextrose 5% solp 250 mL with CARBOplatin 10 mg/mL soln 1,500 mg, PO, bid, Gummies, 0 Refills    losartan (COZAAR) 50 mg tablet TAKE 1 TABLET BY MOUTH EVERY DAY    fexofenadine HCl (ALLEGRA PO) Take  by mouth.  OTHER Rx Portable Oxygen Tank. Dx: Cluster Headache 346.2. Instructions:  For cluster headache not responding to medications, turn on to 8-12 Liters per minute and inhale oxygen via mask for 15 minutes. Dispense one cannister with concentrator, refill x 3.    onabotulinumtoxinA (BOTOX) 200 unit injection Inject 155 units into face, head, neck , shoulders every 3 months for FDA indicated and approved migraine prevention    OTHER     LYSINE PO Take  by mouth daily.  cholecalciferol, vitamin D3, 2,000 unit tab Take 2,000 Units by mouth daily.  B.infantis-B.ani-B.long-B.bifi (PROBIOTIC 4X) 10-15 mg TbEC Take 1 Tab by mouth.  CALCIUM CARBONATE/VITAMIN D3 (CALCIUM + D PO) Take 1 Tab by mouth daily. Total 770 mg daily calcium and 1000 units vitamin d     MULTIVITAMIN PO Take 1 Tab by mouth daily. No current facility-administered medications for this visit.         Social History     Tobacco Use   Smoking Status Never Smoker   Smokeless Tobacco Never Used       Past Medical History:   Diagnosis Date    Abnormal Pap smear     h/o    Allergic rhinitis due to other allergen     Arrhythmia     PAC'S - CHAMOMILLE TEA CAUSES    Esophageal reflux     Herpes zoster     Hypertension     Mixed hyperlipidemia     Nausea & vomiting     Osteopenia     Other atopic dermatitis and related conditions     Shingles rash 10/2015    small spot on left shoulder    Unspecified spontaneous  without mention of complication     X2       Past Surgical History:   Procedure Laterality Date    COLPOSCOPY  31 y/o    with cryo    DILATION AND CURETTAGE      HX COLONOSCOPY      HX WRIST FRACTURE TX Right     WRIST REPAIR       Family History   Problem Relation Age of Onset    Hypertension Mother     Heart Disease Mother         CHF    Cancer Mother         kidney    Heart Attack Father     Coronary Artery Disease Father         with pacemaker    Asthma Father     Heart Disease Father         pacemaker    Stroke Maternal Grandmother     Heart Attack Paternal Grandfather     Anesth Problems Neg Hx        Social History     Socioeconomic History    Marital status:      Spouse name: Anna Lock Number of children: 2    Years of education: Not on file    Highest education level: Not on file   Occupational History    Occupation: Nurse     Employer: [id number]   Tobacco Use    Smoking status: Never Smoker    Smokeless tobacco: Never Used   Substance and Sexual Activity    Alcohol use: No     Alcohol/week: 0.8 standard drinks     Types: 1 Glasses of wine per week    Drug use: No    Sexual activity: Yes     Partners: Male       Review of Systems   Eyes: Positive for blurred vision and photophobia. Negative for double vision. Gastrointestinal: Positive for nausea. Negative for vomiting. Neurological: Positive for headaches. Negative for dizziness, tingling and tremors. Remainder of comprehensive review is negative. Physical Exam :    Visit Vitals  /90   Pulse 84   Temp 98.2 °F (36.8 °C)   Ht 5' 6\" (1.676 m)   Wt 53.1 kg (117 lb)   SpO2 98%   BMI 18.88 kg/m²       General: Well defined, nourished, and groomed individual in no acute distress.    Neck: Supple, nontender, no bruits, no pain with resistance to active range of motion.    Heart: Regular rate and rhythm, no murmurs, rub, or gallop. Normal S1S2. Lungs: Clear to auscultation bilaterally with equal chest expansion, no cough, no wheeze  Musculoskeletal: Extremities revealed no edema and had full range of motion of joints.    Psych: Good mood and bright affect    NEUROLOGICAL EXAMINATION:    Mental Status: Alert and oriented to person, place, and time    Cranial Nerves:    II, III, IV, VI: Visual acuity grossly intact. Visual fields are normal.    Pupils are equal, round, and reactive to light and accommodation.    Extra-ocular movements are full and fluid. Fundoscopic exam was benign, no ptosis or nystagmus.    V-XII: Hearing is grossly intact. Facial features are symmetric, with normal sensation and strength.  The palate rises symmetrically and the tongue protrudes midline. Sternocleidomastoids 5/5. Motor Examination: Normal tone, bulk, and strength, 5/5 muscle strength throughout. Coordination: Finger to nose was normal. No resting or intention tremor    Gait and Station: Steady while walking. Normal arm swing. No pronator drift. No muscle wasting or fasiculations noted. Reflexes: DTRs 2+ throughout. Results for orders placed or performed in visit on 02/04/20   LIPID PANEL   Result Value Ref Range    Cholesterol, total 269 (H) 100 - 199 mg/dL    Triglyceride 86 0 - 149 mg/dL    HDL Cholesterol 74 >39 mg/dL    VLDL, calculated 17 5 - 40 mg/dL    LDL, calculated 178 (H) 0 - 99 mg/dL   METABOLIC PANEL, COMPREHENSIVE   Result Value Ref Range    Glucose 82 65 - 99 mg/dL    BUN 15 8 - 27 mg/dL    Creatinine 0.80 0.57 - 1.00 mg/dL    GFR est non-AA 80 >59 mL/min/1.73    GFR est AA 92 >59 mL/min/1.73    BUN/Creatinine ratio 19 12 - 28    Sodium 141 134 - 144 mmol/L    Potassium 4.2 3.5 - 5.2 mmol/L    Chloride 100 96 - 106 mmol/L    CO2 25 20 - 29 mmol/L    Calcium 9.4 8.7 - 10.3 mg/dL    Protein, total 6.8 6.0 - 8.5 g/dL    Albumin 4.6 3.8 - 4.8 g/dL    GLOBULIN, TOTAL 2.2 1.5 - 4.5 g/dL    A-G Ratio 2.1 1.2 - 2.2    Bilirubin, total 0.3 0.0 - 1.2 mg/dL    Alk. phosphatase 64 39 - 117 IU/L    AST (SGOT) 18 0 - 40 IU/L    ALT (SGPT) 16 0 - 32 IU/L   CBC W/O DIFF   Result Value Ref Range    WBC 5.5 3.4 - 10.8 x10E3/uL    RBC 4.71 3.77 - 5.28 x10E6/uL    HGB 13.9 11.1 - 15.9 g/dL    HCT 42.3 34.0 - 46.6 %    MCV 90 79 - 97 fL    MCH 29.5 26.6 - 33.0 pg    MCHC 32.9 31.5 - 35.7 g/dL    RDW 12.0 11.7 - 15.4 %    PLATELET 584 916 - 015 x10E3/uL   CVD REPORT   Result Value Ref Range    INTERPRETATION Note        No orders of the defined types were placed in this encounter. No diagnosis found. Migraines are a lot better. Sleeping well. Off all OTC meds. Has oxygen if needed. Working back up to full time.  Call with changes.              This note will not be viewable in [facility]

## 2020-06-26 RX ORDER — LOSARTAN POTASSIUM 50 MG/1
50 TABLET ORAL DAILY
Qty: 30 TAB | Refills: 0 | Status: SHIPPED | OUTPATIENT
Start: 2020-06-26 | End: 2020-07-04 | Stop reason: SDUPTHER

## 2020-07-06 RX ORDER — LOSARTAN POTASSIUM 50 MG/1
50 TABLET ORAL DAILY
Qty: 30 TAB | Refills: 0 | Status: CANCELLED | OUTPATIENT
Start: 2020-07-06

## 2020-07-06 NOTE — TELEPHONE ENCOUNTER
Patient has a revisit scheduled for 08/05/2020 with ARNOL Hansen     Last visit 02/04/2020 NP [address]   Next appointment 08/05/2020 ARNOL Hansen   Previous refill encounter(s) 06/26/2020 Cozaar #30     Requested Prescriptions     Pending Prescriptions Disp Refills    losartan (COZAAR) 50 mg tablet 30 Tab 0     Sig: Take 1 Tab by mouth daily.

## 2020-07-07 RX ORDER — LOSARTAN POTASSIUM 50 MG/1
50 TABLET ORAL DAILY
Qty: 30 TAB | Refills: 0 | Status: SHIPPED | OUTPATIENT
Start: 2020-07-07 | End: 2020-07-29

## 2020-07-07 NOTE — TELEPHONE ENCOUNTER
Pt following up on her refill. From earlier messages/orders last refill was on 06/26/2020 for 30 tablets. She should be good till end of July but she is complaining that she only has 3 tablets left. Not sure what is going on. Please call her. I did tell her she will need a vv but she is concerned and wants to talk to a nurse.     BCB:  [phone]

## 2020-07-29 RX ORDER — LOSARTAN POTASSIUM 50 MG/1
TABLET ORAL
Qty: 30 TAB | Refills: 0 | Status: SHIPPED | OUTPATIENT
Start: 2020-07-29 | End: 2020-09-04

## 2020-08-05 ENCOUNTER — OFFICE VISIT (OUTPATIENT)
Dept: FAMILY MEDICINE CLINIC | Age: 60
End: 2020-08-05
Payer: COMMERCIAL

## 2020-08-05 VITALS
WEIGHT: 120 LBS | HEART RATE: 88 BPM | BODY MASS INDEX: 19.29 KG/M2 | TEMPERATURE: 98.2 F | OXYGEN SATURATION: 98 % | HEIGHT: 66 IN | RESPIRATION RATE: 16 BRPM | SYSTOLIC BLOOD PRESSURE: 112 MMHG | DIASTOLIC BLOOD PRESSURE: 70 MMHG

## 2020-08-05 DIAGNOSIS — I10 ESSENTIAL HYPERTENSION WITH GOAL BLOOD PRESSURE LESS THAN 140/90: ICD-10-CM

## 2020-08-05 DIAGNOSIS — Z23 ENCOUNTER FOR IMMUNIZATION: ICD-10-CM

## 2020-08-05 DIAGNOSIS — E55.9 VITAMIN D DEFICIENCY: ICD-10-CM

## 2020-08-05 DIAGNOSIS — Z12.31 SCREENING MAMMOGRAM, ENCOUNTER FOR: ICD-10-CM

## 2020-08-05 DIAGNOSIS — Z00.00 GENERAL MEDICAL EXAM: Primary | ICD-10-CM

## 2020-08-05 PROCEDURE — 90471 IMMUNIZATION ADMIN: CPT | Performed by: NURSE PRACTITIONER

## 2020-08-05 PROCEDURE — 99396 PREV VISIT EST AGE 40-64: CPT | Performed by: NURSE PRACTITIONER

## 2020-08-05 PROCEDURE — 90715 TDAP VACCINE 7 YRS/> IM: CPT | Performed by: NURSE PRACTITIONER

## 2020-08-05 RX ORDER — ACETAMINOPHEN 500 MG
TABLET ORAL
COMMUNITY
Start: 2019-08-08 | End: 2021-11-02

## 2020-08-05 NOTE — PATIENT INSTRUCTIONS
Vaccine Information Statement Tdap (Tetanus, Diphtheria, Pertussis) Vaccine: What you need to know Many Vaccine Information Statements are available in [place] and other languages. See www.immunize.org/vis Hojas de información sobre vacunas están disponibles en español y en muchos otros idiomas. Visite www.immunize.org/vis 1. Why get vaccinated? Tdap vaccine can prevent tetanus, diphtheria, and pertussis. Diphtheria and pertussis spread from person to person. Tetanus enters the body through cuts or wounds.  TETANUS (T) causes painful stiffening of the muscles. Tetanus can lead to serious health problems, including being unable to open the mouth, having trouble swallowing and breathing, or death.  DIPHTHERIA (D) can lead to difficulty breathing, heart failure, paralysis, or death.  PERTUSSIS (aP), also known as whooping cough, can cause uncontrollable, violent coughing which makes it hard to breathe, eat, or drink. Pertussis can be extremely serious in babies and young children, causing pneumonia, convulsions, brain damage, or death. In teens and adults, it can cause weight loss, loss of bladder control, passing out, and rib fractures from severe coughing. 2. Tdap vaccine Tdap is only for children 7 years and older, adolescents, and adults. Adolescents should receive a single dose of Tdap, preferably at age 6 or 15 years. Pregnant women should get a dose of Tdap during every pregnancy, to protect the  from pertussis. Infants are most at risk for severe, life-threatening complications from pertussis. Adults who have never received Tdap should get a dose of Tdap. Also, adults should receive a booster dose every 10 years, or earlier in the case of a severe and dirty wound or burn. Booster doses can be either Tdap or Td (a different vaccine that protects against tetanus and diphtheria but not pertussis). Tdap may be given at the same time as other vaccines. 3. Talk with your health care provider Tell your vaccine provider if the person getting the vaccine: 
 Has had an allergic reaction after a previous dose of any vaccine that protects against tetanus, diphtheria, or pertussis, or has any severe, life-threatening allergies.  Has had a coma, decreased level of consciousness, or prolonged seizures within 7 days after a previous dose of any pertussis vaccine (DTP, DTaP, or Tdap).  Has seizures or another nervous system problem.  Has ever had Guillain-Barré Syndrome (also called GBS).  Has had severe pain or swelling after a previous dose of any vaccine that protects against tetanus or diphtheria. In some cases, your health care provider may decide to postpone Tdap vaccination to a future visit. People with minor illnesses, such as a cold, may be vaccinated. People who are moderately or severely ill should usually wait until they recover before getting Tdap vaccine. Your health care provider can give you more information. 4. Risks of a vaccine reaction  Pain, redness, or swelling where the shot was given, mild fever, headache, feeling tired, and nausea, vomiting, diarrhea, or stomachache sometimes happen after Tdap vaccine. People sometimes faint after medical procedures, including vaccination. Tell your provider if you feel dizzy or have vision changes or ringing in the ears. As with any medicine, there is a very remote chance of a vaccine causing a severe allergic reaction, other serious injury, or death. 5. What if there is a serious problem? An allergic reaction could occur after the vaccinated person leaves the clinic.  If you see signs of a severe allergic reaction (hives, swelling of the face and throat, difficulty breathing, a fast heartbeat, dizziness, or weakness), call 9-1-1 and get the person to the nearest hospital. 
 
 For other signs that concern you, call your health care provider. Adverse reactions should be reported to the Vaccine Adverse Event Reporting System (VAERS). Your health care provider will usually file this report, or you can do it yourself. Visit the VAERS website at www.vaers. [facility].gov or call 8-902.612.7607. VAERS is only for reporting reactions, and VAERS staff do not give medical advice. 6. The National Vaccine Injury Compensation Program 
 
The [facility] Vaccine Injury Compensation Program (VICP) is a federal program that was created to compensate people who may have been injured by certain vaccines. Visit the VICP website at www.[facility].gov/vaccinecompensation or call 0-561.323.7586 to learn about the program and about filing a claim. There is a time limit to file a claim for compensation. 7. How can I learn more?  Ask your health care provider.  Call your local or state health department.  Contact the Centers for Disease Control and Prevention (CDC): 
- Call 2-431.798.6369 (1-800-CDC-INFO) or 
- Visit CDCs website at www.cdc.gov/vaccines Vaccine Information Statement (Interim) Tdap (Tetanus, Diphtheria, Pertussis) Vaccine 04/01/2020 
[address] [place] [id number] Department of Health and [employer] Centers for Disease Control and Prevention Office Use Only

## 2020-08-05 NOTE — PROGRESS NOTES
Subjective:   62 y.o. female for Well Woman Check. Her gyne and breast care is done elsewhere by her Ob-Gyne physician. Patient Active Problem List   Diagnosis Code    GERD (gastroesophageal reflux disease) K21.9    AR (allergic rhinitis) J30.9    Osteoporosis, post-menopausal M81.0    Hyperlipidemia E78.5    Essential hypertension I10    Pelvic mass R19.00     Patient Active Problem List    Diagnosis Date Noted    Pelvic mass 08/01/2018    Hyperlipidemia 02/24/2016    Essential hypertension 02/24/2016    Osteoporosis, post-menopausal 10/01/2015    GERD (gastroesophageal reflux disease) 10/26/2010    AR (allergic rhinitis) 10/26/2010     Current Outpatient Medications   Medication Sig Dispense Refill    acetaminophen (TYLENOL) 500 mg tablet 500 mg = 1 tab each dose, PO, every 4 hours, PRN: for pain, x2 weekly PRN, # 60 tab, 0 Refills      losartan (COZAAR) 50 mg tablet TAKE 1 TABLET BY MOUTH EVERY DAY 30 Tab 0    verapamil ER (VERELAN) 120 mg ER capsule TAKE 1 CAPSULE BY MOUTH EVERY DAY 90 Cap 1    dextrose 5% solp 250 mL with CARBOplatin 10 mg/mL soln 1,500 mg, PO, bid, Gummies, 0 Refills      fexofenadine HCl (ALLEGRA PO) Take  by mouth.  OTHER Rx Portable Oxygen Tank. Dx: Cluster Headache 346.2. Instructions: For cluster headache not responding to medications, turn on to 8-12 Liters per minute and inhale oxygen via mask for 15 minutes. Dispense one cannister with concentrator, refill x 3. 1 Container 5    onabotulinumtoxinA (BOTOX) 200 unit injection Inject 155 units into face, head, neck , shoulders every 3 months for FDA indicated and approved migraine prevention 1 Vial 5    OTHER       LYSINE PO Take  by mouth daily.  cholecalciferol, vitamin D3, 2,000 unit tab Take 2,000 Units by mouth daily.  B.infantis-B.ani-B.long-B.bifi (PROBIOTIC 4X) 10-15 mg TbEC Take 1 Tab by mouth.  CALCIUM CARBONATE/VITAMIN D3 (CALCIUM + D PO) Take 1 Tab by mouth daily.  Total 770 mg daily calcium and 1000 units vitamin d       MULTIVITAMIN PO Take 1 Tab by mouth daily.       galcanezumab-gnlm (EMGALITY PEN SC) Emgality 100mg prefilled syringes, Episodic Cluster Headache, See Instructions, #: 3 EA, 3 Refill(s)      acetaminophen/diphenhydramine (TYLENOL PM PO) 1/2 tab, PO, bedtime, x2 weekly PRN, 0 Refills      aspirin/acetaminophen/caffeine (EXCEDRIN MIGRAINE PO) = 2 tab, PO, PRN: Migraine headache, x2 weekly PRN, Easyscript       No Known Allergies  Past Medical History:   Diagnosis Date    Abnormal Pap smear     h/o    Allergic rhinitis due to other allergen     Arrhythmia     PAC'S - [place] TEA CAUSES    Esophageal reflux     Herpes zoster     Hypertension     Mixed hyperlipidemia     Nausea & vomiting     Osteopenia     Other atopic dermatitis and related conditions     Shingles rash 10/2015    small spot on left shoulder    Unspecified spontaneous  without mention of complication     X2     Past Surgical History:   Procedure Laterality Date    COLPOSCOPY  29 y/o    with cryo    DILATION AND CURETTAGE      HX COLONOSCOPY      HX WRIST FRACTURE TX Right     WRIST REPAIR     Family History   Problem Relation Age of Onset    Hypertension Mother     Heart Disease Mother         CHF    Cancer Mother         kidney    Heart Attack Father     Coronary Artery Disease Father         with pacemaker    Asthma Father     Heart Disease Father         pacemaker    Stroke Maternal Grandmother     Heart Attack Paternal Grandfather     Anesth Problems Neg Hx      Social History     Tobacco Use    Smoking status: Never Smoker    Smokeless tobacco: Never Used   Substance Use Topics    Alcohol use: No     Alcohol/week: 0.8 standard drinks     Types: 1 Glasses of wine per week          Specific concerns today: refusing colonoscopy and shingles vaccine     Review of Systems  A comprehensive review of systems was negative except for that written in the HPI.    Objective:   Blood pressure 112/70, pulse 88, temperature 98.2 °F (36.8 °C), temperature source Oral, resp. rate 16, height 5' 6\" (1.676 m), weight 120 lb (54.4 kg), SpO2 98 %. Physical Examination:   General appearance - alert, well appearing, and in no distress  Mental status - alert, oriented to person, place, and time  Eyes - pupils equal and reactive, extraocular eye movements intact  Ears - bilateral TM's and external ear canals normal  Nose - normal and patent, no erythema, discharge or polyps  Mouth - mucous membranes moist, pharynx normal without lesions  Neck - supple, no significant adenopathy  Lymphatics - no palpable lymphadenopathy, no hepatosplenomegaly  Chest - clear to auscultation, no wheezes, rales or rhonchi, symmetric air entry  Heart - normal rate, regular rhythm, normal S1, S2, no murmurs, rubs, clicks or gallops  Abdomen - soft, nontender, nondistended, no masses or organomegaly  Back exam - full range of motion, no tenderness, palpable spasm or pain on motion  Neurological - alert, oriented, normal speech, no focal findings or movement disorder noted  Musculoskeletal - no joint tenderness, deformity or swelling  Extremities - peripheral pulses normal, no pedal edema, no clubbing or cyanosis  Skin - normal coloration and turgor, no rashes, no suspicious skin lesions noted     Assessment/Plan:     routine labs ordered    ICD-10-CM ICD-9-CM    1. General medical exam  Z00.00 V70.9 LIPID PANEL      TSH 3RD GENERATION      T4, FREE   2. Essential hypertension with goal blood pressure less than 140/90  N59 165.4 METABOLIC PANEL, COMPREHENSIVE      CBC W/O DIFF   3. Vitamin D deficiency  E55.9 268.9 VITAMIN D, 25 HYDROXY   4. Screening mammogram, encounter for  Z12.31 V76.12 [facility] MAMMO BI SCREENING INCL CAD   5.  Encounter for immunization  Z23 V03.89 TETANUS, DIPHTHERIA TOXOIDS AND ACELLULAR PERTUSSIS VACCINE (TDAP), IN INDIVIDS. >=7, IM      [place] IMMUNIZ ADMIN,1 SINGLE/COMB VAC/TOXOID

## 2020-08-05 NOTE — PROGRESS NOTES
Chief Complaint   Patient presents with    Complete Physical    Immunization/Injection     TDAP     1. Have you been to the ER, urgent care clinic since your last visit? Hospitalized since your last visit? No    2. Have you seen or consulted any other health care providers outside of the [address] [place] [address] since your last visit? Include any pap smears or colon screening. Sherry Humphrey  is a 62 y.o.  female  who present for TDAP immunizations/injections. He/she denies any symptoms , reactions or allergies that would exclude them from being immunized today. Risks and adverse reactions were discussed and the VIS was given if applicable to them. All questions were addressed. He/She was observed for 10 min post injection. There were no reactions observed.     Shannan Williamson LPN

## 2020-08-06 LAB
25(OH)D3+25(OH)D2 SERPL-MCNC: 57 NG/ML (ref 30–100)
ALBUMIN SERPL-MCNC: 4.4 G/DL (ref 3.8–4.8)
ALBUMIN/GLOB SERPL: 2 {RATIO} (ref 1.2–2.2)
ALP SERPL-CCNC: 62 IU/L (ref 39–117)
ALT SERPL-CCNC: 15 IU/L (ref 0–32)
AST SERPL-CCNC: 19 IU/L (ref 0–40)
BILIRUB SERPL-MCNC: 0.4 MG/DL (ref 0–1.2)
BUN SERPL-MCNC: 12 MG/DL (ref 8–27)
BUN/CREAT SERPL: 17 (ref 12–28)
CALCIUM SERPL-MCNC: 9.4 MG/DL (ref 8.7–10.3)
CHLORIDE SERPL-SCNC: 98 MMOL/L (ref 96–106)
CHOLEST SERPL-MCNC: 260 MG/DL (ref 100–199)
CO2 SERPL-SCNC: 27 MMOL/L (ref 20–29)
CREAT SERPL-MCNC: 0.7 MG/DL (ref 0.57–1)
ERYTHROCYTE [DISTWIDTH] IN BLOOD BY AUTOMATED COUNT: 11.9 % (ref 11.7–15.4)
GLOBULIN SER CALC-MCNC: 2.2 G/DL (ref 1.5–4.5)
GLUCOSE SERPL-MCNC: 93 MG/DL (ref 65–99)
HCT VFR BLD AUTO: 41 % (ref 34–46.6)
HDLC SERPL-MCNC: 77 MG/DL
HGB BLD-MCNC: 14 G/DL (ref 11.1–15.9)
INTERPRETATION, 910389: NORMAL
LDLC SERPL CALC-MCNC: 167 MG/DL (ref 0–99)
MCH RBC QN AUTO: 30.6 PG (ref 26.6–33)
MCHC RBC AUTO-ENTMCNC: 34.1 G/DL (ref 31.5–35.7)
MCV RBC AUTO: 90 FL (ref 79–97)
PLATELET # BLD AUTO: 222 X10E3/UL (ref 150–450)
POTASSIUM SERPL-SCNC: 4.3 MMOL/L (ref 3.5–5.2)
PROT SERPL-MCNC: 6.6 G/DL (ref 6–8.5)
RBC # BLD AUTO: 4.58 X10E6/UL (ref 3.77–5.28)
SODIUM SERPL-SCNC: 138 MMOL/L (ref 134–144)
T4 FREE SERPL-MCNC: 1.12 NG/DL (ref 0.82–1.77)
TRIGL SERPL-MCNC: 81 MG/DL (ref 0–149)
TSH SERPL DL<=0.005 MIU/L-ACNC: 1.3 UIU/ML (ref 0.45–4.5)
VLDLC SERPL CALC-MCNC: 16 MG/DL (ref 5–40)
WBC # BLD AUTO: 5.7 X10E3/UL (ref 3.4–10.8)

## 2020-09-04 RX ORDER — LOSARTAN POTASSIUM 50 MG/1
TABLET ORAL
Qty: 30 TAB | Refills: 0 | Status: SHIPPED | OUTPATIENT
Start: 2020-09-04 | End: 2020-11-10

## 2020-09-23 ENCOUNTER — OFFICE VISIT (OUTPATIENT)
Dept: NEUROLOGY | Age: 60
End: 2020-09-23
Payer: COMMERCIAL

## 2020-09-23 VITALS
BODY MASS INDEX: 19.21 KG/M2 | DIASTOLIC BLOOD PRESSURE: 92 MMHG | SYSTOLIC BLOOD PRESSURE: 141 MMHG | TEMPERATURE: 96.8 F | OXYGEN SATURATION: 98 % | HEART RATE: 80 BPM | WEIGHT: 119 LBS

## 2020-09-23 DIAGNOSIS — G43.919 INTRACTABLE MIGRAINE WITHOUT STATUS MIGRAINOSUS, UNSPECIFIED MIGRAINE TYPE: Primary | ICD-10-CM

## 2020-09-23 PROCEDURE — 99213 OFFICE O/P EST LOW 20 MIN: CPT | Performed by: NURSE PRACTITIONER

## 2020-09-23 NOTE — PROGRESS NOTES
Margert Kawasaki is a 62 y.o. female who presents with the following  Chief Complaint   Patient presents with    Migraine    Follow-up       HPI     FU for Botox. Botox through DR. Marks. Doing well. Tomorrow is next botox. She has only had to use her oxygen a few times since last visit. Has not used any tablets. Keeping Verapamil. Off all other medications. Feeling good moving with full time work. Nothing new to report. She feels like she is at a good place.            As a full recap from previous visits,   Patient comes back in for a follow up for cluster migraine, headaches, migraines.   She continues to have daily migraines usually coming on about night. She states they last about 6 hours or longer. She uses oxygen to help with rescue. She has failed multiple rescues.   She is unable to sleep and can not function well at work. Aimovig hasn't helped yet. Tried Topamax made her feel bad. Also on Verapamil and Cozaar with no results. Failed Celexa also.      Zomig tablets, percocet, zembrace have not really helped. She states her headaches come on strong. Tired Zembrace and this did not help. She has tried this multiple times without relief. She has spells like this multiple times a year. CT was normal.   She has tried OTC medications without relief.         She states the pain is a 10/10 when it comes on.   In the entire head. She has light sensitivity, dizziness, and nausea with this. She has had to leave work multiple times due to this. She is on BP medication.  Tried Celexa also.          No Known Allergies    Current Outpatient Medications   Medication Sig    losartan (COZAAR) 50 mg tablet TAKE 1 TABLET BY MOUTH EVERY DAY    acetaminophen/diphenhydramine (TYLENOL PM PO) 1/2 tab, PO, bedtime, x2 weekly PRN, 0 Refills    aspirin/acetaminophen/caffeine (EXCEDRIN MIGRAINE PO) = 2 tab, PO, PRN: Migraine headache, x2 weekly PRN, Easyscript    acetaminophen (TYLENOL) 500 mg tablet 500 mg = 1 tab each dose, PO, every 4 hours, PRN: for pain, x2 weekly PRN, # 60 tab, 0 Refills    verapamil ER (VERELAN) 120 mg ER capsule TAKE 1 CAPSULE BY MOUTH EVERY DAY    dextrose 5% solp 250 mL with CARBOplatin 10 mg/mL soln 1,500 mg, PO, bid, Gummies, 0 Refills    fexofenadine HCl (ALLEGRA PO) Take  by mouth.  LYSINE PO Take  by mouth daily.  cholecalciferol, vitamin D3, 2,000 unit tab Take 2,000 Units by mouth daily.  B.infantis-B.ani-B.long-B.bifi (PROBIOTIC 4X) 10-15 mg TbEC Take 1 Tab by mouth.  CALCIUM CARBONATE/VITAMIN D3 (CALCIUM + D PO) Take 1 Tab by mouth daily. Total 770 mg daily calcium and 1000 units vitamin d     MULTIVITAMIN PO Take 1 Tab by mouth daily. No current facility-administered medications for this visit.         Social History     Tobacco Use   Smoking Status Never Smoker   Smokeless Tobacco Never Used       Past Medical History:   Diagnosis Date    Abnormal Pap smear     h/o    Allergic rhinitis due to other allergen     Arrhythmia     PAC'S - [place]OM[place] TEA CAUSES    Esophageal reflux     Herpes zoster     Hypertension     Mixed hyperlipidemia     Nausea & vomiting     Osteopenia     Other atopic dermatitis and related conditions     Shingles rash 10/2015    small spot on left shoulder    Unspecified spontaneous  without mention of complication     X2       Past Surgical History:   Procedure Laterality Date    COLPOSCOPY  29 y/o    with cryo    DILATION AND CURETTAGE      HX COLONOSCOPY      HX WRIST FRACTURE TX Right     WRIST REPAIR       Family History   Problem Relation Age of Onset    Hypertension Mother     Heart Disease Mother         CHF    Cancer Mother         kidney    Heart Attack Father     Coronary Artery Disease Father         with pacemaker    Asthma Father     Heart Disease Father         pacemaker    Stroke Maternal Grandmother     Heart Attack Paternal Grandfather     Anesth Problems Neg Hx        Social History Socioeconomic History    Marital status:      Spouse name: Colt Warren Number of children: 2    Years of education: Not on file    Highest education level: Not on file   Occupational History    Occupation: Nurse     Employer: [id number]   Tobacco Use    Smoking status: Never Smoker    Smokeless tobacco: Never Used   Substance and Sexual Activity    Alcohol use: No     Alcohol/week: 0.8 standard drinks     Types: 1 Glasses of wine per week    Drug use: No    Sexual activity: Yes     Partners: Male       Review of Systems   Eyes: Positive for blurred vision and photophobia. Negative for double vision. Cardiovascular: Negative for chest pain and palpitations. Gastrointestinal: Positive for nausea. Negative for vomiting. Neurological: Positive for dizziness and headaches. Negative for tingling, tremors, sensory change, seizures and loss of consciousness. Remainder of comprehensive review is negative. Physical Exam :    Visit Vitals  BP (!) 141/92   Pulse 80   Temp 96.8 °F (36 °C)   Wt 54 kg (119 lb)   SpO2 98%   BMI 19.21 kg/m²       General: Well defined, nourished, and groomed individual in no acute distress.    Neck: Supple, nontender, no bruits, no pain with resistance to active range of motion.    Heart: Regular rate and rhythm, no murmurs, rub, or gallop. Normal S1S2. Lungs: Clear to auscultation bilaterally with equal chest expansion, no cough, no wheeze  Musculoskeletal: Extremities revealed no edema and had full range of motion of joints.    Psych: Good mood and bright affect    NEUROLOGICAL EXAMINATION:    Mental Status: Alert and oriented to person, place, and time    Cranial Nerves:    II, III, IV, VI: Visual acuity grossly intact. Visual fields are normal.    Pupils are equal, round, and reactive to light and accommodation.    Extra-ocular movements are full and fluid.  Fundoscopic exam was benign, no ptosis or nystagmus.    V-XII: Hearing is grossly intact. Facial features are symmetric, with normal sensation and strength. The palate rises symmetrically and the tongue protrudes midline. Sternocleidomastoids 5/5. Motor Examination: Normal tone, bulk, and strength, 5/5 muscle strength throughout. Coordination: Finger to nose was normal. No resting or intention tremor    Gait and Station: Steady while walking. Normal arm swing. No pronator drift. No muscle wasting or fasiculations noted. Reflexes: DTRs 2+ throughout. Results for orders placed or performed in visit on 08/05/20   LIPID PANEL   Result Value Ref Range    Cholesterol, total 260 (H) 100 - 199 mg/dL    Triglyceride 81 0 - 149 mg/dL    HDL Cholesterol 77 >39 mg/dL    VLDL, calculated 16 5 - 40 mg/dL    LDL, calculated 167 (H) 0 - 99 mg/dL   METABOLIC PANEL, COMPREHENSIVE   Result Value Ref Range    Glucose 93 65 - 99 mg/dL    BUN 12 8 - 27 mg/dL    Creatinine 0.70 0.57 - 1.00 mg/dL    GFR est non-AA 94 >59 mL/min/1.73    GFR est  >59 mL/min/1.73    BUN/Creatinine ratio 17 12 - 28    Sodium 138 134 - 144 mmol/L    Potassium 4.3 3.5 - 5.2 mmol/L    Chloride 98 96 - 106 mmol/L    CO2 27 20 - 29 mmol/L    Calcium 9.4 8.7 - 10.3 mg/dL    Protein, total 6.6 6.0 - 8.5 g/dL    Albumin 4.4 3.8 - 4.8 g/dL    GLOBULIN, TOTAL 2.2 1.5 - 4.5 g/dL    A-G Ratio 2.0 1.2 - 2.2    Bilirubin, total 0.4 0.0 - 1.2 mg/dL    Alk.  phosphatase 62 39 - 117 IU/L    AST (SGOT) 19 0 - 40 IU/L    ALT (SGPT) 15 0 - 32 IU/L   TSH 3RD GENERATION   Result Value Ref Range    TSH 1.300 0.450 - 4.500 uIU/mL   T4, FREE   Result Value Ref Range    T4, Free 1.12 0.82 - 1.77 ng/dL   CBC W/O DIFF   Result Value Ref Range    WBC 5.7 3.4 - 10.8 x10E3/uL    RBC 4.58 3.77 - 5.28 x10E6/uL    HGB 14.0 11.1 - 15.9 g/dL    HCT 41.0 34.0 - 46.6 %    MCV 90 79 - 97 fL    MCH 30.6 26.6 - 33.0 pg    MCHC 34.1 31.5 - 35.7 g/dL    RDW 11.9 11.7 - 15.4 %    PLATELET 958 944 - 909 x10E3/uL   VITAMIN D, 25 HYDROXY   Result Value Ref Range    VITAMIN D, 25-HYDROXY 57.0 30.0 - 100.0 ng/mL   CVD REPORT   Result Value Ref Range    INTERPRETATION Note        No orders of the defined types were placed in this encounter. No diagnosis found. FU for migraines.    Doing well with Botox through Dr. Dia Duff.   Keep oxygen for PRN   Doing well             This note will not be viewable in [email or web address]

## 2020-11-10 RX ORDER — LOSARTAN POTASSIUM 50 MG/1
TABLET ORAL
Qty: 30 TAB | Refills: 0 | Status: SHIPPED | OUTPATIENT
Start: 2020-11-10 | End: 2020-12-03 | Stop reason: SDUPTHER

## 2020-11-11 ENCOUNTER — PATIENT MESSAGE (OUTPATIENT)
Dept: FAMILY MEDICINE CLINIC | Age: 60
End: 2020-11-11

## 2020-11-18 DIAGNOSIS — G44.009 MIGRAINE-CLUSTER HEADACHE SYNDROME: ICD-10-CM

## 2020-11-19 RX ORDER — VERAPAMIL HYDROCHLORIDE 120 MG/1
CAPSULE, EXTENDED RELEASE ORAL
Qty: 90 CAP | Refills: 1 | Status: SHIPPED | OUTPATIENT
Start: 2020-11-19 | End: 2021-03-09

## 2020-12-03 NOTE — TELEPHONE ENCOUNTER
Last Visit: 8/5/20  NP [address]- labs 8/5/20  Next Appointment: Not scheduled  Previous Refill Encounter(s): 11/10/20  30    Requested Prescriptions     Pending Prescriptions Disp Refills    losartan (COZAAR) 50 mg tablet 90 Tab 1     Sig: Take 1 Tab by mouth daily.

## 2020-12-04 RX ORDER — LOSARTAN POTASSIUM 50 MG/1
50 TABLET ORAL DAILY
Qty: 90 TAB | Refills: 1 | Status: SHIPPED | OUTPATIENT
Start: 2020-12-04 | End: 2021-06-01

## 2020-12-14 ENCOUNTER — HOSPITAL ENCOUNTER (OUTPATIENT)
Dept: MAMMOGRAPHY | Age: 60
Discharge: HOME | End: 2020-12-14
Payer: COMMERCIAL

## 2020-12-14 DIAGNOSIS — Z12.31 SCREENING MAMMOGRAM, ENCOUNTER FOR: ICD-10-CM

## 2020-12-14 PROCEDURE — 77067 SCR MAMMO BI INCL CAD: CPT

## 2021-03-06 DIAGNOSIS — G44.009 MIGRAINE-CLUSTER HEADACHE SYNDROME: ICD-10-CM

## 2021-03-09 RX ORDER — VERAPAMIL HYDROCHLORIDE 120 MG/1
CAPSULE, EXTENDED RELEASE ORAL
Qty: 90 CAP | Refills: 1 | Status: SHIPPED | OUTPATIENT
Start: 2021-03-09 | End: 2021-03-23 | Stop reason: ALTCHOICE

## 2021-03-23 ENCOUNTER — OFFICE VISIT (OUTPATIENT)
Dept: NEUROLOGY | Age: 61
End: 2021-03-23
Payer: COMMERCIAL

## 2021-03-23 VITALS
SYSTOLIC BLOOD PRESSURE: 118 MMHG | OXYGEN SATURATION: 98 % | WEIGHT: 123 LBS | HEART RATE: 64 BPM | BODY MASS INDEX: 19.85 KG/M2 | TEMPERATURE: 97.3 F | DIASTOLIC BLOOD PRESSURE: 70 MMHG

## 2021-03-23 DIAGNOSIS — G44.009 MIGRAINE-CLUSTER HEADACHE SYNDROME: Primary | ICD-10-CM

## 2021-03-23 PROCEDURE — 99213 OFFICE O/P EST LOW 20 MIN: CPT | Performed by: NURSE PRACTITIONER

## 2021-03-23 RX ORDER — VERAPAMIL HYDROCHLORIDE 40 MG/1
TABLET ORAL
Qty: 21 TAB | Refills: 0 | Status: SHIPPED | OUTPATIENT
Start: 2021-03-23 | End: 2021-11-02

## 2021-03-23 NOTE — PROGRESS NOTES
Blane Cruz is a 56 y.o. female who presents with the following  Chief Complaint   Patient presents with    Follow-up    Migraine       HPI    FU for migraines, cluster migraines. She has not had any migraines since before last visit in September  Has Botox this week to help   Will talk to Dr. Trey Solis about this     Wants to come off Verapamil. Not needed any OTC, Oxygen, or anything since before last visit. Back to work 40 hours a week   5 days a week   Doing really well overall. No concerns. No Known Allergies    Current Outpatient Medications   Medication Sig    verapamiL (CALAN) 40 mg tablet Take 2 tablets by mouth nightly X 1 week . Then drop to 40 mg nightly X 1 week then stop    losartan (COZAAR) 50 mg tablet Take 1 Tab by mouth daily.  acetaminophen/diphenhydramine (TYLENOL PM PO) 1/2 tab, PO, bedtime, x2 weekly PRN, 0 Refills    aspirin/acetaminophen/caffeine (EXCEDRIN MIGRAINE PO) = 2 tab, PO, PRN: Migraine headache, x2 weekly PRN, Easyscript    acetaminophen (TYLENOL) 500 mg tablet 500 mg = 1 tab each dose, PO, every 4 hours, PRN: for pain, x2 weekly PRN, # 60 tab, 0 Refills    dextrose 5% solp 250 mL with CARBOplatin 10 mg/mL soln 1,500 mg, PO, bid, Gummies, 0 Refills    fexofenadine HCl (ALLEGRA PO) Take  by mouth.  LYSINE PO Take  by mouth daily.  cholecalciferol, vitamin D3, 2,000 unit tab Take 2,000 Units by mouth daily.  B.infantis-B.ani-B.long-B.bifi (PROBIOTIC 4X) 10-15 mg TbEC Take 1 Tab by mouth.  CALCIUM CARBONATE/VITAMIN D3 (CALCIUM + D PO) Take 1 Tab by mouth daily. Total 770 mg daily calcium and 1000 units vitamin d     MULTIVITAMIN PO Take 1 Tab by mouth daily. No current facility-administered medications for this visit.         Social History     Tobacco Use   Smoking Status Never Smoker   Smokeless Tobacco Never Used       Past Medical History:   Diagnosis Date    Abnormal Pap smear     h/o    Allergic rhinitis due to other allergen     Arrhythmia     PAC'S - CHAMOMIL[place] TEA CAUSES    Esophageal reflux     Herpes zoster     Hypertension     Mixed hyperlipidemia     Nausea & vomiting     Osteopenia     Other atopic dermatitis and related conditions     Shingles rash 10/2015    small spot on left shoulder    Unspecified spontaneous  without mention of complication     X2       Past Surgical History:   Procedure Laterality Date    COLPOSCOPY  29 y/o    with cryo    DILATION AND CURETTAGE      HX COLONOSCOPY      HX WRIST FRACTURE TX Right     WRIST REPAIR       Family History   Problem Relation Age of Onset    Hypertension Mother     Heart Disease Mother         CHF    Cancer Mother         kidney    Heart Attack Father     Coronary Artery Disease Father         with pacemaker    Asthma Father     Heart Disease Father         pacemaker    Stroke Maternal Grandmother     Heart Attack Paternal Grandfather     Anesth Problems Neg Hx        Social History     Socioeconomic History    Marital status:      Spouse name: Moshe Arias Number of children: 2    Years of education: Not on file    Highest education level: Not on file   Occupational History    Occupation: Nurse     Employer: [id number]   Tobacco Use    Smoking status: Never Smoker    Smokeless tobacco: Never Used   Substance and Sexual Activity    Alcohol use: No     Alcohol/week: 0.8 standard drinks     Types: 1 Glasses of wine per week    Drug use: No    Sexual activity: Yes     Partners: Male       Review of Systems   Eyes: Negative for blurred vision, double vision and photophobia. Gastrointestinal: Negative for nausea and vomiting. Neurological: Negative for dizziness, tingling, tremors, sensory change, seizures, loss of consciousness and headaches. Remainder of comprehensive review is negative.      Physical Exam :    Visit Vitals  /70   Pulse 64   Temp 97.3 °F (36.3 °C)   Wt 55.8 kg (123 lb)   SpO2 98%   BMI 19.85 kg/m²       General: Well defined, nourished, and groomed individual in no acute distress.    Neck: Supple, nontender, no bruits, no pain with resistance to active range of motion.    Heart: Regular rate and rhythm, no murmurs, rub, or gallop. Normal S1S2. Lungs: Clear to auscultation bilaterally with equal chest expansion, no cough, no wheeze  Musculoskeletal: Extremities revealed no edema and had full range of motion of joints.    Psych: Good mood and bright affect    NEUROLOGICAL EXAMINATION:    Mental Status: Alert and oriented to person, place, and time    Cranial Nerves:    II, III, IV, VI: Visual acuity grossly intact. Visual fields are normal.    Pupils are equal, round, and reactive to light and accommodation.    Extra-ocular movements are full and fluid. Fundoscopic exam was benign, no ptosis or nystagmus.    V-XII: Hearing is grossly intact. Facial features are symmetric, with normal sensation and strength. The palate rises symmetrically and the tongue protrudes midline. Sternocleidomastoids 5/5. Motor Examination: Normal tone, bulk, and strength, 5/5 muscle strength throughout. Coordination: Finger to nose was normal. No resting or intention tremor    Gait and Station: Steady while walking. Normal arm swing. No pronator drift. No muscle wasting or fasiculations noted. Reflexes: DTRs 2+ throughout.             Results for orders placed or performed in visit on 08/05/20   LIPID PANEL   Result Value Ref Range    Cholesterol, total 260 (H) 100 - 199 mg/dL    Triglyceride 81 0 - 149 mg/dL    HDL Cholesterol 77 >39 mg/dL    VLDL, calculated 16 5 - 40 mg/dL    LDL, calculated 167 (H) 0 - 99 mg/dL   METABOLIC PANEL, COMPREHENSIVE   Result Value Ref Range    Glucose 93 65 - 99 mg/dL    BUN 12 8 - 27 mg/dL    Creatinine 0.70 0.57 - 1.00 mg/dL    GFR est non-AA 94 >59 mL/min/1.73    GFR est  >59 mL/min/1.73    BUN/Creatinine ratio 17 12 - 28    Sodium 138 134 - 144 mmol/L    Potassium 4.3 3.5 - 5.2 mmol/L    Chloride 98 96 - 106 mmol/L    CO2 27 20 - 29 mmol/L    Calcium 9.4 8.7 - 10.3 mg/dL    Protein, total 6.6 6.0 - 8.5 g/dL    Albumin 4.4 3.8 - 4.8 g/dL    GLOBULIN, TOTAL 2.2 1.5 - 4.5 g/dL    A-G Ratio 2.0 1.2 - 2.2    Bilirubin, total 0.4 0.0 - 1.2 mg/dL    Alk. phosphatase 62 39 - 117 IU/L    AST (SGOT) 19 0 - 40 IU/L    ALT (SGPT) 15 0 - 32 IU/L   TSH 3RD GENERATION   Result Value Ref Range    TSH 1.300 0.450 - 4.500 uIU/mL   T4, FREE   Result Value Ref Range    T4, Free 1.12 0.82 - 1.77 ng/dL   CBC W/O DIFF   Result Value Ref Range    WBC 5.7 3.4 - 10.8 x10E3/uL    RBC 4.58 3.77 - 5.28 x10E6/uL    HGB 14.0 11.1 - 15.9 g/dL    HCT 41.0 34.0 - 46.6 %    MCV 90 79 - 97 fL    MCH 30.6 26.6 - 33.0 pg    MCHC 34.1 31.5 - 35.7 g/dL    RDW 11.9 11.7 - 15.4 %    PLATELET 429 704 - 834 x10E3/uL   VITAMIN D, 25 HYDROXY   Result Value Ref Range    VITAMIN D, 25-HYDROXY 57.0 30.0 - 100.0 ng/mL   CVD REPORT   Result Value Ref Range    INTERPRETATION Note        Orders Placed This Encounter    verapamiL (CALAN) 40 mg tablet     Sig: Take 2 tablets by mouth nightly X 1 week . Then drop to 40 mg nightly X 1 week then stop     Dispense:  21 Tab     Refill:  0       1. Migraine-cluster headache syndrome          Keep with Botox with DR. Marks at her direction. Wean off Verapamil as titrated on print out  Discussed keeping any PRN if needed. Track symptoms. Doing well.            This note will not be viewable in [email or web address]

## 2021-06-01 RX ORDER — LOSARTAN POTASSIUM 50 MG/1
TABLET ORAL
Qty: 30 TABLET | Refills: 0 | Status: SHIPPED | OUTPATIENT
Start: 2021-06-01 | End: 2021-06-28

## 2021-06-18 ENCOUNTER — OFFICE VISIT (OUTPATIENT)
Dept: FAMILY MEDICINE CLINIC | Age: 61
End: 2021-06-18
Payer: COMMERCIAL

## 2021-06-18 VITALS
DIASTOLIC BLOOD PRESSURE: 72 MMHG | OXYGEN SATURATION: 99 % | BODY MASS INDEX: 19.32 KG/M2 | SYSTOLIC BLOOD PRESSURE: 107 MMHG | RESPIRATION RATE: 18 BRPM | HEART RATE: 89 BPM | WEIGHT: 120.2 LBS | TEMPERATURE: 97.3 F | HEIGHT: 66 IN

## 2021-06-18 DIAGNOSIS — E78.5 HYPERLIPIDEMIA LDL GOAL <130: ICD-10-CM

## 2021-06-18 DIAGNOSIS — I10 ESSENTIAL HYPERTENSION WITH GOAL BLOOD PRESSURE LESS THAN 140/90: Primary | ICD-10-CM

## 2021-06-18 LAB
ALBUMIN SERPL-MCNC: 3.9 G/DL (ref 3.5–5)
ALBUMIN/GLOB SERPL: 1.4 {RATIO} (ref 1.1–2.2)
ALP SERPL-CCNC: 58 U/L (ref 45–117)
ALT SERPL-CCNC: 24 U/L (ref 12–78)
ANION GAP SERPL CALC-SCNC: 7 MMOL/L (ref 5–15)
AST SERPL-CCNC: 20 U/L (ref 15–37)
BILIRUB SERPL-MCNC: 0.5 MG/DL (ref 0.2–1)
BUN SERPL-MCNC: 17 MG/DL (ref 6–20)
BUN/CREAT SERPL: 22 (ref 12–20)
CALCIUM SERPL-MCNC: 8.9 MG/DL (ref 8.5–10.1)
CHLORIDE SERPL-SCNC: 103 MMOL/L (ref 97–108)
CHOLEST SERPL-MCNC: 242 MG/DL
CO2 SERPL-SCNC: 30 MMOL/L (ref 21–32)
CREAT SERPL-MCNC: 0.76 MG/DL (ref 0.55–1.02)
ERYTHROCYTE [DISTWIDTH] IN BLOOD BY AUTOMATED COUNT: 12.9 % (ref 11.5–14.5)
GLOBULIN SER CALC-MCNC: 2.8 G/DL (ref 2–4)
GLUCOSE SERPL-MCNC: 73 MG/DL (ref 65–100)
HCT VFR BLD AUTO: 41.3 % (ref 35–47)
HDLC SERPL-MCNC: 83 MG/DL
HDLC SERPL: 2.9 {RATIO} (ref 0–5)
HGB BLD-MCNC: 13.1 G/DL (ref 11.5–16)
LDLC SERPL CALC-MCNC: 148.6 MG/DL (ref 0–100)
MCH RBC QN AUTO: 29.7 PG (ref 26–34)
MCHC RBC AUTO-ENTMCNC: 31.7 G/DL (ref 30–36.5)
MCV RBC AUTO: 93.7 FL (ref 80–99)
NRBC # BLD: 0 K/UL (ref 0–0.01)
NRBC BLD-RTO: 0 PER 100 WBC
PLATELET # BLD AUTO: 201 K/UL (ref 150–400)
PMV BLD AUTO: 9.5 FL (ref 8.9–12.9)
POTASSIUM SERPL-SCNC: 4.1 MMOL/L (ref 3.5–5.1)
PROT SERPL-MCNC: 6.7 G/DL (ref 6.4–8.2)
RBC # BLD AUTO: 4.41 M/UL (ref 3.8–5.2)
SODIUM SERPL-SCNC: 140 MMOL/L (ref 136–145)
TRIGL SERPL-MCNC: 52 MG/DL (ref ?–150)
VLDLC SERPL CALC-MCNC: 10.4 MG/DL
WBC # BLD AUTO: 5.6 K/UL (ref 3.6–11)

## 2021-06-18 PROCEDURE — 99213 OFFICE O/P EST LOW 20 MIN: CPT | Performed by: NURSE PRACTITIONER

## 2021-06-18 NOTE — PROGRESS NOTES
[address] [facility] Note  Subjective:      Rachel Bah is a 56 y.o. female who presents for follow up on chronic problems, she has history of hypertension, hyperlipidemia . She is due for colonoscopy and pap. She states that she just had protein shake. She doesn't want to do colonoscopy but will schedule pap next time  Past Medical History:   Diagnosis Date    Abnormal Pap smear     h/o    Allergic rhinitis due to other allergen     Arrhythmia     PAC'S - CHAMOMILLE TEA CAUSES    Esophageal reflux     Herpes zoster     Hypertension     Mixed hyperlipidemia     Nausea & vomiting     Osteopenia     Other atopic dermatitis and related conditions     Shingles rash 10/2015    small spot on left shoulder    Unspecified spontaneous  without mention of complication     X2     Past Surgical History:   Procedure Laterality Date    COLPOSCOPY  29 y/o    with cryo    DILATION AND CURETTAGE      HX COLONOSCOPY      HX WRIST FRACTURE TX Right     WRIST REPAIR       Current Outpatient Medications   Medication Sig Dispense Refill    losartan (COZAAR) 50 mg tablet TAKE 1 TABLET BY MOUTH EVERY DAY 30 Tablet 0    dextrose 5% solp 250 mL with CARBOplatin 10 mg/mL soln 1,500 mg, PO, bid, Gummies, 0 Refills      cholecalciferol, vitamin D3, 2,000 unit tab Take 2,000 Units by mouth daily.  B.infantis-B.ani-B.long-B.bifi (PROBIOTIC 4X) 10-15 mg TbEC Take 1 Tab by mouth.  CALCIUM CARBONATE/VITAMIN D3 (CALCIUM + D PO) Take 1 Tab by mouth daily. Total 770 mg daily calcium and 1000 units vitamin d       MULTIVITAMIN PO Take 1 Tab by mouth daily.  verapamiL (CALAN) 40 mg tablet Take 2 tablets by mouth nightly X 1 week .  Then drop to 40 mg nightly X 1 week then stop (Patient not taking: Reported on 2021) 21 Tab 0    acetaminophen/diphenhydramine (TYLENOL PM PO) 1/2 tab, PO, bedtime, x2 weekly PRN, 0 Refills (Patient not taking: Reported on 2021)      aspirin/acetaminophen/caffeine (EXCEDRIN MIGRAINE PO) = 2 tab, PO, PRN: Migraine headache, x2 weekly PRN, Easyscript (Patient not taking: Reported on 6/18/2021)      acetaminophen (TYLENOL) 500 mg tablet 500 mg = 1 tab each dose, PO, every 4 hours, PRN: for pain, x2 weekly PRN, # 60 tab, 0 Refills (Patient not taking: Reported on 6/18/2021)      fexofenadine HCl (ALLEGRA PO) Take  by mouth. (Patient not taking: Reported on 6/18/2021)      LYSINE PO Take  by mouth daily. (Patient not taking: Reported on 6/18/2021)       No Known Allergies    ROS:   Complete review of systems was reviewed with pertinent information listed in HPI. Review of Systems   Constitutional: Negative. HENT: Negative. Respiratory: Negative. Cardiovascular: Negative. Gastrointestinal: Negative. Genitourinary: Negative. Objective:     Visit Vitals  /72 (BP 1 Location: Left upper arm, BP Patient Position: Sitting, BP Cuff Size: Adult)   Pulse 89   Temp 97.3 °F (36.3 °C) (Temporal)   Resp 18   Ht 5' 6\" (1.676 m)   Wt 120 lb 3.2 oz (54.5 kg)   SpO2 99%   BMI 19.40 kg/m²       Vitals and Nurse Documentation reviewed. Physical Exam  Constitutional:       Appearance: Normal appearance. HENT:      Mouth/Throat:      Mouth: Mucous membranes are moist.   Cardiovascular:      Rate and Rhythm: Normal rate and regular rhythm. Pulses: Normal pulses. Heart sounds: Normal heart sounds. No murmur heard. Pulmonary:      Effort: Pulmonary effort is normal.      Breath sounds: Normal breath sounds. Abdominal:      General: Bowel sounds are normal.      Palpations: Abdomen is soft. Musculoskeletal:      Cervical back: Normal range of motion and neck supple. Neurological:      Mental Status: She is alert. Psychiatric:         Mood and Affect: Mood normal.         Thought Content: Thought content normal.         Assessment/Plan:     Diagnoses and all orders for this visit:    1.  Essential hypertension with goal blood pressure less than 140/90  -     CBC W/O DIFF; Future  -     METABOLIC PANEL, COMPREHENSIVE; Future    2. Hyperlipidemia LDL goal <130  -     LIPID PANEL;  Future    follow up in november, December for pap and blood work      Pt expressed understanding with the diagnosis and plan        Discussed expected course/resolution/complications of diagnosis in detail with patient.    Medication risks/benefits/costs/interactions/alternatives discussed with patient.    Pt was given an after visit summary which includes diagnoses, current medications & vitals.  Pt expressed understanding with the diagnosis and plan

## 2021-06-18 NOTE — PROGRESS NOTES
Identified pt with two pt identifiers(name and ). Reviewed record in preparation for visit and have obtained necessary documentation. Chief Complaint   Patient presents with    Medication Refill        Vitals:    21 1127   Weight: 120 lb 3.2 oz (54.5 kg)   Height: 5' 6\" (1.676 m)   PainSc:   0 - No pain       Health Maintenance Due   Topic    COVID-19 Vaccine (1)    Shingrix Vaccine Age 50> (1 of 2)    Colorectal Cancer Screening Combo     PAP AKA CERVICAL CYTOLOGY        Coordination of Care Questionnaire:  :   1) Have you been to an emergency room, urgent care, or hospitalized since your last visit? If yes, where when, and reason for visit? no       2. Have seen or consulted any other health care provider since your last visit? If yes, where when, and reason for visit? YES Dr. Surekha Thayer 3/21Neuro      Patient is accompanied by self I have received verbal consent from Neel Wright to discuss any/all medical information while they are present in the room.

## 2021-06-19 DIAGNOSIS — E78.5 HYPERLIPIDEMIA LDL GOAL <130: Primary | ICD-10-CM

## 2021-06-19 RX ORDER — LOVASTATIN 10 MG/1
10 TABLET ORAL
Qty: 90 TABLET | Refills: 0 | Status: SHIPPED | OUTPATIENT
Start: 2021-06-19 | End: 2021-09-12

## 2021-06-21 ENCOUNTER — TELEPHONE (OUTPATIENT)
Dept: FAMILY MEDICINE CLINIC | Age: 61
End: 2021-06-21

## 2021-06-28 RX ORDER — LOSARTAN POTASSIUM 50 MG/1
TABLET ORAL
Qty: 30 TABLET | Refills: 0 | Status: SHIPPED | OUTPATIENT
Start: 2021-06-28 | End: 2021-07-28

## 2021-07-28 RX ORDER — LOSARTAN POTASSIUM 50 MG/1
TABLET ORAL
Qty: 30 TABLET | Refills: 0 | Status: SHIPPED | OUTPATIENT
Start: 2021-07-28 | End: 2021-08-29

## 2021-08-29 RX ORDER — LOSARTAN POTASSIUM 50 MG/1
TABLET ORAL
Qty: 30 TABLET | Refills: 0 | Status: SHIPPED | OUTPATIENT
Start: 2021-08-29 | End: 2021-09-22

## 2021-09-12 DIAGNOSIS — E78.5 HYPERLIPIDEMIA LDL GOAL <130: ICD-10-CM

## 2021-09-12 RX ORDER — LOVASTATIN 10 MG/1
TABLET ORAL
Qty: 90 TABLET | Refills: 0 | Status: SHIPPED | OUTPATIENT
Start: 2021-09-12 | End: 2021-11-02

## 2021-09-22 RX ORDER — LOSARTAN POTASSIUM 50 MG/1
TABLET ORAL
Qty: 30 TABLET | Refills: 0 | Status: SHIPPED | OUTPATIENT
Start: 2021-09-22 | End: 2021-10-17 | Stop reason: SDUPTHER

## 2021-10-17 RX ORDER — LOSARTAN POTASSIUM 50 MG/1
TABLET ORAL
Qty: 30 TABLET | Refills: 0 | Status: SHIPPED | OUTPATIENT
Start: 2021-10-17 | End: 2021-11-02

## 2021-11-02 ENCOUNTER — OFFICE VISIT (OUTPATIENT)
Dept: FAMILY MEDICINE CLINIC | Age: 61
End: 2021-11-02
Payer: COMMERCIAL

## 2021-11-02 ENCOUNTER — HOSPITAL ENCOUNTER (OUTPATIENT)
Dept: LAB | Age: 61
Discharge: HOME | End: 2021-11-02
Payer: COMMERCIAL

## 2021-11-02 VITALS
WEIGHT: 119 LBS | HEIGHT: 66 IN | TEMPERATURE: 97.3 F | BODY MASS INDEX: 19.13 KG/M2 | OXYGEN SATURATION: 96 % | HEART RATE: 78 BPM | DIASTOLIC BLOOD PRESSURE: 72 MMHG | SYSTOLIC BLOOD PRESSURE: 113 MMHG

## 2021-11-02 DIAGNOSIS — E78.5 HYPERLIPIDEMIA LDL GOAL <130: ICD-10-CM

## 2021-11-02 DIAGNOSIS — Z01.419 WELL WOMAN EXAM WITH ROUTINE GYNECOLOGICAL EXAM: Primary | ICD-10-CM

## 2021-11-02 DIAGNOSIS — I10 ESSENTIAL HYPERTENSION WITH GOAL BLOOD PRESSURE LESS THAN 140/90: ICD-10-CM

## 2021-11-02 LAB
ALBUMIN SERPL-MCNC: 3.7 G/DL (ref 3.5–5)
ALBUMIN/GLOB SERPL: 1.2 {RATIO} (ref 1.1–2.2)
ALP SERPL-CCNC: 59 U/L (ref 45–117)
ALT SERPL-CCNC: 22 U/L (ref 12–78)
ANION GAP SERPL CALC-SCNC: 2 MMOL/L (ref 5–15)
AST SERPL-CCNC: 16 U/L (ref 15–37)
BILIRUB SERPL-MCNC: 0.5 MG/DL (ref 0.2–1)
BUN SERPL-MCNC: 13 MG/DL (ref 6–20)
BUN/CREAT SERPL: 20 (ref 12–20)
CALCIUM SERPL-MCNC: 9.1 MG/DL (ref 8.5–10.1)
CHLORIDE SERPL-SCNC: 104 MMOL/L (ref 97–108)
CHOLEST SERPL-MCNC: 229 MG/DL
CO2 SERPL-SCNC: 29 MMOL/L (ref 21–32)
CREAT SERPL-MCNC: 0.65 MG/DL (ref 0.55–1.02)
ERYTHROCYTE [DISTWIDTH] IN BLOOD BY AUTOMATED COUNT: 12.9 % (ref 11.5–14.5)
GLOBULIN SER CALC-MCNC: 3.1 G/DL (ref 2–4)
GLUCOSE SERPL-MCNC: 91 MG/DL (ref 65–100)
HCT VFR BLD AUTO: 40.4 % (ref 35–47)
HDLC SERPL-MCNC: 66 MG/DL
HDLC SERPL: 3.5 {RATIO} (ref 0–5)
HGB BLD-MCNC: 12.7 G/DL (ref 11.5–16)
LDLC SERPL CALC-MCNC: 147.4 MG/DL (ref 0–100)
MCH RBC QN AUTO: 29.6 PG (ref 26–34)
MCHC RBC AUTO-ENTMCNC: 31.4 G/DL (ref 30–36.5)
MCV RBC AUTO: 94.2 FL (ref 80–99)
NRBC # BLD: 0 K/UL (ref 0–0.01)
NRBC BLD-RTO: 0 PER 100 WBC
PLATELET # BLD AUTO: 224 K/UL (ref 150–400)
PMV BLD AUTO: 9.3 FL (ref 8.9–12.9)
POTASSIUM SERPL-SCNC: 4.3 MMOL/L (ref 3.5–5.1)
PROT SERPL-MCNC: 6.8 G/DL (ref 6.4–8.2)
RBC # BLD AUTO: 4.29 M/UL (ref 3.8–5.2)
SODIUM SERPL-SCNC: 135 MMOL/L (ref 136–145)
TRIGL SERPL-MCNC: 78 MG/DL (ref ?–150)
VLDLC SERPL CALC-MCNC: 15.6 MG/DL
WBC # BLD AUTO: 5.1 K/UL (ref 3.6–11)

## 2021-11-02 PROCEDURE — 87491 CHLMYD TRACH DNA AMP PROBE: CPT

## 2021-11-02 PROCEDURE — 88175 CYTOPATH C/V AUTO FLUID REDO: CPT

## 2021-11-02 PROCEDURE — 99396 PREV VISIT EST AGE 40-64: CPT | Performed by: NURSE PRACTITIONER

## 2021-11-02 RX ORDER — UREA 10 %
LOTION (ML) TOPICAL
COMMUNITY
Start: 2021-06-21

## 2021-11-02 RX ORDER — LOSARTAN POTASSIUM 50 MG/1
50 TABLET ORAL DAILY
Qty: 90 TABLET | Refills: 1 | Status: SHIPPED | OUTPATIENT
Start: 2021-11-02 | End: 2022-05-06

## 2021-11-02 NOTE — PROGRESS NOTES
Room:     Identified pt with two pt identifiers(name and ). Reviewed record in preparation for visit and have obtained necessary documentation. All patient medications has been reviewed. Chief Complaint   Patient presents with    Well Woman     has rectalseal;        Health Maintenance Due   Topic    Shingrix Vaccine Age 49> (1 of 2)    Colorectal Cancer Screening Combo     Flu Vaccine (1)       Vitals:    21 1131   BP: 113/72   Pulse: 78   Temp: 97.3 °F (36.3 °C)   TempSrc: Temporal   SpO2: 96%   Weight: 119 lb (54 kg)   Height: 5' 5.5\" (1.664 m)   PainSc:   0 - No pain       4. Have you been to the ER, urgent care clinic since your last visit? Hospitalized since your last visit? No    5. Have you seen or consulted any other health care providers outside of the [address] [place] [address] since your last visit? Include any pap smears or colon screening. No    6. Would you like to receive your flu shot today? NO    8. Do you have an Advanced Directive/ Living Will in place? NO  If yes, do we have a copy on file NO  If no, would you like information NO    Patient is accompanied by self I have received verbal consent from Xavi Strange to discuss any/all medical information while they are present in the room.

## 2021-11-02 NOTE — PROGRESS NOTES
Subjective:   56 y.o. female for Well Woman Check. No LMP recorded. Patient has had a hysterectomy. Social History: has sex with males. Pertinent past medical hstory: . Patient Active Problem List   Diagnosis Code    GERD (gastroesophageal reflux disease) K21.9    AR (allergic rhinitis) J30.9    Osteoporosis, post-menopausal M81.0    Hyperlipidemia E78.5    Essential hypertension I10    Pelvic mass R19.00     Patient Active Problem List    Diagnosis Date Noted    Pelvic mass 08/01/2018    Hyperlipidemia 02/24/2016    Essential hypertension 02/24/2016    Osteoporosis, post-menopausal 10/01/2015    GERD (gastroesophageal reflux disease) 10/26/2010    AR (allergic rhinitis) 10/26/2010     Current Outpatient Medications   Medication Sig Dispense Refill    cyanocobalamin (Vitamin B-12) 100 mcg tablet 0 Refills      losartan (COZAAR) 50 mg tablet Take 1 Tablet by mouth daily. 90 Tablet 1    LYSINE PO Take  by mouth daily.  cholecalciferol, vitamin D3, 2,000 unit tab Take 2,000 Units by mouth daily.  B.infantis-B.ani-B.long-B.bifi (PROBIOTIC 4X) 10-15 mg TbEC Take 1 Tab by mouth.  CALCIUM CARBONATE/VITAMIN D3 (CALCIUM + D PO) Take 1 Tab by mouth daily. Total 770 mg daily calcium and 1000 units vitamin d       MULTIVITAMIN PO Take 1 Tab by mouth daily.  dextrose 5% solp 250 mL with CARBOplatin 10 mg/mL soln 1,500 mg, PO, bid, Gummies, 0 Refills (Patient not taking: Reported on 11/2/2021)      fexofenadine HCl (ALLEGRA PO) Take  by mouth.  (Patient not taking: Reported on 6/18/2021)       No Known Allergies  Past Medical History:   Diagnosis Date    Abnormal Pap smear     h/o    Allergic rhinitis due to other allergen     Arrhythmia     PAC'S - CHAMOMILLE TEA CAUSES    Esophageal reflux     Herpes zoster     Hypertension     Mixed hyperlipidemia     Nausea & vomiting     Osteopenia     Other atopic dermatitis and related conditions     Shingles rash 10/2015    small spot on left shoulder    Unspecified spontaneous  without mention of complication     X2     Past Surgical History:   Procedure Laterality Date    COLPOSCOPY  31 y/o    with cryo    DILATION AND CURETTAGE      HX COLONOSCOPY      HX WRIST FRACTURE TX Right     WRIST REPAIR     Family History   Problem Relation Age of Onset    Hypertension Mother     Heart Disease Mother         CHF    Cancer Mother         kidney    Heart Attack Father     Coronary Artery Disease Father         with pacemaker    Asthma Father     Heart Disease Father         pacemaker    Stroke Maternal Grandmother     Heart Attack Paternal Grandfather     Anesth Problems Neg Hx      Social History     Tobacco Use    Smoking status: Never Smoker    Smokeless tobacco: Never Used   Substance Use Topics    Alcohol use: No     Alcohol/week: 0.8 standard drinks     Types: 1 Glasses of wine per week        ROS:  Feeling well. No dyspnea or chest pain on exertion. No abdominal pain, change in bowel habits, black or bloody stools. No urinary tract symptoms. GYN ROS: normal menses, no abnormal bleeding, pelvic pain or discharge, no breast pain or new or enlarging lumps on self exam. No neurological complaints. Objective:     Visit Vitals  /72 (BP 1 Location: Left upper arm, BP Patient Position: Sitting)   Pulse 78   Temp 97.3 °F (36.3 °C) (Temporal)   Ht 5' 5.5\" (1.664 m)   Wt 119 lb (54 kg)   SpO2 96%   BMI 19.50 kg/m²     The patient appears well, alert, oriented x 3, in no distress. ENT normal.  Neck supple. No adenopathy or thyromegaly. JESUS. Lungs are clear, good air entry, no wheezes, rhonchi or rales. S1 and S2 normal, no murmurs, regular rate and rhythm. Abdomen soft without tenderness, guarding, mass or organomegaly. Extremities show no edema, normal peripheral pulses. Neurological is normal, no focal findings.     BREAST EXAM: breasts appear normal, no suspicious masses, no skin or nipple changes or axillary nodes    PELVIC EXAM: VULVA: normal appearing vulva with no masses, tenderness or lesions, VAGINA: normal appearing vagina with normal color and discharge, no lesions    Assessment/Plan:   well woman  pap smear  return annually or prn    ICD-10-CM ICD-9-CM    1. Well woman exam with routine gynecological exam  Z01.419 V72.31 PAP IG, CT-NG, RFX APTIMA HPV ASCUS ([id number], [id number])   2. Hyperlipidemia LDL goal <130  E78.5 272.4 LIPID PANEL      METABOLIC PANEL, COMPREHENSIVE      LIPID PANEL      METABOLIC PANEL, COMPREHENSIVE   3. Essential hypertension with goal blood pressure less than 140/90  I10 401.9 CBC W/O DIFF      losartan (COZAAR) 50 mg tablet      CBC W/O DIFF   .

## 2021-11-05 LAB
C TRACH RRNA SPEC QL NAA+PROBE: NEGATIVE
N GONORRHOEA RRNA SPEC QL NAA+PROBE: NEGATIVE
SPECIMEN SOURCE: NORMAL

## 2021-12-27 ENCOUNTER — TRANSCRIBE ORDER (OUTPATIENT)
Dept: SCHEDULING | Age: 61
End: 2021-12-27

## 2021-12-27 DIAGNOSIS — Z12.31 SCREENING MAMMOGRAM FOR HIGH-RISK PATIENT: Primary | ICD-10-CM

## 2022-01-20 ENCOUNTER — HOSPITAL ENCOUNTER (OUTPATIENT)
Dept: MAMMOGRAPHY | Age: 62
Discharge: HOME | End: 2022-01-20
Payer: COMMERCIAL

## 2022-01-20 DIAGNOSIS — Z12.31 SCREENING MAMMOGRAM FOR HIGH-RISK PATIENT: ICD-10-CM

## 2022-01-20 PROCEDURE — 77067 SCR MAMMO BI INCL CAD: CPT

## 2022-02-23 ENCOUNTER — HOSPITAL ENCOUNTER (OUTPATIENT)
Dept: MAMMOGRAPHY | Age: 62
Discharge: HOME | End: 2022-02-23
Payer: COMMERCIAL

## 2022-02-23 DIAGNOSIS — R92.8 ABNORMAL MAMMOGRAM OF LEFT BREAST: ICD-10-CM

## 2022-02-23 PROCEDURE — 77061 BREAST TOMOSYNTHESIS UNI: CPT

## 2022-03-18 PROBLEM — R19.00 PELVIC MASS: Status: ACTIVE | Noted: 2018-08-01

## 2022-05-06 DIAGNOSIS — I10 ESSENTIAL HYPERTENSION WITH GOAL BLOOD PRESSURE LESS THAN 140/90: ICD-10-CM

## 2022-05-06 RX ORDER — LOSARTAN POTASSIUM 50 MG/1
TABLET ORAL
Qty: 90 TABLET | Refills: 1 | Status: SHIPPED | OUTPATIENT
Start: 2022-05-06 | End: 2022-07-12

## 2022-07-11 ENCOUNTER — NURSE TRIAGE (OUTPATIENT)
Dept: OTHER | Facility: CLINIC | Age: 62
End: 2022-07-11

## 2022-07-11 NOTE — TELEPHONE ENCOUNTER
Received call from [place] at [facility] with Red Flag Complaint. Subjective: Caller states \"When I am home, I just want to lay down. I have no energy. I am having a lot of fatigue. The pins and needles in my feet has been going on for a couple of months. It comes and goes but is here more now. I wonder if it may be the losartan. My BP has been staying at or below 120/80. \"    Current Symptoms: pins and needles in feet    Onset: 2 months    Associated Symptoms: fatigue, lightheadedness    Pain Severity: 4/10    Temperature: denies fever    What has been tried: NA    LMP: NA Pregnant: NA    Recommended disposition: See PCP within 3 Days    Care advice provided, patient verbalizes understanding; denies any other questions or concerns; instructed to call back for any new or worsening symptoms. Patient agrees with disposition. Message sent to [facility] [place] for appointment scheduling. Attention Provider: Thank you for allowing me to participate in the care of your patient. The patient was connected to triage in response to information provided to the [facility]. Please do not respond through this encounter as the response is not directed to a shared pool.     Reason for Disposition   Numbness or tingling in one or both feet is a chronic symptom (recurrent or ongoing problem lasting > 4 weeks)    Protocols used: NEUROLOGIC DEFICIT-ADULT-[place]

## 2022-07-12 ENCOUNTER — OFFICE VISIT (OUTPATIENT)
Dept: FAMILY MEDICINE CLINIC | Age: 62
End: 2022-07-12
Payer: COMMERCIAL

## 2022-07-12 VITALS
OXYGEN SATURATION: 97 % | SYSTOLIC BLOOD PRESSURE: 118 MMHG | HEIGHT: 66 IN | HEART RATE: 91 BPM | RESPIRATION RATE: 16 BRPM | BODY MASS INDEX: 20.41 KG/M2 | TEMPERATURE: 97.9 F | DIASTOLIC BLOOD PRESSURE: 84 MMHG | WEIGHT: 127 LBS

## 2022-07-12 DIAGNOSIS — R53.83 TIRED: ICD-10-CM

## 2022-07-12 DIAGNOSIS — I10 ESSENTIAL HYPERTENSION WITH GOAL BLOOD PRESSURE LESS THAN 140/90: Primary | ICD-10-CM

## 2022-07-12 DIAGNOSIS — E78.5 HYPERLIPIDEMIA LDL GOAL <130: ICD-10-CM

## 2022-07-12 DIAGNOSIS — I83.93 ASYMPTOMATIC VARICOSE VEINS OF BOTH LOWER EXTREMITIES: ICD-10-CM

## 2022-07-12 DIAGNOSIS — E55.9 VITAMIN D DEFICIENCY: ICD-10-CM

## 2022-07-12 DIAGNOSIS — G43.001 MIGRAINE WITHOUT AURA AND WITH STATUS MIGRAINOSUS, NOT INTRACTABLE: ICD-10-CM

## 2022-07-12 PROCEDURE — 99214 OFFICE O/P EST MOD 30 MIN: CPT | Performed by: NURSE PRACTITIONER

## 2022-07-12 RX ORDER — LOSARTAN POTASSIUM 25 MG/1
25 TABLET ORAL DAILY
Qty: 90 TABLET | Refills: 0 | Status: SHIPPED | OUTPATIENT
Start: 2022-07-12

## 2022-07-12 NOTE — PROGRESS NOTES
[address] [facility] Note  Subjective:      Garth Lai is a 59 y.o. female who presents for follow up on chronic problems. She has history of hypertension, varicose veins and and migraine headaches. She states that at times she has been feeling dizzy and believes that her  blood pressures are going too low. Today her BP is 118/84, she takes Losartan 50 mg daily, will decrease it to 25 mg. For her migraine headache she takes CBD oil and gets Botox injections from neurologist at [employer], where she works. 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 Doesn't want to see a vascular surgeon  She C/O feeling fatigue and that takes nap when she is at home. Requesting to check her vitamin D levels     Past Medical History:   Diagnosis Date    Abnormal Pap smear     h/o    Allergic rhinitis due to other allergen     Arrhythmia     PAC'S - CHAMOMILLE TEA CAUSES    Esophageal reflux     Herpes zoster     Hypertension     Mixed hyperlipidemia     Nausea & vomiting     Osteopenia     Other atopic dermatitis and related conditions     Shingles rash 10/2015    small spot on left shoulder    Unspecified spontaneous  without mention of complication     X2     Past Surgical History:   Procedure Laterality Date    COLPOSCOPY  31 y/o    with cryo    DILATION AND CURETTAGE      HX COLONOSCOPY      HX WRIST FRACTURE TX Right     WRIST REPAIR       Current Outpatient Medications   Medication Sig Dispense Refill    losartan (COZAAR) 25 mg tablet Take 1 Tablet by mouth daily. 90 Tablet 0    cyanocobalamin (Vitamin B-12) 100 mcg tablet 0 Refills      LYSINE PO Take  by mouth daily.  cholecalciferol, vitamin D3, 2,000 unit tab Take 2,000 Units by mouth daily.  B.infantis-B.ani-B.long-B.bifi (PROBIOTIC 4X) 10-15 mg TbEC Take 1 Tab by mouth.  MULTIVITAMIN PO Take 1 Tab by mouth daily.       dextrose 5% solp 250 mL with CARBOplatin 10 mg/mL soln 1,500 mg, PO, bid, Gummies, 0 Refills (Patient not taking: Reported on 11/2/2021)      fexofenadine HCl (ALLEGRA PO) Take  by mouth. (Patient not taking: Reported on 6/18/2021)      CALCIUM CARBONATE/VITAMIN D3 (CALCIUM + D PO) Take 1 Tab by mouth daily. Total 770 mg daily calcium and 1000 units vitamin d  (Patient not taking: Reported on 7/12/2022)       No Known Allergies    ROS:   Complete review of systems was reviewed with pertinent information listed in HPI. Review of Systems   Constitutional: Positive for malaise/fatigue. HENT: Negative. Respiratory: Negative. Cardiovascular: Negative. Gastrointestinal: Negative. Genitourinary: Negative. Musculoskeletal: Negative. Objective:     Visit Vitals  /84 (BP 1 Location: Left arm, BP Patient Position: Sitting, BP Cuff Size: Adult)   Pulse 91   Temp 97.9 °F (36.6 °C) (Temporal)   Resp 16   Ht 5' 5.5\" (1.664 m)   Wt 127 lb (57.6 kg)   SpO2 97%   BMI 20.81 kg/m²       Vitals and Nurse Documentation reviewed. Physical Exam  Constitutional:       Appearance: Normal appearance. HENT:      Mouth/Throat:      Mouth: Mucous membranes are moist.   Cardiovascular:      Rate and Rhythm: Normal rate and regular rhythm. Pulses: Normal pulses. Heart sounds: Normal heart sounds. Comments: Varicose veins in both lower ext. Pulmonary:      Effort: Pulmonary effort is normal.      Breath sounds: Normal breath sounds. Abdominal:      General: Bowel sounds are normal.      Palpations: Abdomen is soft. Musculoskeletal:      Cervical back: Normal range of motion and neck supple. Neurological:      Mental Status: She is alert. Psychiatric:         Mood and Affect: Mood normal.         Thought Content: Thought content normal.         Assessment/Plan:     Diagnoses and all orders for this visit[de-identified]    1. Essential hypertension with goal blood pressure less than 269/87  -     METABOLIC PANEL, COMPREHENSIVE;  Future  -     CBC W/O DIFF; Future  -     losartan (COZAAR) 25 mg tablet; Take 1 Tablet by mouth daily. 2. Hyperlipidemia LDL goal <130  -     LIPID PANEL; Future    3. Tired      Take MVI and rest    4. Vitamin D deficiency  -     VITAMIN D, 25 HYDROXY; Future    5. Asymptomatic varicose veins of both lower extremities     Use support hose    6.  Migraine without aura and with status migrainosus, not intractable       Follow up with neurologist          Pt expressed understanding with the diagnosis and plan        Discussed expected course/resolution/complications of diagnosis in detail with patient.    Medication risks/benefits/costs/interactions/alternatives discussed with patient.    Pt was given an after visit summary which includes diagnoses, current medications & vitals.  Pt expressed understanding with the diagnosis and plan

## 2022-07-12 NOTE — PROGRESS NOTES
Chief Complaint   Patient presents with    Fatigue    Headache    Foot Pain     1. Have you been to the ER, urgent care clinic since your last visit? Hospitalized since your last visit? No    2. Have you seen or consulted any other health care providers outside of the [address] [place] [address] since your last visit? Include any pap smears or colon screening.  Yes When: [facility] for migrain

## 2022-07-13 LAB
25(OH)D3 SERPL-MCNC: 52.6 NG/ML (ref 30–100)
ALBUMIN SERPL-MCNC: 3.8 G/DL (ref 3.5–5)
ALBUMIN/GLOB SERPL: 1.3 {RATIO} (ref 1.1–2.2)
ALP SERPL-CCNC: 60 U/L (ref 45–117)
ALT SERPL-CCNC: 22 U/L (ref 12–78)
ANION GAP SERPL CALC-SCNC: 4 MMOL/L (ref 5–15)
AST SERPL-CCNC: 20 U/L (ref 15–37)
BILIRUB SERPL-MCNC: 0.4 MG/DL (ref 0.2–1)
BUN SERPL-MCNC: 17 MG/DL (ref 6–20)
BUN/CREAT SERPL: 21 (ref 12–20)
CALCIUM SERPL-MCNC: 9.2 MG/DL (ref 8.5–10.1)
CHLORIDE SERPL-SCNC: 105 MMOL/L (ref 97–108)
CHOLEST SERPL-MCNC: 224 MG/DL
CO2 SERPL-SCNC: 29 MMOL/L (ref 21–32)
CREAT SERPL-MCNC: 0.82 MG/DL (ref 0.55–1.02)
ERYTHROCYTE [DISTWIDTH] IN BLOOD BY AUTOMATED COUNT: 12.7 % (ref 11.5–14.5)
GLOBULIN SER CALC-MCNC: 3 G/DL (ref 2–4)
GLUCOSE SERPL-MCNC: 98 MG/DL (ref 65–100)
HCT VFR BLD AUTO: 41.6 % (ref 35–47)
HDLC SERPL-MCNC: 70 MG/DL
HDLC SERPL: 3.2 {RATIO} (ref 0–5)
HGB BLD-MCNC: 13.6 G/DL (ref 11.5–16)
LDLC SERPL CALC-MCNC: 133.6 MG/DL (ref 0–100)
MCH RBC QN AUTO: 30.6 PG (ref 26–34)
MCHC RBC AUTO-ENTMCNC: 32.7 G/DL (ref 30–36.5)
MCV RBC AUTO: 93.5 FL (ref 80–99)
NRBC # BLD: 0 K/UL (ref 0–0.01)
NRBC BLD-RTO: 0 PER 100 WBC
PLATELET # BLD AUTO: 213 K/UL (ref 150–400)
PMV BLD AUTO: 9.2 FL (ref 8.9–12.9)
POTASSIUM SERPL-SCNC: 4.6 MMOL/L (ref 3.5–5.1)
PROT SERPL-MCNC: 6.8 G/DL (ref 6.4–8.2)
RBC # BLD AUTO: 4.45 M/UL (ref 3.8–5.2)
SODIUM SERPL-SCNC: 138 MMOL/L (ref 136–145)
TRIGL SERPL-MCNC: 102 MG/DL (ref ?–150)
VLDLC SERPL CALC-MCNC: 20.4 MG/DL
WBC # BLD AUTO: 6.3 K/UL (ref 3.6–11)

## 2022-09-12 ENCOUNTER — OFFICE VISIT (OUTPATIENT)
Dept: URGENT CARE | Age: 62
End: 2022-09-12
Payer: COMMERCIAL

## 2022-09-12 VITALS
BODY MASS INDEX: 20.32 KG/M2 | RESPIRATION RATE: 16 BRPM | OXYGEN SATURATION: 99 % | WEIGHT: 124 LBS | SYSTOLIC BLOOD PRESSURE: 150 MMHG | TEMPERATURE: 98.1 F | HEART RATE: 83 BPM | DIASTOLIC BLOOD PRESSURE: 92 MMHG

## 2022-09-12 DIAGNOSIS — R19.5 LOOSE STOOLS: ICD-10-CM

## 2022-09-12 DIAGNOSIS — R19.4 CHANGE IN STOOL HABITS: Primary | ICD-10-CM

## 2022-09-12 PROCEDURE — 99203 OFFICE O/P NEW LOW 30 MIN: CPT | Performed by: NURSE PRACTITIONER

## 2022-09-12 NOTE — LETTER
NOTIFICATION RETURN TO WORK / SCHOOL    9/12/2022 5:24 PM    Ms. Jodie Ingram [address] [place] [id number]      To Whom It May Concern:    Alonso Stewart is currently under the care of [address]. Please excuse from work    She will return to work/school on: 09/14/2022    If there are questions or concerns please have the patient contact our office.         Sincerely,      G[facility] PROVIDER

## 2022-09-12 NOTE — PROGRESS NOTES
Here for loose stool  Self diagnosed IBS; intermittent loose stool/diarrhea elicited with particular food intake  This is a chronic issue going on for years  Recent change within past 1 month- worsening urgency for having BM  Diarrhea is loose to watery without any blood  There hasnt been signifiant abdominal pain  No vomiting  Symptmos occur daily. Not resovled with low FODMAP diet she has tried over past few days.   She has not tried any medications  No weight loss  Last colonoscopy was in her mid 45s         Past Medical History:   Diagnosis Date    Abnormal Pap smear     h/o    Allergic rhinitis due to other allergen     Arrhythmia     PAC'S - CHAMOMILLE TEA CAUSES    Esophageal reflux     Herpes zoster     Hypertension     Mixed hyperlipidemia     Nausea & vomiting     Osteopenia     Other atopic dermatitis and related conditions     Shingles rash 10/2015    small spot on left shoulder    Unspecified spontaneous  without mention of complication     X2        Past Surgical History:   Procedure Laterality Date    COLPOSCOPY  31 y/o    with cryo    DILATION AND CURETTAGE      HX COLONOSCOPY      HX WRIST FRACTURE TX Right     WRIST REPAIR         Family History   Problem Relation Age of Onset    Hypertension Mother     Heart Disease Mother         CHF    Cancer Mother         kidney    Heart Attack Father     Coronary Art Dis Father         with pacemaker    Asthma Father     Heart Disease Father         pacemaker    Stroke Maternal Grandmother     Heart Attack Paternal Grandfather     Anesth Problems Neg Hx         Social History     Socioeconomic History    Marital status:      Spouse name: Sancho Pay    Number of children: 2    Years of education: Not on file    Highest education level: Not on file   Occupational History    Occupation: Nurse     Employer: [id number]   Tobacco Use    Smoking status: Never    Smokeless tobacco: Never   Substance and Sexual Activity    Alcohol use: No     Alcohol/week: 0.8 standard drinks     Types: 1 Glasses of wine per week    Drug use: No    Sexual activity: Yes     Partners: Male   Other Topics Concern    Not on file   Social History Narrative    Not on file     Social Determinants of Health     Financial Resource Strain: Not on file   Food Insecurity: Not on file   Transportation Needs: Not on file   Physical Activity: Not on file   Stress: Not on file   Social Connections: Not on file   Intimate Partner Violence: Not on file   Housing Stability: Not on file                ALLERGIES: Patient has no known allergies. Review of Systems   All other systems reviewed and are negative. Vitals:    09/12/22 1713   BP: (!) 150/92   Pulse: 83   Resp: 16   Temp: 98.1 °F (36.7 °C)   SpO2: 99%   Weight: 124 lb (56.2 kg)       Physical Exam  Vitals reviewed. Constitutional:       General: She is not in acute distress. Appearance: Normal appearance. She is not ill-appearing or diaphoretic. HENT:      Mouth/Throat:      Mouth: Mucous membranes are moist.   Eyes:      Extraocular Movements: Extraocular movements intact. Cardiovascular:      Rate and Rhythm: Normal rate and regular rhythm. Pulmonary:      Effort: Pulmonary effort is normal.      Breath sounds: Normal breath sounds. Abdominal:      General: Abdomen is flat. Palpations: Abdomen is soft. Comments: Minimal mandy umbilical TTP with moderate palpation. All other quadrants soft and non tender. No organomegaly. BS active x 4 quads. Musculoskeletal:      Cervical back: Normal range of motion and neck supple. Skin:     Capillary Refill: Capillary refill takes less than 2 seconds. Neurological:      Mental Status: She is alert and oriented to person, place, and time. Psychiatric:         Mood and Affect: Mood normal.         Behavior: Behavior normal.         Thought Content:  Thought content normal.       MDM     Differential Diagnosis; Clinical Impression; Plan:       CLINICAL IMPRESSION:  (R19.4) Change in stool habits  (primary encounter diagnosis)  (R19.5) Loose stools    No orders of the defined types were placed in this encounter. Plan:  Chronic loose stool/diarrhea worse with certain foods. Self diagnosed as having IBS but never formally diagnosed by medical provider. Has had worsening over past 1 month  Today on exam- non acute abdomen. Has stable VS  Advised she follow up with PCP or GI within 1 month- likely needs colonoscopy  May use imodium as this is low suspicion of being infectious  Continue low FODMAP diet. We have reviewed concerning signs/symptoms, normal vs abnormal progression of medical condition and when to seek immediate medical attention. Schedule with PCP or Urgent Care immediately for worsening or new symptoms.               Procedures

## 2022-12-23 DIAGNOSIS — I10 ESSENTIAL HYPERTENSION WITH GOAL BLOOD PRESSURE LESS THAN 140/90: ICD-10-CM

## 2022-12-24 RX ORDER — LOSARTAN POTASSIUM 25 MG/1
TABLET ORAL
Qty: 90 TABLET | Refills: 0 | Status: SHIPPED | OUTPATIENT
Start: 2022-12-24

## 2023-01-03 ENCOUNTER — OFFICE VISIT (OUTPATIENT)
Dept: FAMILY MEDICINE CLINIC | Age: 63
End: 2023-01-03
Payer: COMMERCIAL

## 2023-01-03 VITALS
OXYGEN SATURATION: 100 % | DIASTOLIC BLOOD PRESSURE: 73 MMHG | RESPIRATION RATE: 16 BRPM | BODY MASS INDEX: 20.26 KG/M2 | TEMPERATURE: 97.6 F | WEIGHT: 121.6 LBS | HEIGHT: 65 IN | HEART RATE: 82 BPM | SYSTOLIC BLOOD PRESSURE: 112 MMHG

## 2023-01-03 DIAGNOSIS — I10 HYPERTENSION, UNSPECIFIED TYPE: Primary | ICD-10-CM

## 2023-01-03 DIAGNOSIS — E78.5 HYPERLIPIDEMIA LDL GOAL <130: ICD-10-CM

## 2023-01-03 PROCEDURE — 3074F SYST BP LT 130 MM HG: CPT | Performed by: NURSE PRACTITIONER

## 2023-01-03 PROCEDURE — 3078F DIAST BP <80 MM HG: CPT | Performed by: NURSE PRACTITIONER

## 2023-01-03 PROCEDURE — 99213 OFFICE O/P EST LOW 20 MIN: CPT | Performed by: NURSE PRACTITIONER

## 2023-01-03 RX ORDER — POLYETHYLENE GLYCOL 3350, SODIUM SULFATE, SODIUM CHLORIDE, POTASSIUM CHLORIDE, ASCORBIC ACID, SODIUM ASCORBATE 140-9-5.2G
KIT ORAL
COMMUNITY
Start: 2022-11-07

## 2023-01-03 RX ORDER — UBIDECARENONE 30 MG
30 CAPSULE ORAL DAILY
COMMUNITY

## 2023-01-03 NOTE — PROGRESS NOTES
Chief Complaint   Patient presents with    Hypertension     Follow up         1. \"Have you been to the ER, urgent care clinic since your last visit? Hospitalized since your last visit? \" Yes Urgent Care for abdominal pain  9/2022    2. \"Have you seen or consulted any other health care providers outside of the [address] [place] [address] since your last visit? \" Yes Urgent Care 9/2022      3. For patients aged 39-70: Has the patient had a colonoscopy / FIT/ Cologuard? Yes - no Care Gap present      If the patient is female:    4. For patients aged 41-77: Has the patient had a mammogram within the past 2 years? Yes - no Care Gap present      5. For patients aged 21-65: Has the patient had a pap smear?  Yes - no Care Gap present         3 most recent PHQ Screens 1/3/2023   Little interest or pleasure in doing things Not at all   Feeling down, depressed, irritable, or hopeless Not at all   Total Score PHQ 2 0   Trouble falling or staying asleep, or sleeping too much -   Feeling tired or having little energy -   Poor appetite, weight loss, or overeating -   Feeling bad about yourself - or that you are a failure or have let yourself or your family down -   Trouble concentrating on things such as school, work, reading, or watching TV -   Moving or speaking so slowly that other people could have noticed; or the opposite being so fidgety that others notice -   Thoughts of being better off dead, or hurting yourself in some way -   PHQ 9 Score -   How difficult have these problems made it for you to do your work, take care of your home and get along with others -       Health Maintenance Due   Topic Date Due    Shingles Vaccine (1 of 2) Never done    Colorectal Cancer Screening Combo  01/15/2015    COVID-19 Vaccine (3 - Booster for Sarmiento Peter series) 03/26/2021    Flu Vaccine (1) 08/01/2022

## 2023-01-03 NOTE — PROGRESS NOTES
[facility] Note  Subjective:      Hema Means is a 57 y.o. female who presents for follow up on chronic problems, she has history of hypertension, and diet controlled hyperlipidemia . Taking medications as prescribed, no medications side effects reported . Past Medical History:   Diagnosis Date    Abnormal Pap smear     h/o    Allergic rhinitis due to other allergen     Arrhythmia     PAC'S - [place] TEA CAUSES    Esophageal reflux     Herpes zoster     Hypertension     Mixed hyperlipidemia     Nausea & vomiting     Osteopenia     Other atopic dermatitis and related conditions     Shingles rash 10/2015    small spot on left shoulder    Unspecified spontaneous  without mention of complication     X2     Past Surgical History:   Procedure Laterality Date    COLPOSCOPY  31 y/o    with cryo    DILATION AND CURETTAGE      HX COLONOSCOPY      HX WRIST FRACTURE TX Right     WRIST REPAIR     Current Outpatient Medications   Medication Sig Dispense Refill    Plenvu 140-9-5.2 gram ppks TAKE AS DIRECTED BEFORE COLONOSCOPY      coenzyme Q-10 (CO Q-10) 30 mg capsule Take 30 mg by mouth daily. losartan (COZAAR) 25 mg tablet TAKE 1 TABLET BY MOUTH EVERY DAY 90 Tablet 0    cyanocobalamin (VITAMIN B12) 100 mcg tablet 0 Refills      fexofenadine HCl (ALLEGRA PO) Take  by mouth. LYSINE PO Take  by mouth daily. cholecalciferol, vitamin D3, 2,000 unit tab Take 2,000 Units by mouth daily. B.animalis,bifid,infantis,long 10-15 mg TbEC Take 1 Tab by mouth. CALCIUM CARBONATE/VITAMIN D3 (CALCIUM + D PO) Take 1 Tablet by mouth daily. Total 770 mg daily calcium and 1000 units vitamin d      MULTIVITAMIN PO Take 1 Tab by mouth daily.       dextrose 5% solp 250 mL with CARBOplatin 10 mg/mL soln 1,500 mg, PO, bid, Gummies, 0 Refills (Patient not taking: No sig reported)       No Known Allergies    ROS:   Complete review of systems was reviewed with pertinent information listed in HPI. Review of Systems   Constitutional: Negative. HENT: Negative. Respiratory: Negative. Cardiovascular: Negative. Gastrointestinal: Negative. Genitourinary: Negative. Musculoskeletal: Negative. Psychiatric/Behavioral: Negative. Objective:   Visit Vitals  /73 (BP 1 Location: Left arm, BP Patient Position: Sitting, BP Cuff Size: Adult)   Pulse 82   Temp 97.6 °F (36.4 °C) (Temporal)   Resp 16   Ht 5' 5\" (1.651 m)   Wt 121 lb 9.6 oz (55.2 kg)   SpO2 100%   BMI 20.24 kg/m²       Vitals and Nurse Documentation reviewed. Physical Exam  Constitutional:       Appearance: Normal appearance. HENT:      Mouth/Throat:      Mouth: Mucous membranes are moist.   Cardiovascular:      Rate and Rhythm: Normal rate and regular rhythm. Pulses: Normal pulses. Heart sounds: Normal heart sounds. No murmur heard. Pulmonary:      Effort: Pulmonary effort is normal.      Breath sounds: Normal breath sounds. Abdominal:      General: Bowel sounds are normal.      Palpations: Abdomen is soft. Musculoskeletal:      Cervical back: Normal range of motion and neck supple. Neurological:      Mental Status: She is alert. Psychiatric:         Mood and Affect: Mood normal.         Thought Content: Thought content normal.       Assessment/Plan:     Diagnoses and all orders for this visit:    1. Hypertension, unspecified type  -     METABOLIC PANEL, COMPREHENSIVE; Future  -     CBC W/O DIFF; Future    2. Hyperlipidemia LDL goal <130  -     LIPID PANEL; Future          Pt expressed understanding with the diagnosis and plan        Discussed expected course/resolution/complications of diagnosis in detail with patient. Medication risks/benefits/costs/interactions/alternatives discussed with patient. Pt was given an after visit summary which includes diagnoses, current medications & vitals.   Pt expressed understanding with the diagnosis and plan

## 2023-01-04 LAB
ALBUMIN SERPL-MCNC: 3.9 G/DL (ref 3.5–5)
ALBUMIN/GLOB SERPL: 1.4 {RATIO} (ref 1.1–2.2)
ALP SERPL-CCNC: 54 U/L (ref 45–117)
ALT SERPL-CCNC: 21 U/L (ref 12–78)
ANION GAP SERPL CALC-SCNC: 5 MMOL/L (ref 5–15)
AST SERPL-CCNC: 22 U/L (ref 15–37)
BILIRUB SERPL-MCNC: 0.5 MG/DL (ref 0.2–1)
BUN SERPL-MCNC: 13 MG/DL (ref 6–20)
BUN/CREAT SERPL: 17 (ref 12–20)
CALCIUM SERPL-MCNC: 9.5 MG/DL (ref 8.5–10.1)
CHLORIDE SERPL-SCNC: 103 MMOL/L (ref 97–108)
CHOLEST SERPL-MCNC: 227 MG/DL
CO2 SERPL-SCNC: 29 MMOL/L (ref 21–32)
CREAT SERPL-MCNC: 0.77 MG/DL (ref 0.55–1.02)
ERYTHROCYTE [DISTWIDTH] IN BLOOD BY AUTOMATED COUNT: 12.6 % (ref 11.5–14.5)
GLOBULIN SER CALC-MCNC: 2.7 G/DL (ref 2–4)
GLUCOSE SERPL-MCNC: 107 MG/DL (ref 65–100)
HCT VFR BLD AUTO: 39.8 % (ref 35–47)
HDLC SERPL-MCNC: 68 MG/DL
HDLC SERPL: 3.3 {RATIO} (ref 0–5)
HGB BLD-MCNC: 12.8 G/DL (ref 11.5–16)
LDLC SERPL CALC-MCNC: 146.2 MG/DL (ref 0–100)
MCH RBC QN AUTO: 29.4 PG (ref 26–34)
MCHC RBC AUTO-ENTMCNC: 32.2 G/DL (ref 30–36.5)
MCV RBC AUTO: 91.5 FL (ref 80–99)
NRBC # BLD: 0 K/UL (ref 0–0.01)
NRBC BLD-RTO: 0 PER 100 WBC
PLATELET # BLD AUTO: 196 K/UL (ref 150–400)
PMV BLD AUTO: 9.3 FL (ref 8.9–12.9)
POTASSIUM SERPL-SCNC: 3.8 MMOL/L (ref 3.5–5.1)
PROT SERPL-MCNC: 6.6 G/DL (ref 6.4–8.2)
RBC # BLD AUTO: 4.35 M/UL (ref 3.8–5.2)
SODIUM SERPL-SCNC: 137 MMOL/L (ref 136–145)
TRIGL SERPL-MCNC: 64 MG/DL (ref ?–150)
VLDLC SERPL CALC-MCNC: 12.8 MG/DL
WBC # BLD AUTO: 5.3 K/UL (ref 3.6–11)

## 2023-02-20 ENCOUNTER — ANESTHESIA (OUTPATIENT)
Dept: ENDOSCOPY | Age: 63
End: 2023-02-20
Payer: COMMERCIAL

## 2023-02-20 ENCOUNTER — ANESTHESIA EVENT (OUTPATIENT)
Dept: ENDOSCOPY | Age: 63
End: 2023-02-20
Payer: COMMERCIAL

## 2023-02-20 ENCOUNTER — HOSPITAL ENCOUNTER (OUTPATIENT)
Age: 63
Setting detail: OUTPATIENT SURGERY
Discharge: HOME | End: 2023-02-20
Attending: INTERNAL MEDICINE | Admitting: INTERNAL MEDICINE
Payer: COMMERCIAL

## 2023-02-20 VITALS
HEART RATE: 73 BPM | DIASTOLIC BLOOD PRESSURE: 89 MMHG | SYSTOLIC BLOOD PRESSURE: 142 MMHG | WEIGHT: 120 LBS | BODY MASS INDEX: 19.29 KG/M2 | HEIGHT: 66 IN | OXYGEN SATURATION: 99 % | RESPIRATION RATE: 10 BRPM | TEMPERATURE: 97.8 F

## 2023-02-20 PROCEDURE — 77030013992 HC SNR POLYP ENDOSC BSC -B: Performed by: INTERNAL MEDICINE

## 2023-02-20 PROCEDURE — 2709999900 HC NON-CHARGEABLE SUPPLY: Performed by: INTERNAL MEDICINE

## 2023-02-20 PROCEDURE — 76060000031 HC ANESTHESIA FIRST 0.5 HR: Performed by: INTERNAL MEDICINE

## 2023-02-20 PROCEDURE — 88305 TISSUE EXAM BY PATHOLOGIST: CPT

## 2023-02-20 PROCEDURE — 76040000019: Performed by: INTERNAL MEDICINE

## 2023-02-20 PROCEDURE — 74011250636 HC RX REV CODE- 250/636: Performed by: INTERNAL MEDICINE

## 2023-02-20 PROCEDURE — 74011250636 HC RX REV CODE- 250/636: Performed by: ANESTHESIOLOGY

## 2023-02-20 PROCEDURE — 77030010936 HC CLP LIG BSC -C: Performed by: INTERNAL MEDICINE

## 2023-02-20 PROCEDURE — 74011000250 HC RX REV CODE- 250: Performed by: ANESTHESIOLOGY

## 2023-02-20 DEVICE — CLIP
Type: IMPLANTABLE DEVICE | Site: RECTUM | Status: FUNCTIONAL
Brand: RESOLUTION 360™ ULTRA CLIP

## 2023-02-20 RX ORDER — NALOXONE HYDROCHLORIDE 0.4 MG/ML
0.4 INJECTION, SOLUTION INTRAMUSCULAR; INTRAVENOUS; SUBCUTANEOUS
Status: DISCONTINUED | OUTPATIENT
Start: 2023-02-20 | End: 2023-02-20 | Stop reason: HOSPADM

## 2023-02-20 RX ORDER — EPINEPHRINE 0.1 MG/ML
1 INJECTION INTRACARDIAC; INTRAVENOUS
Status: DISCONTINUED | OUTPATIENT
Start: 2023-02-20 | End: 2023-02-20 | Stop reason: HOSPADM

## 2023-02-20 RX ORDER — SODIUM CHLORIDE 0.9 % (FLUSH) 0.9 %
5-40 SYRINGE (ML) INJECTION EVERY 8 HOURS
Status: DISCONTINUED | OUTPATIENT
Start: 2023-02-20 | End: 2023-02-20 | Stop reason: HOSPADM

## 2023-02-20 RX ORDER — SODIUM CHLORIDE 0.9 % (FLUSH) 0.9 %
5-40 SYRINGE (ML) INJECTION AS NEEDED
Status: DISCONTINUED | OUTPATIENT
Start: 2023-02-20 | End: 2023-02-20 | Stop reason: HOSPADM

## 2023-02-20 RX ORDER — LIDOCAINE HYDROCHLORIDE 20 MG/ML
INJECTION, SOLUTION EPIDURAL; INFILTRATION; INTRACAUDAL; PERINEURAL AS NEEDED
Status: DISCONTINUED | OUTPATIENT
Start: 2023-02-20 | End: 2023-02-20 | Stop reason: HOSPADM

## 2023-02-20 RX ORDER — ATROPINE SULFATE 0.1 MG/ML
0.5 INJECTION INTRAVENOUS
Status: DISCONTINUED | OUTPATIENT
Start: 2023-02-20 | End: 2023-02-20 | Stop reason: HOSPADM

## 2023-02-20 RX ORDER — SODIUM CHLORIDE 9 MG/ML
75 INJECTION, SOLUTION INTRAVENOUS CONTINUOUS
Status: DISCONTINUED | OUTPATIENT
Start: 2023-02-20 | End: 2023-02-20 | Stop reason: HOSPADM

## 2023-02-20 RX ORDER — FLUMAZENIL 0.1 MG/ML
0.2 INJECTION INTRAVENOUS
Status: DISCONTINUED | OUTPATIENT
Start: 2023-02-20 | End: 2023-02-20 | Stop reason: HOSPADM

## 2023-02-20 RX ORDER — PROPOFOL 10 MG/ML
INJECTION, EMULSION INTRAVENOUS AS NEEDED
Status: DISCONTINUED | OUTPATIENT
Start: 2023-02-20 | End: 2023-02-20 | Stop reason: HOSPADM

## 2023-02-20 RX ORDER — DEXTROMETHORPHAN/PSEUDOEPHED 2.5-7.5/.8
1.2 DROPS ORAL
Status: DISCONTINUED | OUTPATIENT
Start: 2023-02-20 | End: 2023-02-20 | Stop reason: HOSPADM

## 2023-02-20 RX ADMIN — PROPOFOL 150 MG: 10 INJECTION, EMULSION INTRAVENOUS at 10:02

## 2023-02-20 RX ADMIN — SODIUM CHLORIDE 75 ML/HR: 0.9 INJECTION, SOLUTION INTRAVENOUS at 09:45

## 2023-02-20 RX ADMIN — LIDOCAINE HYDROCHLORIDE 20 MG: 20 INJECTION, SOLUTION EPIDURAL; INFILTRATION; INTRACAUDAL; PERINEURAL at 09:53

## 2023-02-20 NOTE — DISCHARGE INSTRUCTIONS
Prachi Saini  [id number]  1960    COLON DISCHARGE INSTRUCTIONS  Discomfort:  Redness at IV site- apply warm compress to area; if redness or soreness persist- contact your physician  There may be a slight amount of blood passed from the rectum  Gaseous discomfort- walking, belching will help relieve any discomfort  You may not operate a vehicle for 12 hours  You may not engage in an occupation involving machinery or appliances for rest of today  You may not drink alcoholic beverages for at least 12 hours  Avoid making any critical decisions for at least 24 hour  DIET:   Regular diet. - however -  remember your colon is empty and a heavy meal will produce gas. Avoid these foods:  vegetables, fried / greasy foods, carbonated drinks for today  MEDICATION:  Per Medication Reconciliation       ACTIVITY:  You may not resume your normal daily activities until tomorrow AM; it is recommended that you spend the remainder of the day resting -  avoid any strenuous activity. CALL M.D. ANY SIGN OF:   Increasing pain, nausea, vomiting  Abdominal distension (swelling)  New increased bleeding (oral or rectal)  Fever (chills)  Pain in chest area  Bloody discharge from nose or mouth  Shortness of breath    You may not  take any Advil, Aspirin, Ibuprofen, Motrin, Aleve, or Goodys for 10 days, ONLY  Tylenol as needed for pain. IMPRESSION:  Impression:    A 3 cm pedunculated polyp was seen 10 cm proximal from anal verge. Large prior tattoo was seen both proximally and distally. An Olympus distal attachment cap was on the tip of the colonoscope. An bloc endoscopic mucosal resection was performed. There was no residual adenomatous tissue seen on close inspection. +Prophylactic 'Ultra'-clip placed x 1 with closure of the defect    Recommendations:    Follow up pathology  Repeat colonoscopy in 6-12 months depending on pathology    Follow-up Instructions:   Call Dr. Eden Garcia for the results of procedure / biopsy in 7-10 days  Telephone # [phone]      Reggie Kessler MD    Patient Education on Sedation / Analgesia Administered for Procedure      For 24 hours after general anesthesia or intravenous analgesia / sedation:  Have someone responsible help you with your care  Limit your activities  Do not drive and operate hazardous machinery  Do not make important personal, legal or business decisions  Do not drink alcoholic beverages  If you have not urinated within 8 hours after discharge, please contact your physician  Resume your medications unless otherwise instructed    For 24 hours after general anesthesia or intravenous analgesia / sedation  you may experience:  Drowsiness, dizziness, sleepiness, or confusion  Difficulty remembering or delayed reaction times  Difficulty with your balance, especially while walking, move slowly and carefully, do not make sudden position changes  Difficulty focusing or blurred vision    You may not be aware of slight changes in your behavior and/or your reaction time because of the medication used during and after your procedure. Report the following to your physician:  Excessive pain, swelling, redness or odor of or around the surgical area  Temperature over 100.5  Nausea and vomiting lasting longer than 4 hours or if unable to take medications  Any signs of decreased circulation or nerve impairment to extremity: change in color, persistent numbness, tingling, coldness or increase pain  Any questions or concerns    IF YOU REPORT TO AN EMERGENCY ROOM, DOCTOR'S OFFICE OR HOSPITAL WITHIN 24 HOURS AFTER YOUR PROCEDURE, BRING THIS SHEET AND YOUR AFTER VISIT SUMMARY WITH YOU AND GIVE IT TO THE PHYSICIAN OR NURSE ATTENDING YOU. Colon Polyps: Care Instructions  Your Care Instructions     Colon polyps are growths in the colon or the rectum. The cause of most colon polyps is not known, and most people who get them do not have any problems. But a certain kind can turn into cancer.  For this reason, regular testing for colon polyps is important for people as they get older. It is also important for anyone who has an increased risk for colon cancer. Polyps are usually found through routine colon cancer screening tests. Although most colon polyps are not cancerous, they are usually removed and then tested for cancer. Screening for colon cancer saves lives because the cancer can usually be cured if it is caught early. If you have a polyp that is the type that can turn into cancer, you may need more tests to examine your entire colon. The doctor will remove any other polyps that are found, and you will be tested more often. Follow-up care is a key part of your treatment and safety. Be sure to make and go to all appointments, and call your doctor if you are having problems. It's also a good idea to know your test results and keep a list of the medicines you take. How can you care for yourself at home? Regular exams to look for colon polyps are the best way to prevent polyps from turning into colon cancer. These can include stool tests, sigmoidoscopy, colonoscopy, and CT colonography. Talk with your doctor about a testing schedule that is right for you. To prevent polyps  There is no home treatment that can prevent colon polyps. But these steps may help lower your risk for cancer. Stay active. Being active can help you get to and stay at a healthy weight. Try to exercise on most days of the week. Walking is a good choice. Eat well. Choose a variety of vegetables, fruits, legumes (such as peas and beans), fish, poultry, and whole grains. Do not smoke. If you need help quitting, talk to your doctor about stop-smoking programs and medicines. These can increase your chances of quitting for good. If you drink alcohol, limit how much you drink. Limit alcohol to 2 drinks a day for men and 1 drink a day for women. When should you call for help?    Call your doctor now or seek immediate medical care if: You have severe belly pain. Your stools are maroon or very bloody. Watch closely for changes in your health, and be sure to contact your doctor if:    You have a fever. You have nausea or vomiting. You have a change in bowel habits (new constipation or diarrhea). Your symptoms get worse or are not improving as expected. Where can you learn more? Go to http://www.lopez.com/  Enter C571 in the search box to learn more about \"Colon Polyps: Care Instructions. \"  Current as of: June 6, 2022               Content Version: 13.4  © 3471-8185 [employer]. Care instructions adapted under license by [employer] (which disclaims liability or warranty for this information). If you have questions about a medical condition or this instruction, always ask your healthcare professional. [address] any warranty or liability for your use of this information.

## 2023-02-20 NOTE — ROUTINE PROCESS
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 Marbella Fuller  Assistant(s): Endoscopy Technician-1: Sean Campos  Endoscopy RN-1: Armida Morales RN    Specimens:   ID Type Source Tests Collected by Time Destination   1 : Proximal Rectum Polyp Preservative OTHER (use comments)  Rene Antonio MD 2/20/2023 1004 Pathology     H. Pylori  no    Assessment:  Intra-procedure medications     Anesthesia gave intra-procedure sedation and medications, see anesthesia flow sheet yes    Intravenous fluids: NS@ KVO     Vital signs stable     Abdominal assessment: round and soft     Recommendation:  Discharge patient per MD order.   Family   Permission to share finding with family or friend yes

## 2023-02-20 NOTE — ANESTHESIA POSTPROCEDURE EVALUATION
Procedure(s):  SIGMOIDOSCOPY FLEXIBLE  ENDOSCOPIC POLYPECTOMY  RESOLUTION CLIP x1  ENDOSCOPIC MUCOSAL RESECTION. total IV anesthesia    Anesthesia Post Evaluation        Patient location during evaluation: PACU  Note status: Adequate. Level of consciousness: responsive to verbal stimuli and sleepy but conscious  Pain management: satisfactory to patient  Airway patency: patent  Anesthetic complications: no  Cardiovascular status: acceptable  Respiratory status: acceptable  Hydration status: acceptable  Comments: +Post-Anesthesia Evaluation and Assessment    Patient: Amy Altamirano MRN: [id number]  SSN: [id number]   YOB: 1960  Age: 57 y.o. Sex: female      Cardiovascular Function/Vital Signs    BP (!) 142/89   Pulse 73   Temp 36.6 °C (97.8 °F)   Resp 10   Ht 5' 6\" (1.676 m)   Wt 54.4 kg (120 lb)   SpO2 99%   Breastfeeding No   BMI 19.37 kg/m²     Patient is status post Procedure(s):  SIGMOIDOSCOPY FLEXIBLE  ENDOSCOPIC POLYPECTOMY  RESOLUTION CLIP x1  ENDOSCOPIC MUCOSAL RESECTION. Nausea/Vomiting: Controlled. Postoperative hydration reviewed and adequate. Pain:  Pain Scale 1: Numeric (0 - 10) (02/20/23 1033)  Pain Intensity 1: 0 (02/20/23 1033)   Managed. Neurological Status: At baseline. Mental Status and Level of Consciousness: Arousable. Pulmonary Status:   O2 Device: None (Room air) (02/20/23 1033)   Adequate oxygenation and airway patent. Complications related to anesthesia: None    Post-anesthesia assessment completed. No concerns. Signed By: Maximilian Arias DO    2/20/2023  Post anesthesia nausea and vomiting:  controlled      INITIAL Post-op Vital signs:   Vitals Value Taken Time   /89 02/20/23 1033   Temp 36.6 °C (97.8 °F) 02/20/23 1013   Pulse 76 02/20/23 1035   Resp 18 02/20/23 1035   SpO2 99 % 02/20/23 1035   Vitals shown include unvalidated device data.

## 2023-02-20 NOTE — PROCEDURES
NAME:  Ilya Arndt   :   1960   MRN:   [id number]     Date/Time:  2023 10:14 AM    Flexible Sigmoidoscopy Operative Report    Procedure Type:   Flexible sigmoidoscopy with Endoscopic mucosal resection     Indications: For therapy of known tubulovillous adenoma  Pre-operative Diagnosis: see indication above  Post-operative Diagnosis:  See findings below  :  Ash Mark MD  Referring Provider: --River Walters, NP    Exam:  Airway: clear, no airway problems anticipated  Heart: RRR, without gallops or rubs  Lungs: clear bilaterally without wheezes, crackles, or rhonchi  Abdomen: soft, nontender, nondistended, bowel sounds present  Mental Status: awake, alert and oriented to person, place and time    Sedation:  MAC anesthesia Propofol  Procedure Details:  After informed consent was obtained with all risks and benefits of procedure explained and preoperative exam completed, the patient was taken to the endoscopy suite and placed in the left lateral decubitus position. Upon sequential sedation as per above, a digital rectal exam was performed demonstrating internal hemorrhoids. The Olympus videocolonoscope  was inserted in the rectum and carefully advanced to the sigmoid colon. The quality of preparation was good. The colonoscope was slowly withdrawn with careful evaluation between folds. Findings:   A 3 cm pedunculated polyp was seen 10 cm proximal from anal verge. Large prior tattoo was seen both proximally and distally. An Olympus distal attachment cap was on the tip of the colonoscope. An bloc endoscopic mucosal resection was performed. There was no residual adenomatous tissue seen on close inspection. +Prophylactic 'Ultra'-clip placed x 1 with closure of the defect    Specimen Removed:  Proximal rectal polyp  Complications: None. EBL:  None. Impression:    A 3 cm pedunculated polyp was seen 10 cm proximal from anal verge.  Large prior tattoo was seen both proximally and distally. An Olympus distal attachment cap was on the tip of the colonoscope. An bloc endoscopic mucosal resection was performed. There was no residual adenomatous tissue seen on close inspection. +Prophylactic 'Ultra'-clip placed x 1 with closure of the defect    Recommendations: Follow up pathology  Repeat colonoscopy in 6-12 months depending on pathology    Discharge Disposition:  Home in the company of a  when able to ambulate.       Bryan Becker MD

## 2023-02-20 NOTE — PROGRESS NOTES
Endoscopy Case End Note:    1003:  Procedure scope was pre-cleaned, per protocol, at bedside by Alvin PATRICK      1007:  Report received from anesthesia - Dr. Pancho Hugo. See anesthesia flowsheet for intra-procedure vital signs and events.

## 2023-02-20 NOTE — ANESTHESIA PREPROCEDURE EVALUATION
Anesthetic History   No history of anesthetic complications     Pertinent negatives: No PONV       Review of Systems / Medical History  Patient summary reviewed, nursing notes reviewed and pertinent labs reviewed    Pulmonary  Within defined limits                 Neuro/Psych   Within defined limits           Cardiovascular    Hypertension        Dysrhythmias   Hyperlipidemia    Exercise tolerance: >4 METS  Comments: PAC'S - [place] TEA CAUSES   GI/Hepatic/Renal     GERD           Endo/Other        Arthritis     Other Findings   Comments: Pelvic mass           Physical Exam    Airway  Mallampati: III  TM Distance: 4 - 6 cm  Neck ROM: normal range of motion   Mouth opening: Normal     Cardiovascular  Regular rate and rhythm,  S1 and S2 normal,  no murmur, click, rub, or gallop  Rhythm: regular  Rate: normal         Dental  No notable dental hx       Pulmonary  Breath sounds clear to auscultation               Abdominal  GI exam deferred       Other Findings            Anesthetic Plan    ASA: 2  Anesthesia type: general and total IV anesthesia          Induction: Intravenous  Anesthetic plan and risks discussed with: Patient      Propofol MAC

## 2023-02-20 NOTE — H&P
Gastroenterology Outpatient History and Physical    Patient: [facility]    Physician: Babs Lilly MD    Chief Complaint: Large TVA  History of Present Illness: No GI complaints    History:  Past Medical History:   Diagnosis Date    Abnormal Pap smear     h/o    Allergic rhinitis due to other allergen     Arrhythmia     PAC'S - [place] TEA CAUSES    Arthritis     spine, hips    Esophageal reflux     Herpes zoster     Hypertension     Mixed hyperlipidemia     Nausea & vomiting     Osteopenia     Other atopic dermatitis and related conditions     Shingles rash 10/2015    small spot on left shoulder    Unspecified spontaneous  without mention of complication     X2      Past Surgical History:   Procedure Laterality Date    COLPOSCOPY  29 y/o    with cryo    DILATION AND CURETTAGE      HX COLONOSCOPY  2001    HX CAYLA AND BSO  2018    HX WRIST FRACTURE TX Right 1990    WRIST REPAIR      Social History     Socioeconomic History    Marital status:      Spouse name: Nasima Caballero    Number of children: 2   Occupational History    Occupation: Nurse     Employer: [id number]   Tobacco Use    Smoking status: Never    Smokeless tobacco: Never   Vaping Use    Vaping Use: Never used   Substance and Sexual Activity    Alcohol use: No     Alcohol/week: 0.8 standard drinks     Types: 1 Glasses of wine per week    Drug use: No     Comment: CBD gummies    Sexual activity: Yes     Partners: Male     Social Determinants of Health     Financial Resource Strain: Low Risk     Difficulty of Paying Living Expenses: Not hard at all   Food Insecurity: No Food Insecurity    Worried About Running Out of Food in the Last Year: Never true    Ran Out of Food in the Last Year: Never true      Family History   Problem Relation Age of Onset    Hypertension Mother     Heart Disease Mother         CHF    Cancer Mother         kidney    Heart Attack Father     Coronary Art Dis Father         with pacemaker    Asthma Father     Heart Disease Father         pacemaker    Stroke Maternal Grandmother     Heart Attack Paternal Grandfather     Anesth Problems Neg Hx       Patient Active Problem List   Diagnosis Code    GERD (gastroesophageal reflux disease) K21.9    AR (allergic rhinitis) J30.9    Osteoporosis, post-menopausal M81.0    Hyperlipidemia E78.5    Essential hypertension I10    Pelvic mass R19.00       Allergies: No Known Allergies  Medications:   Prior to Admission medications    Medication Sig Start Date End Date Taking? Authorizing Provider   cranberry fruit extract (CRANBERRY EXTRACT PO) Take  by mouth daily as needed. Yes Provider, Historical   Plenvu 140-9-5.2 gram ppks TAKE AS DIRECTED BEFORE COLONOSCOPY 11/7/22  Yes Provider, Historical   coenzyme Q-10 (CO Q-10) 30 mg capsule Take 30 mg by mouth daily. Yes Provider, Historical   losartan (COZAAR) 25 mg tablet TAKE 1 TABLET BY MOUTH EVERY DAY 12/24/22  Yes Laura Hansen, NP   cyanocobalamin (VITAMIN B12) 100 mcg tablet 0 Refills 6/21/21  Yes Provider, Historical   fexofenadine HCl (ALLEGRA PO) Take  by mouth daily. Yes Provider, Historical   LYSINE PO Take  by mouth daily. Yes Provider, Historical   CALCIUM CARBONATE/VITAMIN D3 (CALCIUM + D PO) Take 1 Tablet by mouth daily. Total 770 mg daily calcium and 1000 units vitamin d   Yes Provider, Historical   MULTIVITAMIN PO Take 1 Tab by mouth daily. Yes Provider, Historical   cholecalciferol, vitamin D3, 2,000 unit tab Take 2,000 Units by mouth daily. Provider, Historical     Physical Exam:   Vital Signs: Blood pressure (!) 158/98, pulse 76, temperature 97.8 °F (36.6 °C), resp. rate 13, height 5' 6\" (1.676 m), weight 54.4 kg (120 lb), SpO2 99 %, not currently breastfeeding.   General: well developed, well nourished   HEENT: unremarkable   Heart: regular rhythm no mumur    Lungs: clear   Abdominal:  benign   Neurological: unremarkable   Extremities: no edema     Findings/Diagnosis: Large TVA in proximal rectum  Plan of Care/Planned Procedure: Flexible sigmoidoscopy EMR with conscious/deep sedation    Signed:  Viviana Whitlock MD 2/20/2023

## 2023-02-24 ENCOUNTER — HOSPITAL ENCOUNTER (OUTPATIENT)
Age: 63
Setting detail: OBSERVATION
Discharge: HOME | End: 2023-02-25
Attending: EMERGENCY MEDICINE | Admitting: INTERNAL MEDICINE
Payer: COMMERCIAL

## 2023-02-24 ENCOUNTER — ANESTHESIA (OUTPATIENT)
Dept: ENDOSCOPY | Age: 63
End: 2023-02-24
Payer: COMMERCIAL

## 2023-02-24 ENCOUNTER — ANESTHESIA EVENT (OUTPATIENT)
Dept: ENDOSCOPY | Age: 63
End: 2023-02-24
Payer: COMMERCIAL

## 2023-02-24 DIAGNOSIS — K92.2 LOWER GI BLEED: Primary | ICD-10-CM

## 2023-02-24 PROBLEM — K62.5 RECTAL BLEED: Status: ACTIVE | Noted: 2023-02-24

## 2023-02-24 LAB
BASOPHILS # BLD: 0 K/UL (ref 0–0.1)
BASOPHILS NFR BLD: 1 % (ref 0–1)
DIFFERENTIAL METHOD BLD: NORMAL
EOSINOPHIL # BLD: 0 K/UL (ref 0–0.4)
EOSINOPHIL NFR BLD: 1 % (ref 0–7)
ERYTHROCYTE [DISTWIDTH] IN BLOOD BY AUTOMATED COUNT: 12.4 % (ref 11.5–14.5)
HCT VFR BLD AUTO: 38 % (ref 35–47)
HGB BLD-MCNC: 12.6 G/DL (ref 11.5–16)
IMM GRANULOCYTES # BLD AUTO: 0 K/UL (ref 0–0.04)
IMM GRANULOCYTES NFR BLD AUTO: 0 % (ref 0–0.5)
LYMPHOCYTES # BLD: 1.4 K/UL (ref 0.8–3.5)
LYMPHOCYTES NFR BLD: 28 % (ref 12–49)
MCH RBC QN AUTO: 29.9 PG (ref 26–34)
MCHC RBC AUTO-ENTMCNC: 33.2 G/DL (ref 30–36.5)
MCV RBC AUTO: 90 FL (ref 80–99)
MONOCYTES # BLD: 0.3 K/UL (ref 0–1)
MONOCYTES NFR BLD: 6 % (ref 5–13)
NEUTS SEG # BLD: 3.2 K/UL (ref 1.8–8)
NEUTS SEG NFR BLD: 64 % (ref 32–75)
NRBC # BLD: 0 K/UL (ref 0–0.01)
NRBC BLD-RTO: 0 PER 100 WBC
PLATELET # BLD AUTO: 176 K/UL (ref 150–400)
PMV BLD AUTO: 9.2 FL (ref 8.9–12.9)
RBC # BLD AUTO: 4.22 M/UL (ref 3.8–5.2)
WBC # BLD AUTO: 5 K/UL (ref 3.6–11)

## 2023-02-24 PROCEDURE — 99285 EMERGENCY DEPT VISIT HI MDM: CPT

## 2023-02-24 PROCEDURE — 74011000250 HC RX REV CODE- 250: Performed by: ANESTHESIOLOGY

## 2023-02-24 PROCEDURE — 76060000031 HC ANESTHESIA FIRST 0.5 HR: Performed by: INTERNAL MEDICINE

## 2023-02-24 PROCEDURE — 74011250636 HC RX REV CODE- 250/636: Performed by: ANESTHESIOLOGY

## 2023-02-24 PROCEDURE — 77030003657 HC NDL SCLER BSC -B: Performed by: INTERNAL MEDICINE

## 2023-02-24 PROCEDURE — 85025 COMPLETE CBC W/AUTO DIFF WBC: CPT

## 2023-02-24 PROCEDURE — 76040000019: Performed by: INTERNAL MEDICINE

## 2023-02-24 PROCEDURE — 74011250636 HC RX REV CODE- 250/636: Performed by: INTERNAL MEDICINE

## 2023-02-24 PROCEDURE — 77030010936 HC CLP LIG BSC -C: Performed by: INTERNAL MEDICINE

## 2023-02-24 PROCEDURE — 36415 COLL VENOUS BLD VENIPUNCTURE: CPT

## 2023-02-24 PROCEDURE — 74011000250 HC RX REV CODE- 250: Performed by: INTERNAL MEDICINE

## 2023-02-24 PROCEDURE — G0378 HOSPITAL OBSERVATION PER HR: HCPCS

## 2023-02-24 PROCEDURE — 2709999900 HC NON-CHARGEABLE SUPPLY: Performed by: INTERNAL MEDICINE

## 2023-02-24 PROCEDURE — 77030038665 HC SOL ELEVIEW INJ AGNT ARPH -B: Performed by: INTERNAL MEDICINE

## 2023-02-24 DEVICE — WORKING LENGTH 235CM, WORKING CHANNEL 2.8MM
Type: IMPLANTABLE DEVICE | Site: RECTUM | Status: FUNCTIONAL
Brand: RESOLUTION 360 CLIP

## 2023-02-24 DEVICE — CLIP
Type: IMPLANTABLE DEVICE | Site: RECTUM | Status: FUNCTIONAL
Brand: RESOLUTION 360™ ULTRA CLIP

## 2023-02-24 RX ORDER — ACETAMINOPHEN 650 MG/1
650 SUPPOSITORY RECTAL
Status: DISCONTINUED | OUTPATIENT
Start: 2023-02-24 | End: 2023-02-25 | Stop reason: HOSPADM

## 2023-02-24 RX ORDER — THERA TABS 400 MCG
1 TAB ORAL DAILY
Status: DISCONTINUED | OUTPATIENT
Start: 2023-02-25 | End: 2023-02-25 | Stop reason: HOSPADM

## 2023-02-24 RX ORDER — FLUMAZENIL 0.1 MG/ML
0.2 INJECTION INTRAVENOUS
Status: DISCONTINUED | OUTPATIENT
Start: 2023-02-24 | End: 2023-02-24 | Stop reason: HOSPADM

## 2023-02-24 RX ORDER — SODIUM CHLORIDE 0.9 % (FLUSH) 0.9 %
5-40 SYRINGE (ML) INJECTION AS NEEDED
Status: DISCONTINUED | OUTPATIENT
Start: 2023-02-24 | End: 2023-02-25 | Stop reason: HOSPADM

## 2023-02-24 RX ORDER — POLYETHYLENE GLYCOL 3350 17 G/17G
17 POWDER, FOR SOLUTION ORAL DAILY PRN
Status: DISCONTINUED | OUTPATIENT
Start: 2023-02-24 | End: 2023-02-25 | Stop reason: HOSPADM

## 2023-02-24 RX ORDER — ONDANSETRON 4 MG/1
4 TABLET, ORALLY DISINTEGRATING ORAL
Status: DISCONTINUED | OUTPATIENT
Start: 2023-02-24 | End: 2023-02-25 | Stop reason: HOSPADM

## 2023-02-24 RX ORDER — DEXTROMETHORPHAN/PSEUDOEPHED 2.5-7.5/.8
1.2 DROPS ORAL
Status: DISCONTINUED | OUTPATIENT
Start: 2023-02-24 | End: 2023-02-24 | Stop reason: HOSPADM

## 2023-02-24 RX ORDER — SODIUM CHLORIDE 0.9 % (FLUSH) 0.9 %
5-40 SYRINGE (ML) INJECTION EVERY 8 HOURS
Status: DISCONTINUED | OUTPATIENT
Start: 2023-02-24 | End: 2023-02-25 | Stop reason: HOSPADM

## 2023-02-24 RX ORDER — LOSARTAN POTASSIUM 25 MG/1
25 TABLET ORAL DAILY
Status: DISCONTINUED | OUTPATIENT
Start: 2023-02-25 | End: 2023-02-25 | Stop reason: HOSPADM

## 2023-02-24 RX ORDER — ONDANSETRON 2 MG/ML
4 INJECTION INTRAMUSCULAR; INTRAVENOUS
Status: DISCONTINUED | OUTPATIENT
Start: 2023-02-24 | End: 2023-02-25 | Stop reason: HOSPADM

## 2023-02-24 RX ORDER — ASPIRIN 325 MG
50 TABLET, DELAYED RELEASE (ENTERIC COATED) ORAL DAILY
Status: DISCONTINUED | OUTPATIENT
Start: 2023-02-25 | End: 2023-02-25 | Stop reason: HOSPADM

## 2023-02-24 RX ORDER — MELATONIN
2000 DAILY
Status: DISCONTINUED | OUTPATIENT
Start: 2023-02-25 | End: 2023-02-25 | Stop reason: HOSPADM

## 2023-02-24 RX ORDER — NALOXONE HYDROCHLORIDE 0.4 MG/ML
0.4 INJECTION, SOLUTION INTRAMUSCULAR; INTRAVENOUS; SUBCUTANEOUS
Status: DISCONTINUED | OUTPATIENT
Start: 2023-02-24 | End: 2023-02-24 | Stop reason: HOSPADM

## 2023-02-24 RX ORDER — EPINEPHRINE 0.1 MG/ML
INJECTION INTRACARDIAC; INTRAVENOUS
Status: DISCONTINUED
Start: 2023-02-24 | End: 2023-02-24

## 2023-02-24 RX ORDER — PROPOFOL 10 MG/ML
INJECTION, EMULSION INTRAVENOUS AS NEEDED
Status: DISCONTINUED | OUTPATIENT
Start: 2023-02-24 | End: 2023-02-24 | Stop reason: HOSPADM

## 2023-02-24 RX ORDER — CETIRIZINE HYDROCHLORIDE 10 MG/1
10 TABLET ORAL DAILY
Status: DISCONTINUED | OUTPATIENT
Start: 2023-02-25 | End: 2023-02-25 | Stop reason: HOSPADM

## 2023-02-24 RX ORDER — SODIUM CHLORIDE 9 MG/ML
75 INJECTION, SOLUTION INTRAVENOUS CONTINUOUS
Status: DISCONTINUED | OUTPATIENT
Start: 2023-02-24 | End: 2023-02-24 | Stop reason: HOSPADM

## 2023-02-24 RX ORDER — LIDOCAINE HYDROCHLORIDE 20 MG/ML
INJECTION, SOLUTION EPIDURAL; INFILTRATION; INTRACAUDAL; PERINEURAL AS NEEDED
Status: DISCONTINUED | OUTPATIENT
Start: 2023-02-24 | End: 2023-02-24 | Stop reason: HOSPADM

## 2023-02-24 RX ORDER — ACETAMINOPHEN 325 MG/1
650 TABLET ORAL
Status: DISCONTINUED | OUTPATIENT
Start: 2023-02-24 | End: 2023-02-25 | Stop reason: HOSPADM

## 2023-02-24 RX ORDER — ATROPINE SULFATE 0.1 MG/ML
0.5 INJECTION INTRAVENOUS
Status: DISCONTINUED | OUTPATIENT
Start: 2023-02-24 | End: 2023-02-24 | Stop reason: HOSPADM

## 2023-02-24 RX ORDER — EPINEPHRINE 0.1 MG/ML
1 INJECTION INTRACARDIAC; INTRAVENOUS
Status: COMPLETED | OUTPATIENT
Start: 2023-02-24 | End: 2023-02-24

## 2023-02-24 RX ADMIN — EPINEPHRINE 0.2 MG: 0.1 INJECTION INTRACARDIAC; INTRAVENOUS at 15:29

## 2023-02-24 RX ADMIN — SODIUM CHLORIDE, PRESERVATIVE FREE 10 ML: 5 INJECTION INTRAVENOUS at 21:43

## 2023-02-24 RX ADMIN — PROPOFOL 200 MG: 10 INJECTION, EMULSION INTRAVENOUS at 15:42

## 2023-02-24 RX ADMIN — SODIUM CHLORIDE 75 ML/HR: 9 INJECTION, SOLUTION INTRAVENOUS at 15:14

## 2023-02-24 RX ADMIN — SODIUM CHLORIDE, PRESERVATIVE FREE 10 ML: 5 INJECTION INTRAVENOUS at 18:21

## 2023-02-24 RX ADMIN — LIDOCAINE HYDROCHLORIDE 20 MG: 20 INJECTION, SOLUTION EPIDURAL; INFILTRATION; INTRACAUDAL; PERINEURAL at 15:22

## 2023-02-24 NOTE — ANESTHESIA POSTPROCEDURE EVALUATION
Procedure(s):  SIGMOIDOSCOPY FLEXIBLE  INJECTION- EPI  RESOLUTION CLIP x5. total IV anesthesia, MAC    Anesthesia Post Evaluation        Patient location during evaluation: PACU  Note status: Adequate. Level of consciousness: responsive to verbal stimuli and sleepy but conscious  Pain management: satisfactory to patient  Airway patency: patent  Anesthetic complications: no  Cardiovascular status: acceptable  Respiratory status: acceptable  Hydration status: acceptable  Comments: +Post-Anesthesia Evaluation and Assessment    Patient: Prachi Saini MRN: [id number]  SSN: [id number]   YOB: 1960  Age: 57 y.o. Sex: female      Cardiovascular Function/Vital Signs    BP (!) 147/91 (BP 1 Location: Right upper arm)   Pulse 75   Temp 36.9 °C (98.4 °F)   Resp 15   Ht 5' 6\" (1.676 m)   Wt 54.4 kg (120 lb)   SpO2 98%   Breastfeeding No   BMI 19.37 kg/m²     Patient is status post Procedure(s):  SIGMOIDOSCOPY FLEXIBLE  INJECTION- EPI  RESOLUTION CLIP x5. Nausea/Vomiting: Controlled. Postoperative hydration reviewed and adequate. Pain:  Pain Scale 1: Numeric (0 - 10) (02/24/23 1601)  Pain Intensity 1: 0 (02/24/23 1601)   Managed. Neurological Status: At baseline. Mental Status and Level of Consciousness: Arousable. Pulmonary Status:   O2 Device: None (Room air) (02/24/23 1601)   Adequate oxygenation and airway patent. Complications related to anesthesia: None    Post-anesthesia assessment completed. No concerns. Signed By: Cliff Srinivasan DO    2/24/2023  Post anesthesia nausea and vomiting:  controlled      INITIAL Post-op Vital signs:   Vitals Value Taken Time   /86 02/24/23 1603   Temp     Pulse 75 02/24/23 1601   Resp 14 02/24/23 1602   SpO2 98 % 02/24/23 1602   Vitals shown include unvalidated device data.

## 2023-02-24 NOTE — ANESTHESIA PREPROCEDURE EVALUATION
Anesthetic History   No history of anesthetic complications     Pertinent negatives: No PONV       Review of Systems / Medical History  Patient summary reviewed, nursing notes reviewed and pertinent labs reviewed    Pulmonary  Within defined limits                 Neuro/Psych   Within defined limits           Cardiovascular    Hypertension        Dysrhythmias   Hyperlipidemia    Exercise tolerance: >4 METS  Comments: PAC'S - [place] TEA CAUSES   GI/Hepatic/Renal     GERD          Comments: GI Bleed, Hgb 12.6 Endo/Other        Arthritis     Other Findings   Comments: Pelvic mass           Physical Exam    Airway  Mallampati: III  TM Distance: 4 - 6 cm  Neck ROM: normal range of motion   Mouth opening: Normal     Cardiovascular  Regular rate and rhythm,  S1 and S2 normal,  no murmur, click, rub, or gallop  Rhythm: regular  Rate: normal         Dental  No notable dental hx       Pulmonary  Breath sounds clear to auscultation               Abdominal  GI exam deferred       Other Findings            Anesthetic Plan    ASA: 2  Anesthesia type: total IV anesthesia and MAC          Induction: Intravenous  Anesthetic plan and risks discussed with: Patient      Propofol MAC

## 2023-02-24 NOTE — PERIOP NOTES
1605: TRANSFER - OUT REPORT:    Verbal report given to Primary RN(name) on Karole Race  being transferred for routine progression of care       Report consisted of patients Situation, Background, Assessment and   Recommendations(SBAR). Information from the following report(s) SBAR, Kardex, Procedure Summary, Intake/Output, and MAR was reviewed with the receiving nurse. Opportunity for questions and clarification was provided.       Patient transported with:   Registered Nurse  Tech  Patient's clothing (scrub top and scrub pants)  Patient chart

## 2023-02-24 NOTE — ED TRIAGE NOTES
Pt arrives via EMS. Dr. Yakelin Dolan told pt to come to ED due to bleeding after polyp removal on Monday. Pt states she has continued to bleed since procedure, thought it would get better but has not. Denies pain. Plan per Dr. Yakelin Dolan NP is to do another procedure today.

## 2023-02-24 NOTE — H&P
Hospitalist Admission Note    NAME: Josh Rivera   :  1960   MRN:  [id number]     Date/Time:  2023 4:51 PM    Patient PCP: Nadege Saenz, NP  ______________________________________________________________________  Given the patient's current clinical presentation, I have a high level of concern for decompensation if discharged from the emergency department. Complex decision making was performed, which includes reviewing the patient's available past medical records, laboratory results, and x-ray films. My assessment of this patient's clinical condition and my plan of care is as follows. Assessment / Plan:  Rectal bleed due to postpolypectomy bleeding   We will admit under observation, status post sigmoidoscopy with control of bleeding, hemoglobin is 12.6  Allow clear liquid diet, GI on board  Repeat CBC tomorrow    Hypertension  Continue with Cozaar    Code Status: Full code  Surrogate Decision Maker:    DVT Prophylaxis: sCDs  GI Prophylaxis: not indicated    Baseline: Ambulatory      Subjective:   CHIEF COMPLAINT: Rectal bleed    HISTORY OF PRESENT ILLNESS:     Josh Rivera is a 57 y.o.  female with no significant past medical history except for hypertension who presents with rectal bleed since her sigmoidoscopy on . Patient had sigmoidoscopy and polyp removal on 23  Was referred by Dr. Angie Jalloh  for emergent control of the bleeding control of bleeding  Patient seen in endoscopy suite at the end of the procedure  In the ED, her vital signs initially showed that she was hypertensive    We were asked to admit for work up and evaluation of the above problems.      Past Medical History:   Diagnosis Date    Abnormal Pap smear     h/o    Allergic rhinitis due to other allergen     Arrhythmia     PAC'S - [place] TEA CAUSES    Arthritis     spine, hips    Esophageal reflux     Herpes zoster     Hypertension     Mixed hyperlipidemia     Nausea & vomiting Osteopenia     Other atopic dermatitis and related conditions     Shingles rash 10/2015    small spot on left shoulder    Unspecified spontaneous  without mention of complication     X2        Past Surgical History:   Procedure Laterality Date    COLPOSCOPY  31 y/o    with cryo    DILATION AND CURETTAGE      FLEXIBLE SIGMOIDOSCOPY N/A 2023    SIGMOIDOSCOPY FLEXIBLE performed by Adelia Bernardo MD at [facility] ENDOSCOPY    HX COLONOSCOPY  2001    HX CAYLA AND BSO  2018    HX WRIST FRACTURE TX Right 1990    WRIST REPAIR       Social History     Tobacco Use    Smoking status: Never    Smokeless tobacco: Never   Substance Use Topics    Alcohol use: No     Alcohol/week: 0.8 standard drinks     Types: 1 Glasses of wine per week        Family History   Problem Relation Age of Onset    Hypertension Mother     Heart Disease Mother         CHF    Cancer Mother         kidney    Heart Attack Father     Coronary Art Dis Father         with pacemaker    Asthma Father     Heart Disease Father         pacemaker    Stroke Maternal Grandmother     Heart Attack Paternal Grandfather     Anesth Problems Neg Hx      No Known Allergies     Prior to Admission medications    Medication Sig Start Date End Date Taking? Authorizing Provider   cranberry fruit extract (CRANBERRY EXTRACT PO) Take  by mouth daily as needed. Yes Provider, Historical   Plenvu 140-9-5.2 gram ppks TAKE AS DIRECTED BEFORE COLONOSCOPY 22  Yes Provider, Historical   coenzyme Q-10 (CO Q-10) 30 mg capsule Take 30 mg by mouth daily. Yes Provider, Historical   losartan (COZAAR) 25 mg tablet TAKE 1 TABLET BY MOUTH EVERY DAY 22  Yes Laura Hansen, NP   cyanocobalamin (VITAMIN B12) 100 mcg tablet 0 Refills 21  Yes Provider, Historical   fexofenadine HCl (ALLEGRA PO) Take  by mouth daily. Yes Provider, Historical   LYSINE PO Take  by mouth daily.    Yes Provider, Historical   cholecalciferol, vitamin D3, 2,000 unit tab Take 2,000 Units by mouth daily. Yes Provider, Historical   CALCIUM CARBONATE/VITAMIN D3 (CALCIUM + D PO) Take 1 Tablet by mouth daily. Total 770 mg daily calcium and 1000 units vitamin d   Yes Provider, Historical   MULTIVITAMIN PO Take 1 Tab by mouth daily. Yes Provider, Historical       REVIEW OF SYSTEMS:     I am not able to complete the review of systems because:    The patient is intubated and sedated    The patient has altered mental status due to his acute medical problems    The patient has baseline aphasia from prior stroke(s)    The patient has baseline dementia and is not reliable historian    The patient is in acute medical distress and unable to provide information           Total of 12 systems reviewed as follows:       POSITIVE= underlined text  Negative = text not underlined  General:  fever, chills, sweats, generalized weakness, weight loss/gain,      loss of appetite   Eyes:    blurred vision, eye pain, loss of vision, double vision  ENT:    rhinorrhea, pharyngitis   Respiratory:   cough, sputum production, SOB, RODRIGUEZ, wheezing, pleuritic pain   Cardiology:   chest pain, palpitations, orthopnea, PND, edema, syncope   Gastrointestinal:  abdominal pain , N/V, diarrhea, dysphagia, constipation, bleeding   Genitourinary:  frequency, urgency, dysuria, hematuria, incontinence   Muskuloskeletal :  arthralgia, myalgia, back pain  Hematology:  easy bruising, nose or gum bleeding, lymphadenopathy   Dermatological: rash, ulceration, pruritis, color change / jaundice  Endocrine:   hot flashes or polydipsia   Neurological:  headache, dizziness, confusion, focal weakness, paresthesia,     Speech difficulties, memory loss, gait difficulty  Psychological: Feelings of anxiety, depression, agitation    Objective:   VITALS:    Visit Vitals  BP (!) 140/94   Pulse 60   Temp 98.1 °F (36.7 °C)   Resp 18   Ht 5' 6\" (1.676 m)   Wt 54.4 kg (120 lb)   SpO2 99%   Breastfeeding No   BMI 19.37 kg/m²       PHYSICAL EXAM:    General:    Alert, cooperative, no distress, appears stated age. HEENT: Atraumatic, anicteric sclerae, pink conjunctivae     No oral ulcers, mucosa moist, throat clear, dentition fair  Neck:  Supple, symmetrical,  thyroid: non tender  Lungs:   Clear to auscultation bilaterally. No Wheezing or Rhonchi. No rales. Chest wall:  No tenderness  No Accessory muscle use. Heart:   Regular  rhythm,  No  murmur   No edema  Abdomen:   Soft, non-tender. Not distended. Bowel sounds normal  Extremities: No cyanosis. No clubbing,      Skin turgor normal, Capillary refill normal, Radial dial pulse 2+  Skin:     Not pale. Not Jaundiced  No rashes   Psych:  Good insight. Not depressed. Not anxious or agitated. Neurologic: EOMs intact. No facial asymmetry. No aphasia or slurred speech. Symmetrical strength, Sensation grossly intact. Alert and oriented X 4.     _______________________________________________________________________  Care Plan discussed with:    Comments   Patient x    Family      RN x    Care Manager                    Consultant:      _______________________________________________________________________  Expected  Disposition:   Home with Family x   HH/PT/OT/RN    SNF/LTC    BEE    ________________________________________________________________________  TOTAL TIME:  72 Minutes    Critical Care Provided     Minutes non procedure based      Comments     Reviewed previous records   >50% of visit spent in counseling and coordination of care  Discussion with patient and/or family and questions answered       ________________________________________________________________________  Signed: Shanae Wang MD    Procedures: see electronic medical records for all procedures/Xrays and details which were not copied into this note but were reviewed prior to creation of Plan.     LAB DATA REVIEWED:    Recent Results (from the past 24 hour(s))   CBC WITH AUTOMATED DIFF    Collection Time: 02/24/23  2:27 PM   Result Value Ref Range WBC 5.0 3.6 - 11.0 K/uL    RBC 4.22 3.80 - 5.20 M/uL    HGB 12.6 11.5 - 16.0 g/dL    HCT 38.0 35.0 - 47.0 %    MCV 90.0 80.0 - 99.0 FL    MCH 29.9 26.0 - 34.0 PG    MCHC 33.2 30.0 - 36.5 g/dL    RDW 12.4 11.5 - 14.5 %    PLATELET 085 324 - 541 K/uL    MPV 9.2 8.9 - 12.9 FL    NRBC 0.0 0  WBC    ABSOLUTE NRBC 0.00 0.00 - 0.01 K/uL    NEUTROPHILS 64 32 - 75 %    LYMPHOCYTES 28 12 - 49 %    MONOCYTES 6 5 - 13 %    EOSINOPHILS 1 0 - 7 %    BASOPHILS 1 0 - 1 %    IMMATURE GRANULOCYTES 0 0.0 - 0.5 %    ABS. NEUTROPHILS 3.2 1.8 - 8.0 K/UL    ABS. LYMPHOCYTES 1.4 0.8 - 3.5 K/UL    ABS. MONOCYTES 0.3 0.0 - 1.0 K/UL    ABS. EOSINOPHILS 0.0 0.0 - 0.4 K/UL    ABS. BASOPHILS 0.0 0.0 - 0.1 K/UL    ABS. IMM.  GRANS. 0.0 0.00 - 0.04 K/UL    DF AUTOMATED

## 2023-02-24 NOTE — ED PROVIDER NOTES
[facility] ENDOSCOPY  EMERGENCY DEPARTMENT ENCOUNTER       Pt Name: Ashlee Holman  MRN: [id number]  Isaccgftammi 1960  Date of evaluation: 2023  Provider: ISMA Eller   PCP: Max Buckley NP  Note Started: 2:27 PM 23     CHIEF COMPLAINT       Chief Complaint   Patient presents with    Rectal Bleeding        HISTORY OF PRESENT ILLNESS: 1 or more elements      History From: Patient  HPI Limitations : None     Ashlee Holman is a 57 y.o. female who presents BIBA with rectal bleeding in setting of sigmoidoscopy and polyp removal on 23. The pt was referred here by Dr. Yakelin Dolan. She reports \"gushes of blood\" per rectum that has been occurring since her procedure. She is not anticoagulated. Denies significant pain, dizziness, abdominal pain. Nursing Notes were all reviewed and agreed with or any disagreements were addressed in the HPI. REVIEW OF SYSTEMS      Review of Systems   Gastrointestinal:  Negative for abdominal pain. Rectal bleeding   Neurological:  Negative for dizziness. Positives and Pertinent negatives as per HPI.     PAST HISTORY     Past Medical History:  Past Medical History:   Diagnosis Date    Abnormal Pap smear     h/o    Allergic rhinitis due to other allergen     Arrhythmia     PAC'S - [place] TEA CAUSES    Arthritis     spine, hips    Esophageal reflux     Herpes zoster     Hypertension     Mixed hyperlipidemia     Nausea & vomiting     Osteopenia     Other atopic dermatitis and related conditions     Shingles rash 10/2015    small spot on left shoulder    Unspecified spontaneous  without mention of complication     X2       Past Surgical History:  Past Surgical History:   Procedure Laterality Date    COLPOSCOPY  29 y/o    with cryo    DILATION AND CURETTAGE      FLEXIBLE SIGMOIDOSCOPY N/A 2023    SIGMOIDOSCOPY FLEXIBLE performed by Thao Turner MD at [facility] ENDOSCOPY    HX COLONOSCOPY      HX CAYLA AND BSO  2018    [place] [address] Right 1990    WRIST REPAIR       Family History:  Family History   Problem Relation Age of Onset    Hypertension Mother     Heart Disease Mother         CHF    Cancer Mother         kidney    Heart Attack Father     Coronary Art Dis Father         with pacemaker    Asthma Father     Heart Disease Father         pacemaker    Stroke Maternal Grandmother     Heart Attack Paternal Grandfather     Anesth Problems Neg Hx        Social History:  Social History     Tobacco Use    Smoking status: Never    Smokeless tobacco: Never   Vaping Use    Vaping Use: Never used   Substance Use Topics    Alcohol use: No     Alcohol/week: 0.8 standard drinks     Types: 1 Glasses of wine per week    Drug use: No     Comment: CBD gummies       Allergies:  No Known Allergies    CURRENT MEDICATIONS      Current Discharge Medication List        CONTINUE these medications which have NOT CHANGED    Details   cranberry fruit extract (CRANBERRY EXTRACT PO) Take  by mouth daily as needed. Plenvu 140-9-5.2 gram ppks TAKE AS DIRECTED BEFORE COLONOSCOPY      coenzyme Q-10 (CO Q-10) 30 mg capsule Take 30 mg by mouth daily. losartan (COZAAR) 25 mg tablet TAKE 1 TABLET BY MOUTH EVERY DAY  Qty: 90 Tablet, Refills: 0    Associated Diagnoses: Essential hypertension with goal blood pressure less than 140/90      cyanocobalamin (VITAMIN B12) 100 mcg tablet 0 Refills      fexofenadine HCl (ALLEGRA PO) Take  by mouth daily. LYSINE PO Take  by mouth daily. cholecalciferol, vitamin D3, 2,000 unit tab Take 2,000 Units by mouth daily. CALCIUM CARBONATE/VITAMIN D3 (CALCIUM + D PO) Take 1 Tablet by mouth daily. Total 770 mg daily calcium and 1000 units vitamin d      MULTIVITAMIN PO Take 1 Tab by mouth daily.              PHYSICAL EXAM      ED Triage Vitals [02/24/23 1416]   ED Encounter Vitals Group      BP (!) 163/102      Pulse (Heart Rate) 74      Resp Rate 18      Temp 98.4 °F (36.9 °C)      Temp src       O2 Sat (%) 98 %      Weight 120 lb      Height 5' 6\"        Physical Exam  Vitals and nursing note reviewed. Constitutional:       General: She is not in acute distress. Appearance: Normal appearance. She is not toxic-appearing. HENT:      Head: Normocephalic and atraumatic. Nose: Nose normal.      Mouth/Throat:      Mouth: Mucous membranes are moist.   Eyes:      Extraocular Movements: Extraocular movements intact. Conjunctiva/sclera: Conjunctivae normal.   Cardiovascular:      Rate and Rhythm: Normal rate and regular rhythm. Pulmonary:      Effort: Pulmonary effort is normal.      Breath sounds: Normal breath sounds. Abdominal:      Palpations: Abdomen is soft. Tenderness: There is no abdominal tenderness. There is no guarding. Musculoskeletal:      Cervical back: Normal range of motion and neck supple. Skin:     General: Skin is warm and dry. Neurological:      General: No focal deficit present. Mental Status: She is alert and oriented to person, place, and time. Gait: Gait normal.   Psychiatric:         Mood and Affect: Mood normal.         Behavior: Behavior normal.        DIAGNOSTIC RESULTS   LABS:     Recent Results (from the past 12 hour(s))   CBC WITH AUTOMATED DIFF    Collection Time: 02/24/23  2:27 PM   Result Value Ref Range    WBC 5.0 3.6 - 11.0 K/uL    RBC 4.22 3.80 - 5.20 M/uL    HGB 12.6 11.5 - 16.0 g/dL    HCT 38.0 35.0 - 47.0 %    MCV 90.0 80.0 - 99.0 FL    MCH 29.9 26.0 - 34.0 PG    MCHC 33.2 30.0 - 36.5 g/dL    RDW 12.4 11.5 - 14.5 %    PLATELET 393 708 - 644 K/uL    MPV 9.2 8.9 - 12.9 FL    NRBC 0.0 0  WBC    ABSOLUTE NRBC 0.00 0.00 - 0.01 K/uL    NEUTROPHILS 64 32 - 75 %    LYMPHOCYTES 28 12 - 49 %    MONOCYTES 6 5 - 13 %    EOSINOPHILS 1 0 - 7 %    BASOPHILS 1 0 - 1 %    IMMATURE GRANULOCYTES 0 0.0 - 0.5 %    ABS. NEUTROPHILS 3.2 1.8 - 8.0 K/UL    ABS. LYMPHOCYTES 1.4 0.8 - 3.5 K/UL    ABS. MONOCYTES 0.3 0.0 - 1.0 K/UL    ABS. EOSINOPHILS 0.0 0.0 - 0.4 K/UL    ABS. BASOPHILS 0.0 0.0 - 0.1 K/UL    ABS. IMM. GRANS. 0.0 0.00 - 0.04 K/UL    DF AUTOMATED          EKG: When ordered, EKG's are interpreted by the Emergency Department Physician in the absence of a cardiologist.  Please see their note for interpretation of EKG. RADIOLOGY:  Non-plain film images such as CT, Ultrasound and MRI are read by the radiologist. Plain radiographic images are visualized and preliminarily interpreted by the ED Provider with the below findings:          Interpretation per the Radiologist below, if available at the time of this note:     No results found.       PROCEDURES   Unless otherwise noted below, none  Procedures     CRITICAL CARE TIME       EMERGENCY DEPARTMENT COURSE and DIFFERENTIAL DIAGNOSIS/MDM   Vitals:    Vitals:    02/24/23 1416   BP: (!) 163/102   Pulse: 74   Resp: 18   Temp: 98.4 °F (36.9 °C)   SpO2: 98%   Weight: 54.4 kg (120 lb)   Height: 5' 6\" (1.676 m)        Patient was given the following medications:  Medications   sodium phosphate (FLEET'S) enema 1 Enema (has no administration in time range)   sodium chloride (NS) flush 5-40 mL (has no administration in time range)   sodium chloride (NS) flush 5-40 mL (has no administration in time range)   naloxone (NARCAN) injection 0.4 mg (has no administration in time range)   flumazeniL (ROMAZICON) 0.1 mg/mL injection 0.2 mg (has no administration in time range)   simethicone (MYLICON) 14ZX/9.3JY oral drops 80 mg (has no administration in time range)   atropine injection 0.5 mg (has no administration in time range)   EPINEPHrine (ADRENALIN) 0.1 mg/mL syringe 1 mg (has no administration in time range)   0.9% sodium chloride infusion (75 mL/hr IntraVENous New Bag 2/24/23 1514)       CONSULTS: (Who and What was discussed)  IP CONSULT TO GASTROENTEROLOGY    Chronic Conditions: HTN    Social Determinants affecting Dx or Tx: None    Records Reviewed (source and summary of external records): Prior medical records and Previous Laboratory studies    CC/HPI Summary, DDx, ED Course, and Reassessment:     The patient is well-appearing, hemodynamically stable. GI NP has evaluated the patient in the ED. She would like to check an H&H, give 2 Fleet enemas, and plan to take to the endoscopy suite per Dr. Ronnell Shone. ED Course as of 02/24/23 1528   Fri Feb 24, 2023   1523 Perfect served ISAAC [place]. The patient will go to the endoscopy suite for bleeding control and come back to the ER. She will need to be admitted to the hospitalist for observation for at least 24 hours. GI will follow. Spoke with hospitalist who accepts for obs admission. The patient is off the ED floor in the endoscopy suite at this time. . [AS]      ED Course User Index  [AS] ISMA Plummer       Disposition Considerations (Tests not done, Shared Decision Making, Pt Expectation of Test or Tx.):      FINAL IMPRESSION     1. Lower GI bleed          DISPOSITION/PLAN       Admit Note: Pt is being admitted by Dr. Yusef Spence . The results of their tests and reason(s) for their admission have been discussed with pt and/or available family. They convey agreement and understanding for the need to be admitted and for the admission diagnosis. PATIENT REFERRED TO:  Follow-up Information    None           DISCHARGE MEDICATIONS:  Current Discharge Medication List            DISCONTINUED MEDICATIONS:  Current Discharge Medication List          ED Attending Involvement : I have seen and evaluated the patient. My supervision physician was available for consultation. I am the Primary Clinician of Record. [place], [place] (electronically signed)    (Please note that parts of this dictation were completed with voice recognition software. Quite often unanticipated grammatical, syntax, homophones, and other interpretive errors are inadvertently transcribed by the computer software. Please disregards these errors.  Please excuse any errors that have escaped final proofreading.)

## 2023-02-24 NOTE — PROGRESS NOTES
Zyrtec 10 mg daily was therapeutically interchanged for Allegra daily per the P&T Committee approved [facility].     Branden Polk MS, Pharmacist  2/24/2023 4:52 PM

## 2023-02-24 NOTE — CONSULTS
GI CONSULTATION NOTE  Karla Guerra, NP  [phone] NP in-hospital cell phone M-F until 4:30  After 5pm or on weekends, please call  for physician on call    NAME: Josh Rivera   :  1960   MRN:  [id number]   Attending:    Primary GI:  Dr. Angie Jalloh   Date/Time:  2023 2:18 PM  Assessment:   Hematochezia  2023:  Flex sig with Dr. Angie Jalloh showing a 3 cm pedunculated polyp was seen 10 cm proximal from anal verge. Large prior tattoo was seen both proximally and distally. An Olympus distal attachment cap was on the tip of the colonoscope. An bloc endoscopic mucosal resection was performed. There was no residual adenomatous tissue seen on close inspection. +Prophylactic 'Ultra'-clip placed x 1 with closure of the defect  Plan:   Plan for repeat EGD flex sig today with Dr. Angie Jalloh; obtain consent  Details and risks of the procedure to include (but not limited to) anesthesia, bleeding, infection, and perforation were discussed. Patient understands and is in agreement with the plan  CBC pending   NPO  Fleets enema now x 2  Serial H&H; goal for hgb >7.0  Monitor for s/s of bleeding; transfuse as clinically indicated  Continue PPI  Symptomatic care per primary team  Plan discussed with Dr. Elio Rothman:     HISTORY OF PRESENT ILLNESS:     Josh Rivera is an 57 y.o.  female who we are asked to see for complaint of rectal bleeding. Patient underwent a flex sig with Dr. Angie Jalloh on 2023 with polypectomy. Patient states that since the procedure she has had intermittent rectal bleeding. Patient called and spoke to Dr. Angie Jalloh today and stated that she was \"hemorrhaging. \"  Patient was then referred to ER for further evaluation.      Past Medical History:   Diagnosis Date    Abnormal Pap smear     h/o    Allergic rhinitis due to other allergen     Arrhythmia     PAC'S - [place] TEA CAUSES    Arthritis     spine, hips    Esophageal reflux     Herpes zoster     Hypertension Mixed hyperlipidemia     Nausea & vomiting     Osteopenia     Other atopic dermatitis and related conditions     Shingles rash 10/2015    small spot on left shoulder    Unspecified spontaneous  without mention of complication     X2      Past Surgical History:   Procedure Laterality Date    COLPOSCOPY  31 y/o    with cryo    DILATION AND CURETTAGE      FLEXIBLE SIGMOIDOSCOPY N/A 2023    SIGMOIDOSCOPY FLEXIBLE performed by Gustavo Villalobos MD at [facility] ENDOSCOPY    HX COLONOSCOPY  2001    HX CAYLA AND BSO  2018    HX WRIST FRACTURE TX Right 1990    WRIST REPAIR     Social History     Tobacco Use    Smoking status: Never    Smokeless tobacco: Never   Substance Use Topics    Alcohol use: No     Alcohol/week: 0.8 standard drinks     Types: 1 Glasses of wine per week      Family History   Problem Relation Age of Onset    Hypertension Mother     Heart Disease Mother         CHF    Cancer Mother         kidney    Heart Attack Father     Coronary Art Dis Father         with pacemaker    Asthma Father     Heart Disease Father         pacemaker    Stroke Maternal Grandmother     Heart Attack Paternal Grandfather     Anesth Problems Neg Hx       No Known Allergies   Prior to Admission medications    Medication Sig Start Date End Date Taking? Authorizing Provider   cranberry fruit extract (CRANBERRY EXTRACT PO) Take  by mouth daily as needed. Provider, Historical   Plenvu 140-9-5.2 gram ppks TAKE AS DIRECTED BEFORE COLONOSCOPY 22   Provider, Historical   coenzyme Q-10 (CO Q-10) 30 mg capsule Take 30 mg by mouth daily. Provider, Historical   losartan (COZAAR) 25 mg tablet TAKE 1 TABLET BY MOUTH EVERY DAY 22   Gris Hansen, ARNOL   cyanocobalamin (VITAMIN B12) 100 mcg tablet 0 Refills 21   Provider, Historical   fexofenadine HCl (ALLEGRA PO) Take  by mouth daily. Provider, Historical   LYSINE PO Take  by mouth daily.     Provider, Historical   cholecalciferol, vitamin D3, 2,000 unit tab Take 2,000 Units by mouth daily. Provider, Historical   CALCIUM CARBONATE/VITAMIN D3 (CALCIUM + D PO) Take 1 Tablet by mouth daily. Total 770 mg daily calcium and 1000 units vitamin d    Provider, Historical   MULTIVITAMIN PO Take 1 Tab by mouth daily. Provider, Historical       Patient Active Problem List   Diagnosis Code    GERD (gastroesophageal reflux disease) K21.9    AR (allergic rhinitis) J30.9    Osteoporosis, post-menopausal M81.0    Hyperlipidemia E78.5    Essential hypertension I10    Pelvic mass R19.00       REVIEW OF SYSTEMS:    Constitutional: negative fever, negative chills, negative weight loss  Eyes:   negative visual changes  ENT:   negative sore throat, tongue or lip swelling   Respiratory:  negative cough, negative dyspnea  Cards:  negative for chest pain, palpitations, lower extremity edema  GI:   See HPI  :  negative for frequency, dysuria  Integument:  negative for rash and pruritus  Heme:  negative for easy bruising and gum/nose bleeding  Musculoskel: negative for myalgias,  back pain and muscle weakness  Neuro: negative for headaches, dizziness, vertigo  Psych: negative for feelings of anxiety, depression     Objective:   VITALS:    Visit Vitals  BP (!) 163/102 (BP 1 Location: Right upper arm)   Pulse 74   Temp 98.4 °F (36.9 °C)   Resp 18   Ht 5' 6\" (1.676 m)   Wt 54.4 kg (120 lb)   SpO2 98%   BMI 19.37 kg/m²       PHYSICAL EXAM:   General:          Pleasant  female. NAD  Head:               Normocephalic, without obvious abnormality, atraumatic. Eyes:               Conjunctivae clear and pale, anicteric sclerae. Pupils are equal  Nose:               Nares normal. No drainage or sinus tenderness. Throat:             Lips, mucosa, and tongue normal.  No Thrush  Neck:               Supple, symmetrical,  no adenopathy, thyroid: non tender  Back:               Symmetric,  No CVA tenderness. Lungs:             CTA bilaterally. No wheezing/rhonchi/rales.   Chest wall:      No tenderness or deformity. No Accessory muscle use. Heart:              Regular rate and rhythm,  no murmur, rub or gallop. Abdomen:        Soft, non-tender. Not distended. Bowel sounds normal. No masses  Extremities:     Atraumatic, No cyanosis. No edema. No clubbing  Skin:                Texture, turgor normal. No rashes/lesions/jaundice  Psych:             Good insight. Not depressed. Not anxious or agitated. Neurologic:      EOMs intact. No facial asymmetry. No aphasia or slurred speech. A/O X 3. LAB DATA REVIEWED:    No results found for this or any previous visit (from the past 24 hour(s)).     IMAGING RESULTS:  I have personally reviewed the imaging reports      Total time spent with patient: 25 minutes ________________________________________________________________________  Care Plan discussed with:  Patient x   Family     RN x              Consultant:       CT  2/24/2023:  ________________________________________________________________________    ___________________________________________________  Consulting Provider: Oxana Navarrete NP      2/24/2023  2:18 PM

## 2023-02-25 VITALS
RESPIRATION RATE: 18 BRPM | HEART RATE: 66 BPM | HEIGHT: 66 IN | DIASTOLIC BLOOD PRESSURE: 78 MMHG | WEIGHT: 121.25 LBS | OXYGEN SATURATION: 98 % | TEMPERATURE: 97.3 F | BODY MASS INDEX: 19.49 KG/M2 | SYSTOLIC BLOOD PRESSURE: 114 MMHG

## 2023-02-25 LAB
ANION GAP SERPL CALC-SCNC: 3 MMOL/L (ref 5–15)
BUN SERPL-MCNC: 9 MG/DL (ref 6–20)
BUN/CREAT SERPL: 15 (ref 12–20)
CALCIUM SERPL-MCNC: 8.7 MG/DL (ref 8.5–10.1)
CHLORIDE SERPL-SCNC: 107 MMOL/L (ref 97–108)
CO2 SERPL-SCNC: 28 MMOL/L (ref 21–32)
CREAT SERPL-MCNC: 0.62 MG/DL (ref 0.55–1.02)
ERYTHROCYTE [DISTWIDTH] IN BLOOD BY AUTOMATED COUNT: 12.4 % (ref 11.5–14.5)
GLUCOSE SERPL-MCNC: 109 MG/DL (ref 65–100)
HCT VFR BLD AUTO: 37.1 % (ref 35–47)
HGB BLD-MCNC: 12.2 G/DL (ref 11.5–16)
MCH RBC QN AUTO: 29.3 PG (ref 26–34)
MCHC RBC AUTO-ENTMCNC: 32.9 G/DL (ref 30–36.5)
MCV RBC AUTO: 89 FL (ref 80–99)
NRBC # BLD: 0 K/UL (ref 0–0.01)
NRBC BLD-RTO: 0 PER 100 WBC
PLATELET # BLD AUTO: 172 K/UL (ref 150–400)
PMV BLD AUTO: 8.8 FL (ref 8.9–12.9)
POTASSIUM SERPL-SCNC: 3.3 MMOL/L (ref 3.5–5.1)
RBC # BLD AUTO: 4.17 M/UL (ref 3.8–5.2)
SODIUM SERPL-SCNC: 138 MMOL/L (ref 136–145)
WBC # BLD AUTO: 5.1 K/UL (ref 3.6–11)

## 2023-02-25 PROCEDURE — 74011000250 HC RX REV CODE- 250: Performed by: INTERNAL MEDICINE

## 2023-02-25 PROCEDURE — 36415 COLL VENOUS BLD VENIPUNCTURE: CPT

## 2023-02-25 PROCEDURE — G0378 HOSPITAL OBSERVATION PER HR: HCPCS

## 2023-02-25 PROCEDURE — 74011250637 HC RX REV CODE- 250/637: Performed by: INTERNAL MEDICINE

## 2023-02-25 PROCEDURE — 80048 BASIC METABOLIC PNL TOTAL CA: CPT

## 2023-02-25 PROCEDURE — 85027 COMPLETE CBC AUTOMATED: CPT

## 2023-02-25 RX ORDER — POTASSIUM CHLORIDE 750 MG/1
40 TABLET, FILM COATED, EXTENDED RELEASE ORAL
Status: COMPLETED | OUTPATIENT
Start: 2023-02-25 | End: 2023-02-25

## 2023-02-25 RX ADMIN — LOSARTAN POTASSIUM 25 MG: 25 TABLET, FILM COATED ORAL at 08:16

## 2023-02-25 RX ADMIN — Medication 50 MG: at 08:16

## 2023-02-25 RX ADMIN — CETIRIZINE HYDROCHLORIDE 10 MG: 10 TABLET, FILM COATED ORAL at 08:16

## 2023-02-25 RX ADMIN — THERA TABS 1 TABLET: TAB at 08:16

## 2023-02-25 RX ADMIN — SODIUM CHLORIDE, PRESERVATIVE FREE 10 ML: 5 INJECTION INTRAVENOUS at 14:00

## 2023-02-25 RX ADMIN — SODIUM CHLORIDE, PRESERVATIVE FREE 10 ML: 5 INJECTION INTRAVENOUS at 05:22

## 2023-02-25 RX ADMIN — POTASSIUM CHLORIDE 40 MEQ: 750 TABLET, FILM COATED, EXTENDED RELEASE ORAL at 11:09

## 2023-02-25 RX ADMIN — CHOLECALCIFEROL TAB 25 MCG (1000 UNIT) 2000 UNITS: 25 TAB at 08:16

## 2023-02-25 NOTE — PROGRESS NOTES
Bedside shift change report given to Vicki Dallas RN (oncoming nurse) by Claudy Garvin RN (offgoing nurse). Report included the following information SBAR, Kardex, Procedure Summary, Intake/Output, MAR, Accordion, Recent Results, and Med Rec Status.

## 2023-02-25 NOTE — DISCHARGE SUMMARY
Hospitalist Discharge Note    NAME: Amy Altamirano   :  1960   MRN:  [id number]     Admit date: 2023    Discharge date: 23    PCP: Courtney Infante NP    Discharge Diagnoses:    Discharge Medications:  Current Discharge Medication List        CONTINUE these medications which have NOT CHANGED    Details   cranberry fruit extract (CRANBERRY EXTRACT PO) Take  by mouth daily as needed. coenzyme Q-10 (CO Q-10) 30 mg capsule Take 30 mg by mouth daily. losartan (COZAAR) 25 mg tablet TAKE 1 TABLET BY MOUTH EVERY DAY  Qty: 90 Tablet, Refills: 0    Associated Diagnoses: Essential hypertension with goal blood pressure less than 140/90      cyanocobalamin (VITAMIN B12) 100 mcg tablet 0 Refills      fexofenadine HCl (ALLEGRA PO) Take  by mouth daily. LYSINE PO Take  by mouth daily. cholecalciferol, vitamin D3, 2,000 unit tab Take 2,000 Units by mouth daily. CALCIUM CARBONATE/VITAMIN D3 (CALCIUM + D PO) Take 1 Tablet by mouth daily. Total 770 mg daily calcium and 1000 units vitamin d      MULTIVITAMIN PO Take 1 Tab by mouth daily. STOP taking these medications       Plenvu 140-9-5.2 gram ppks Comments:   Reason for Stopping:                Follow-up Information       Follow up With Specialties Details Why Contact Info    Courtney Infante NP Family Medicine Schedule an appointment as soon as possible for a visit in 1 week(s) follow up bleeding, recheck blood counts [address]      Judi Wolfe MD Gastroenterology Schedule an appointment as soon as possible for a visit in 6 month(s)  [address]. 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 Passing bloody BMs after polypectomy  Status post sigmoidoscopy with control of bleeding with epi injection and clips  Visible vessel at base   Passing small clots, but no bleeding like at home  HgB12.6 --> 12.2  Allow clear liquid diet  Advance diet  Discharge home today if okay with GI  Will need repeat colonoscopy as outpatient per GI given the dysplasia     Essential hypertension POA  Continue with Cozaar     Code Status: Full code  Surrogate Decision Maker:     DVT Prophylaxis: sCDs  GI Prophylaxis: not indicated     Baseline: Ambulatory    Body mass index is 19.57 kg/m². Subjective:     Chief Complaint / Reason for Physician Visit  \"I passed a few clots, but no bloody BMs\". Discussed with RN events overnight. No complaints, bleeding she was having at home has resolved  No other complaints    Review of Systems:  Symptom Y/N Comments  Symptom Y/N Comments   Fever/Chills n   Chest Pain n    Poor Appetite    Edema     Cough n   Abdominal Pain n    Sputum    Joint Pain     SOB/RODRIGUEZ n   Headache     Nausea/vomit n   Tolerating PT/OT     Diarrhea n   Tolerating Diet y    Constipation    Other       Could NOT obtain due to:      Objective:     VITALS:   Last 24hrs VS reviewed since prior progress note.  Most recent are:  Patient Vitals for the past 24 hrs:   Temp Pulse Resp BP SpO2   02/25/23 1450 97.3 °F (36.3 °C) 66 18 114/78 98 %   02/25/23 0726 97.9 °F (36.6 °C) 61 18 (!) 125/90 97 %   02/25/23 0300 97.5 °F (36.4 °C) 64 16 (!) 149/91 98 %   02/24/23 2000 97.5 °F (36.4 °C) 65 16 (!) 148/91 100 %   02/24/23 1650 98.1 °F (36.7 °C) 60 18 (!) 140/94 99 %   02/24/23 1601 -- 75 15 (!) 147/91 98 % 02/24/23 1557 -- 80 13 (!) 159/91 97 %   02/24/23 1554 -- 75 13 (!) 142/81 98 %   02/24/23 1543 -- 77 16 (!) 90/53 99 %         Intake/Output Summary (Last 24 hours) at 2/25/2023 1536  Last data filed at 2/25/2023 0344  Gross per 24 hour   Intake 300 ml   Output --   Net 300 ml          Wt Readings from Last 12 Encounters:   02/25/23 55 kg (121 lb 4.1 oz)   02/20/23 54.4 kg (120 lb)   01/03/23 55.2 kg (121 lb 9.6 oz)   09/12/22 56.2 kg (124 lb)   07/12/22 57.6 kg (127 lb)   11/02/21 54 kg (119 lb)   06/18/21 54.5 kg (120 lb 3.2 oz)   03/23/21 55.8 kg (123 lb)   09/23/20 54 kg (119 lb)   08/05/20 54.4 kg (120 lb)   06/24/20 53.1 kg (117 lb)   03/24/20 52.2 kg (115 lb)       PHYSICAL EXAM:    I had a face to face encounter and independently examined this patient on 02/25/23 as outlined below:    General: Alert, cooperative, no acute distress    EENT:  PERRL. Anicteric sclerae. MMM  Resp:  CTA bilaterally, no wheezing or rales. No accessory muscle use  CV:  Regular  rhythm,  No edema  GI:  Soft, Non distended, Non tender. +Bowel sounds, no rebound  Neurologic:  Alert and oriented X 3, normal speech, non focal motor exam  Psych:   Good insight. Not anxious nor agitated  Skin:  No rashes. No jaundice    Reviewed most current lab test results and cultures  YES  Reviewed most current radiology test results   YES  Review and summation of old records today    NO  Reviewed patient's current orders and MAR    YES  PMH/SH reviewed - no change compared to H&P  ________________________________________________________________________  Care Plan discussed with:    Comments   Patient y    Family      RN y    Care Manager     Consultant                        Multidiciplinary team rounds were held today with , nursing, pharmacist and clinical coordinator. Patient's plan of care was discussed; medications were reviewed and discharge planning was addressed. ________________________________________________________________________      Comments   >50% of visit spent in counseling and coordination of care     ________________________________________________________________________  Le Spangler MD     Procedures: see electronic medical records for all procedures/Xrays and details which were not copied into this note but were reviewed prior to creation of Plan. LABS:  I reviewed today's most current labs and imaging studies.   Pertinent labs include:  Recent Labs     02/25/23  0203 02/24/23  1427   WBC 5.1 5.0   HGB 12.2 12.6   HCT 37.1 38.0    176       Recent Labs     02/25/23  0203      K 3.3*      CO2 28   *   BUN 9   CREA 0.62   CA 8.7 jaundice    Reviewed most current lab test results and cultures  YES  Reviewed most current radiology test results   YES  Review and summation of old records today    NO  Reviewed patient's current orders and MAR    YES  PMH/SH reviewed - no change compared to H&P  ________________________________________________________________________  Care Plan discussed with:    Comments   Patient y    Family      RN y    Care Manager     Consultant                        Multidiciplinary team rounds were held today with , nursing, pharmacist and clinical coordinator. Patient's plan of care was discussed; medications were reviewed and discharge planning was addressed. ________________________________________________________________________      Comments   >50% of visit spent in counseling and coordination of care     ________________________________________________________________________  Steve Wheatley MD     Procedures: see electronic medical records for all procedures/Xrays and details which were not copied into this note but were reviewed prior to creation of Plan. LABS:  I reviewed today's most current labs and imaging studies.   Pertinent labs include:  Recent Labs     02/25/23  0203 02/24/23  1427   WBC 5.1 5.0   HGB 12.2 12.6   HCT 37.1 38.0    176       Recent Labs     02/25/23  0203      K 3.3*      CO2 28   *   BUN 9   CREA 0.62   CA 8.7

## 2023-02-25 NOTE — PROGRESS NOTES
Problem: Falls - Risk of  Goal: *Absence of Falls  Description: Document [facility] Fall Risk and appropriate interventions in the flowsheet.   Outcome: Progressing Towards Goal  Note: Fall Risk Interventions:

## 2023-02-25 NOTE — PROGRESS NOTES
Problem: Falls - Risk of  Goal: *Absence of Falls  Description: Document [facility] Fall Risk and appropriate interventions in the flowsheet. Outcome: Progressing Towards Goal  Note: Fall Risk Interventions:                                Problem: Patient Education: Go to Patient Education Activity  Goal: Patient/Family Education  Outcome: Progressing Towards Goal     Problem: Pain  Goal: *Control of Pain  Outcome: Progressing Towards Goal  Goal: *PALLIATIVE CARE:  Alleviation of Pain  Outcome: Progressing Towards Goal     Problem: Patient Education: Go to Patient Education Activity  Goal: Patient/Family Education  Outcome: Progressing Towards Goal     Problem: Pressure Injury - Risk of  Goal: *Prevention of pressure injury  Description: Document Tan Scale and appropriate interventions in the flowsheet.   Outcome: Progressing Towards Goal  Note: Pressure Injury Interventions:                                            Problem: Patient Education: Go to Patient Education Activity  Goal: Patient/Family Education  Outcome: Progressing Towards Goal     Problem: Patient Education: Go to Patient Education Activity  Goal: Patient/Family Education  Outcome: Progressing Towards Goal     Problem: Upper and Lower GI Bleed: Day 1  Goal: Off Pathway (Use only if patient is Off Pathway)  Outcome: Progressing Towards Goal  Goal: Activity/Safety  Outcome: Progressing Towards Goal  Goal: Consults, if ordered  Outcome: Progressing Towards Goal  Goal: Diagnostic Test/Procedures  Outcome: Progressing Towards Goal  Goal: Nutrition/Diet  Outcome: Progressing Towards Goal  Goal: Discharge Planning  Outcome: Progressing Towards Goal  Goal: Medications  Outcome: Progressing Towards Goal  Goal: Respiratory  Outcome: Progressing Towards Goal  Goal: Treatments/Interventions/Procedures  Outcome: Progressing Towards Goal  Goal: Psychosocial  Outcome: Progressing Towards Goal  Goal: *Optimal pain control at patient's stated goal  Outcome: Progressing Towards Goal  Goal: *Hemodynamically stable  Outcome: Progressing Towards Goal  Goal: *Demonstrates progressive activity  Outcome: Progressing Towards Goal

## 2023-02-25 NOTE — PROGRESS NOTES
Bedside shift change report given to Danielle Thao RN (oncoming nurse) by Jonah Lyon RN (offgoing nurse). Report included the following information SBAR, Kardex, Procedure Summary, Intake/Output, MAR, Accordion, Recent Results, and Med Rec Status.

## 2023-02-25 NOTE — DISCHARGE INSTRUCTIONS
Patient Discharge Instructions    Shadia Burciaga / [id number] : 1960    Admitted 2023 Discharged: 2023         DISCHARGE DIAGNOSIS:     Lower GI bleed after polypectomy      What to do at Home    1. Recommended diet: Resume previous diet    2. Recommended activity: Activity as tolerated    3. If you experience any of the following symptoms then please call your primary care physician or return to the emergency room if you cannot get hold of your doctor:   Fevers > 100.5, chills   Nausea or vomiting, persistent diarrhea > 24 hours   Blood in stool or black stools   Chest pain or SOB    No blood thinners like aspirin or NSAIDS like motrin/ibuprofen, aleve/naprosyn for next 10 days   Use tylenol as needed for pain during this period        Information obtained by :  I understand that if any problems occur once I am at home I am to contact my physician. I understand and acknowledge receipt of the instructions indicated above.                                                                                                                                            Physician's or R.N.'s Signature                                                                  Date/Time                                                                                                                                              Patient or Representative Signature                                                          Date/Time

## 2023-02-25 NOTE — PROGRESS NOTES
[address]  [phone]                                                GI PROGRESS NOTE      NAME: Allan Sicard   :  1960   MRN:  [id number]              Subjective:   Discussed with RN events overnight. Complaint Y/N Description   Abdominal Pain     Hematemesis     Hematochezia     Melena     Constipation     Diarrhea     Dyspepsia     Dysphagia     Jaundiced         Review of Systems:    Symptom Y/N Comments  Symptom Y/N Comments   Fever/Chills    Chest Pain     Cough    Abdominal Pain     Sputum    Joint Pain     SOB/RODRIGUEZ    Pruritis/Rash     Nausea/vomit    Tolerating PT/OT     Diarrhea    Tolerating Diet     Constipation    Other       Could not obtain due to AMS vs intubation        Objective:     VITALS:   Last 24hrs VS reviewed since prior progress note. Most recent are:  Visit Vitals  /78   Pulse 66   Temp 97.3 °F (36.3 °C)   Resp 18   Ht 5' 6\" (1.676 m)   Wt 55 kg (121 lb 4.1 oz)   SpO2 98%   Breastfeeding No   BMI 19.57 kg/m²       Intake/Output Summary (Last 24 hours) at 2023 1510  Last data filed at 2023 0344  Gross per 24 hour   Intake 500 ml   Output --   Net 500 ml       PHYSICAL EXAM:  General: WD, WN. Alert, cooperative, no acute distress    HEENT: NC, Atraumatic. PERRLA, EOMI. Anicteric sclerae. Lungs:  CTA Bilaterally. No Wheezing/Rhonchi/Rales. Heart:  Regular  rhythm,  No murmur (), No Rubs, No Gallops  Abdomen: Soft, Non distended, Non tender. +Bowel sounds, no HSM  Extremities: No c/c/e  Neurologic:  CN 2-12 gi, Alert and oriented X 3. No acute neurological distress   Psych:   Good insight. Not anxious nor agitated.     Lab Data Reviewed: (see below)    Medications Reviewed: (see below)    PMH/SH reviewed - no change compared to H&P  ________________________________________________________________________  Total time spent with patient: 20 minutes     Critical Care Provided     Minutes non procedure based    Care Plan discussed with:  Patient    Family     RN    Care Manager    Consultant/Specialist:       >50% of visit spent in counseling and coordination of care       Recommended Disposition:   Home with Family    HH/PT/OT/RN    SNF/LTC    BEE      Code Status:  Full Code    DNR/DNI      ________________________________________________________________________  ________________________________________________________________________________________________________________________________________________  Procedures: see electronic medical records for all procedures/Xrays and details which  were not copied into this note but were reviewed prior to creation of Plan. LABS:  Recent Labs     02/25/23  0203 02/24/23  1427   WBC 5.1 5.0   HGB 12.2 12.6   HCT 37.1 38.0    176     Recent Labs     02/25/23  0203      K 3.3*      CO2 28   BUN 9   CREA 0.62   *   CA 8.7     No results for input(s): AP, TBIL, TP, ALB, GLOB, GGT, AML, LPSE in the last 72 hours. No lab exists for component: SGOT, GPT, AMYP, HLPSE  No results for input(s): INR, PTP, APTT, INREXT in the last 72 hours. No results for input(s): FE, TIBC, PSAT, FERR in the last 72 hours. No results found for: FOL, RBCF  No results for input(s): PH, PCO2, PO2 in the last 72 hours. No results for input(s): CPK, CKMB in the last 72 hours.     No lab exists for component: TROPONINI  Lab Results   Component Value Date/Time    Color Yellow 03/03/2018 09:25 AM    Appearance Clear 03/03/2018 09:25 AM    pH (UA) 8.0 (H) 03/03/2018 09:25 AM    Ketone Negative 03/03/2018 09:25 AM    Bilirubin Negative 03/03/2018 09:25 AM    Nitrites Negative 03/03/2018 09:25 AM    Leukocyte Esterase Negative 03/03/2018 09:25 AM    Bacteria Few 12/30/2017 10:16 AM    WBC 0-5 12/30/2017 10:16 AM    RBC 0-2 12/30/2017 10:16 AM       MEDICATIONS:  Current Facility-Administered Medications   Medication Dose Route Frequency    sodium chloride (NS) flush 5-40 mL  5-40 mL IntraVENous Q8H    sodium chloride (NS) flush 5-40 mL  5-40 mL IntraVENous PRN    losartan (COZAAR) tablet 25 mg  25 mg Oral DAILY    therapeutic multivitamin (THERAGRAN) tablet 1 Tablet  1 Tablet Oral DAILY    cetirizine (ZYRTEC) tablet 10 mg  10 mg Oral DAILY    co-enzyme Q-10 (CO Q-10) capsule 50 mg  50 mg Oral DAILY    cholecalciferol (VITAMIN D3) (1000 Units /25 mcg) tablet 2,000 Units  2,000 Units Oral DAILY    sodium chloride (NS) flush 5-40 mL  5-40 mL IntraVENous Q8H    sodium chloride (NS) flush 5-40 mL  5-40 mL IntraVENous PRN    acetaminophen (TYLENOL) tablet 650 mg  650 mg Oral Q6H PRN    Or    acetaminophen (TYLENOL) suppository 650 mg  650 mg Rectal Q6H PRN    polyethylene glycol (MIRALAX) packet 17 g  17 g Oral DAILY PRN    ondansetron (ZOFRAN ODT) tablet 4 mg  4 mg Oral Q8H PRN    Or    ondansetron (ZOFRAN) injection 4 mg  4 mg IntraVENous Q6H PRN            Assessment:   Doing well  Not bleeding  Ok to go home on regular diet  No blood thinners  No heavy lifting for a few days     Plan:     Discharge    Yasmani Howard MD

## 2023-02-25 NOTE — PROGRESS NOTES
Transition of Care Plan:    RUR: N/A, patient is in obs   Disposition: Home with follow-up appts   If SNF or IPR: Date FOC offered: N/A  Follow up appointments: PCP & Specialists as indicated   DME needed: N/A  Transportation at Discharge: The patient's  will be transporting her home   [place] or means to access home: Yes      IM Medicare Letter: N/A  Is patient a  and connected with the [address]? N/A               If yes, was [place] transfer form completed and [facility] notified? Caregiver Contact: Mary Ann Gonzalez, , Phone: [phone]  Discharge Caregiver contacted prior to discharge? CM will contact her  if she requests this   Care Conference needed?: No    [facility] Outpatient Observation Notice (Joesph Bowen) provided to patient/representative with verbal explanation of the notice. Time allotted for questions regarding the notice. Patient /representative provided a completed copy of the VOON notice. Copy placed on bedside chart. Reason for Admission:  Rectal Bleed                RUR Score: N/A, patient is in obs                     Plan for utilizing home health: N/A, the patient is completely independent in her ADLs and IADLs          PCP: First and Last name:  Nathan Kimbrough NP   Name of Practice:    Are you a current patient: Yes/No: Yes   Approximate date of last visit: The patient saw her PCP in November of 2022    Can you participate in a virtual visit with your PCP: Yes                    Current Advanced Directive/Advance Care Plan: Full Code    Healthcare Decision Maker: Mary Ann Gonzalez, , Phone: [phone]               Transition of Care Plan:    CM met with the patient at bedside to discuss dispo plan. The patient lives in a 1 level home with a finished basement with 3 steps to enter. She lives with her  and one of her sons. She has 1 other son that lives locally. Her family is very supportive. At baseline, the patient is completely independent in her ADLs and IADLs.  She uses no assistive device when ambulating. She is able to drive and her  will be transporting her home. 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 She uses the [facility] for her medications. She has never had [facility] services for been to a SNF/IPR facility in the past.     Care Management Interventions  PCP Verified by CM: Yes  Mode of Transport at Discharge:  Other (see comment) (The patient's  will be transporting her home )  Transition of Care Consult (CM Consult): Discharge Planning  MyChart Signup: No  Discharge Durable Medical Equipment: No  Physical Therapy Consult: No  Occupational Therapy Consult: No  Speech Therapy Consult: No  Support Systems: Spouse/Significant Other, Child(rishabh)  Confirm Follow Up Transport: Self  The Patient and/or Patient Representative was Provided with a Choice of Provider and Agrees with the Discharge Plan?: Yes  Name of the Patient Representative Who was Provided with a Choice of Provider and Agrees with the Discharge Plan: Nga Wang  Freedom of Choice List was Provided with Basic Dialogue that Supports the Patient's Individualized Plan of Care/Goals, Treatment Preferences and Shares the Quality Data Associated with the Providers?: No  [place] Resource Information Provided?: No  Discharge Location  Patient Expects to be Discharged to[de-identified] Home     Tyrone honeycutt LCSW

## 2023-02-25 NOTE — PROGRESS NOTES
Problem: Falls - Risk of  Goal: *Absence of Falls  Description: Document Joseluis March Fall Risk and appropriate interventions in the flowsheet. Outcome: Progressing Towards Goal  Note: Fall Risk Interventions:                                Problem: Patient Education: Go to Patient Education Activity  Goal: Patient/Family Education  Outcome: Progressing Towards Goal     Problem: Pain  Goal: *Control of Pain  Outcome: Progressing Towards Goal  Goal: *PALLIATIVE CARE:  Alleviation of Pain  Outcome: Progressing Towards Goal     Problem: Patient Education: Go to Patient Education Activity  Goal: Patient/Family Education  Outcome: Progressing Towards Goal     Problem: Pressure Injury - Risk of  Goal: *Prevention of pressure injury  Description: Document Tan Scale and appropriate interventions in the flowsheet.   Outcome: Progressing Towards Goal  Note: Pressure Injury Interventions:                                            Problem: Patient Education: Go to Patient Education Activity  Goal: Patient/Family Education  Outcome: Progressing Towards Goal     Problem: Patient Education: Go to Patient Education Activity  Goal: Patient/Family Education  Outcome: Progressing Towards Goal     Problem: Upper and Lower GI Bleed: Day 1  Goal: Off Pathway (Use only if patient is Off Pathway)  Outcome: Progressing Towards Goal  Goal: Activity/Safety  Outcome: Progressing Towards Goal  Goal: Consults, if ordered  Outcome: Progressing Towards Goal  Goal: Diagnostic Test/Procedures  Outcome: Progressing Towards Goal  Goal: Nutrition/Diet  Outcome: Progressing Towards Goal  Goal: Discharge Planning  Outcome: Progressing Towards Goal  Goal: Medications  Outcome: Progressing Towards Goal  Goal: Respiratory  Outcome: Progressing Towards Goal  Goal: Treatments/Interventions/Procedures  Outcome: Progressing Towards Goal  Goal: Psychosocial  Outcome: Progressing Towards Goal  Goal: *Optimal pain control at patient's stated goal  Outcome: Progressing Towards Goal  Goal: *Hemodynamically stable  Outcome: Progressing Towards Goal  Goal: *Demonstrates progressive activity  Outcome: Progressing Towards Goal

## 2023-02-25 NOTE — PROGRESS NOTES
Discharge instructions reviewed with pt, pt verbalized understanding. Copy of discharge instructions given to pt. IV removed without incident. Pt has all of belongings. No c/o pain or distress. No n/v/d. Patient ambulatory at discharge with her .

## 2023-02-25 NOTE — PROGRESS NOTES
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 Passing bloody BMs after polypectomy  Status post sigmoidoscopy with control of bleeding with epi injection and clips  Visible vessel at base   Passing small clots, but no bleeding like at home  HgB12.6 --> 12.2  Allow clear liquid diet  Advance diet  Discharge home today if okay with GI  Will need repeat colonoscopy as outpatient per GI given the dysplasia     Essential hypertension POA  Continue with Cozaar     Code Status: Full code  Surrogate Decision Maker:     DVT Prophylaxis: sCDs  GI Prophylaxis: not indicated     Baseline: Ambulatory    Body mass index is 19.57 kg/m². Subjective:     Chief Complaint / Reason for Physician Visit  \"I passed a few clots, but no bloody BMs\". Discussed with RN events overnight. No complaints, bleeding she was having at home has resolved  No other complaints    Review of Systems:  Symptom Y/N Comments  Symptom Y/N Comments   Fever/Chills n   Chest Pain n    Poor Appetite    Edema     Cough n   Abdominal Pain n    Sputum    Joint Pain     SOB/RODRIGUEZ n   Headache     Nausea/vomit n   Tolerating PT/OT     Diarrhea n   Tolerating Diet y    Constipation    Other       Could NOT obtain due to:      Objective:     VITALS:   Last 24hrs VS reviewed since prior progress note.  Most recent are:  Patient Vitals for the past 24 hrs:   Temp Pulse Resp BP SpO2   23 0726 97.9 °F (36.6 °C) 61 18 (!) 125/90 97 %   23 0300 97.5 °F (36.4 °C) 64 16 (!) 149/91 98 %   23 2000 97.5 °F (36.4 °C) 65 16 (!) 148/91 100 %   23 1650 98.1 °F (36.7 °C) 60 18 (!) 140/94 99 %   02/24/23 1601 -- 75 15 (!) 147/91 98 %   02/24/23 1557 -- 80 13 (!) 159/91 97 %   02/24/23 1554 -- 75 13 (!) 142/81 98 %   02/24/23 1543 -- 77 16 (!) 90/53 99 %   02/24/23 1529 -- 77 -- (!) 98/59 --   02/24/23 1416 98.4 °F (36.9 °C) 74 18 (!) 163/102 98 %       Intake/Output Summary (Last 24 hours) at 2/25/2023 1011  Last data filed at 2/25/2023 0344  Gross per 24 hour   Intake 500 ml   Output --   Net 500 ml        Wt Readings from Last 12 Encounters:   02/25/23 55 kg (121 lb 4.1 oz)   02/20/23 54.4 kg (120 lb)   01/03/23 55.2 kg (121 lb 9.6 oz)   09/12/22 56.2 kg (124 lb)   07/12/22 57.6 kg (127 lb)   11/02/21 54 kg (119 lb)   06/18/21 54.5 kg (120 lb 3.2 oz)   03/23/21 55.8 kg (123 lb)   09/23/20 54 kg (119 lb)   08/05/20 54.4 kg (120 lb)   06/24/20 53.1 kg (117 lb)   03/24/20 52.2 kg (115 lb)       PHYSICAL EXAM:    I had a face to face encounter and independently examined this patient on 02/25/23 as outlined below:    General: Alert, cooperative, no acute distress    EENT:  PERRL. Anicteric sclerae. MMM  Resp:  CTA bilaterally, no wheezing or rales. No accessory muscle use  CV:  Regular  rhythm,  No edema  GI:  Soft, Non distended, Non tender. +Bowel sounds, no rebound  Neurologic:  Alert and oriented X 3, normal speech, non focal motor exam  Psych:   Good insight. Not anxious nor agitated  Skin:  No rashes. No jaundice    Reviewed most current lab test results and cultures  YES  Reviewed most current radiology test results   YES  Review and summation of old records today    NO  Reviewed patient's current orders and MAR    YES  PMH/SH reviewed - no change compared to H&P  ________________________________________________________________________  Care Plan discussed with:    Comments   Patient y    Family      RN y    Care Manager     Consultant                        Multidiciplinary team rounds were held today with , nursing, pharmacist and clinical coordinator.   Patient's plan of care was discussed; medications were reviewed and discharge planning was addressed. ________________________________________________________________________      Comments   >50% of visit spent in counseling and coordination of care     ________________________________________________________________________  Joel Lopez MD     Procedures: see electronic medical records for all procedures/Xrays and details which were not copied into this note but were reviewed prior to creation of Plan. LABS:  I reviewed today's most current labs and imaging studies.   Pertinent labs include:  Recent Labs     02/25/23  0203 02/24/23  1427   WBC 5.1 5.0   HGB 12.2 12.6   HCT 37.1 38.0    176     Recent Labs     02/25/23  0203      K 3.3*      CO2 28   *   BUN 9   CREA 0.62   CA 8.7

## 2023-02-28 ENCOUNTER — TELEPHONE (OUTPATIENT)
Dept: FAMILY MEDICINE CLINIC | Age: 63
End: 2023-02-28

## 2023-02-28 NOTE — TELEPHONE ENCOUNTER
Care Transitions Initial Follow Up Call    Outreach made within 2 business days of discharge: Yes    Patient: Hugo Salas Patient : 1960   MRN: [id number]  Reason for Admission: Rectal bleeding  Discharge Date: 23       Spoke with: Left Voice Message    Discharge department/facility: [facility]        Scheduled appointment with PCP within 7-14 days    Follow Up  No future appointments.     Surya Dean RN

## 2023-03-09 ENCOUNTER — OFFICE VISIT (OUTPATIENT)
Dept: FAMILY MEDICINE CLINIC | Age: 63
End: 2023-03-09

## 2023-03-09 VITALS
WEIGHT: 121 LBS | OXYGEN SATURATION: 97 % | RESPIRATION RATE: 16 BRPM | HEIGHT: 66 IN | SYSTOLIC BLOOD PRESSURE: 115 MMHG | TEMPERATURE: 99.1 F | BODY MASS INDEX: 19.44 KG/M2 | DIASTOLIC BLOOD PRESSURE: 75 MMHG | HEART RATE: 83 BPM

## 2023-03-09 DIAGNOSIS — Z09 HOSPITAL DISCHARGE FOLLOW-UP: Primary | ICD-10-CM

## 2023-03-09 DIAGNOSIS — R82.90 ABNORMAL URINE ODOR: ICD-10-CM

## 2023-03-09 DIAGNOSIS — D62 ACUTE BLOOD LOSS ANEMIA: ICD-10-CM

## 2023-03-09 DIAGNOSIS — E87.6 HYPOKALEMIA: ICD-10-CM

## 2023-03-09 NOTE — PROGRESS NOTES
Hospital follow up Progress Note    Patient: Dale Ross  : 1960  PCP: Martha Solitario NP    Date of office visit: 3/9/2023   Date of admission: 23  Date of discharge: 23  Hospital: [facility]    Call initiated w/i 2 business dates of discharge: Yes   Date of the most recent call to the patient: 2023  3:56 PM      Assessment/Plan:   Diagnoses and all orders for this visit:    1. Hospital discharge follow-up  -     NM [address] MEDS RECONCILED W/CURRENT MED LIST  -     FULL CODE  - Follow up with GI as instructed    2. Hypokalemia  -     METABOLIC PANEL, BASIC; Future  - Last Potassium 3.3    3. Acute blood loss anemia  -     CBC WITH AUTOMATED DIFF; Future  - No bleeding episodes since discharge    4. Abnormal urine odor  -     URINALYSIS W/ REFLEX CULTURE; Future  -     CULTURE, URINE  - Increase hydration        Follow-up and Dispositions    Return if symptoms worsen or fail to improve. Subjective:   Dale Ross is a 57 y.o. female presenting today for follow-up after hospital discharge. This encounter and supporting documentation was reviewed if available. Medication reconciliation was performed today. The main problem requiring admission was rectal bleeding. Complications during admission: none    Ms. Zohreh Manley underwent a planned flexible sigmoidoscopy with endoscopic mucosal resection by Dr. Shefali Short on 2023. Proximal rectal polyp had been removed. She presented to the emergency room on 2023 for rectal bleeding as recommended by GI. On 2023 Ms. Sindhu Powell underwent a flexible sigmoidoscopy with control of bleeding by Dr. Shefali Short. Was admitted overnight for observation. No further bleeding episodes and was discharged home on the . Returning home Ms. Sindhu Powell has been doing well. She is eating and drinking without difficulty. She is having regular bowel movements without pain or bleeding.       Medications marked \"taking\" at this time:  Prior to Admission medications    Medication Sig Start Date End Date Taking? Authorizing Provider   cranberry fruit extract (CRANBERRY EXTRACT PO) Take  by mouth daily as needed. Yes Provider, Historical   coenzyme Q-10 (CO Q-10) 30 mg capsule Take 30 mg by mouth daily. Yes Provider, Historical   losartan (COZAAR) 25 mg tablet TAKE 1 TABLET BY MOUTH EVERY DAY 12/24/22  Yes Laura Hansen, NP   cyanocobalamin (VITAMIN B12) 100 mcg tablet 0 Refills 6/21/21  Yes Provider, Historical   fexofenadine HCl (ALLEGRA PO) Take  by mouth daily. Yes Provider, Historical   LYSINE PO Take  by mouth daily. Yes Provider, Historical   cholecalciferol, vitamin D3, 2,000 unit tab Take 2,000 Units by mouth daily. Yes Provider, Historical   CALCIUM CARBONATE/VITAMIN D3 (CALCIUM + D PO) Take 1 Tablet by mouth daily. Total 770 mg daily calcium and 1000 units vitamin d   Yes Provider, Historical   MULTIVITAMIN PO Take 1 Tab by mouth daily. Yes Provider, Historical        ROS:  History obtained from the patient and ROS is positive for abnormal urinary odor.         Objective:   Visit Vitals  /75 (BP 1 Location: Left upper arm, BP Patient Position: Sitting, BP Cuff Size: Adult)   Pulse 83   Temp 99.1 °F (37.3 °C) (Temporal)   Resp 16   Ht 5' 6\" (1.676 m)   Wt 121 lb (54.9 kg)   SpO2 97%   BMI 19.53 kg/m²        Physical Examination: General appearance - alert, well appearing, and in no distress, oriented to person, place, and time, and normal appearing weight  Mental status - alert, oriented to person, place, and time, normal mood, behavior, speech, dress, motor activity, and thought processes  Heart - normal rate, regular rhythm, normal S1, S2, no murmurs, rubs, clicks or gallops  Abdomen - soft, nontender, nondistended, no masses or organomegaly  Neurological - alert, oriented, normal speech, no focal findings or movement disorder noted, motor and sensory grossly normal bilaterally  Musculoskeletal - no joint tenderness, deformity or swelling  Skin - normal coloration and turgor, no rashes, no suspicious skin lesions noted       We discussed the expected course, resolution and complications of the diagnosis(es) in detail. Medication risks, benefits, costs, interactions, and alternatives were discussed as indicated. I advised her to contact the office if her condition worsens, changes or fails to improve as anticipated. She expressed understanding with the diagnosis(es) and plan.      Dante Saavedra NP

## 2023-03-09 NOTE — PROGRESS NOTES
Chief Complaint   Patient presents with    Hospital Follow Up     2/24/23-2/25/23 at [facility] for rectal bleed         1. \"Have you been to the ER, urgent care clinic since your last visit? Hospitalized since your last visit? \" Yes Where: above    2. \"Have you seen or consulted any other health care providers outside of the [address] [place] [address] since your last visit? \" Yes Where: [facility]      3. For patients over 45: Has the patient had a colonoscopy? Yes - no Care Gap present     If the patient is female:    4. For patients over 40: Has the patient had a mammogram? Yes - no Care Gap present    5. For patients over 21: Has the patient had a pap smear?  Yes - no Care Gap present     3 most recent PHQ Screens 3/9/2023   Little interest or pleasure in doing things Not at all   Feeling down, depressed, irritable, or hopeless Not at all   Total Score PHQ 2 0   Trouble falling or staying asleep, or sleeping too much -   Feeling tired or having little energy -   Poor appetite, weight loss, or overeating -   Feeling bad about yourself - or that you are a failure or have let yourself or your family down -   Trouble concentrating on things such as school, work, reading, or watching TV -   Moving or speaking so slowly that other people could have noticed; or the opposite being so fidgety that others notice -   Thoughts of being better off dead, or hurting yourself in some way -   PHQ 9 Score -   How difficult have these problems made it for you to do your work, take care of your home and get along with others -       Health Maintenance Due   Topic Date Due    Shingles Vaccine (1 of 2) Never done    COVID-19 Vaccine (3 - Booster for Sarmiento Peter series) 03/26/2021    Flu Vaccine (1) 08/01/2022 Meal tray ordered per pt request.     Franca Marshall RN  03/12/22 2093

## 2023-03-10 LAB
ANION GAP SERPL CALC-SCNC: 6 MMOL/L (ref 5–15)
APPEARANCE UR: ABNORMAL
BACTERIA URNS QL MICRO: ABNORMAL /HPF
BASOPHILS # BLD: 0.1 K/UL (ref 0–0.1)
BASOPHILS NFR BLD: 1 % (ref 0–1)
BILIRUB UR QL: NEGATIVE
BUN SERPL-MCNC: 17 MG/DL (ref 6–20)
BUN/CREAT SERPL: 20 (ref 12–20)
CALCIUM SERPL-MCNC: 9.4 MG/DL (ref 8.5–10.1)
CHLORIDE SERPL-SCNC: 102 MMOL/L (ref 97–108)
CO2 SERPL-SCNC: 32 MMOL/L (ref 21–32)
COLOR UR: ABNORMAL
CREAT SERPL-MCNC: 0.84 MG/DL (ref 0.55–1.02)
DIFFERENTIAL METHOD BLD: NORMAL
EOSINOPHIL # BLD: 0.1 K/UL (ref 0–0.4)
EOSINOPHIL NFR BLD: 1 % (ref 0–7)
EPITH CASTS URNS QL MICRO: ABNORMAL /LPF
ERYTHROCYTE [DISTWIDTH] IN BLOOD BY AUTOMATED COUNT: 12.6 % (ref 11.5–14.5)
GLUCOSE SERPL-MCNC: 98 MG/DL (ref 65–100)
GLUCOSE UR STRIP.AUTO-MCNC: NEGATIVE MG/DL
HCT VFR BLD AUTO: 40.7 % (ref 35–47)
HGB BLD-MCNC: 13.2 G/DL (ref 11.5–16)
HGB UR QL STRIP: NEGATIVE
HYALINE CASTS URNS QL MICRO: ABNORMAL /LPF (ref 0–5)
IMM GRANULOCYTES # BLD AUTO: 0 K/UL (ref 0–0.04)
IMM GRANULOCYTES NFR BLD AUTO: 0 % (ref 0–0.5)
KETONES UR QL STRIP.AUTO: NEGATIVE MG/DL
LEUKOCYTE ESTERASE UR QL STRIP.AUTO: ABNORMAL
LYMPHOCYTES # BLD: 2 K/UL (ref 0.8–3.5)
LYMPHOCYTES NFR BLD: 26 % (ref 12–49)
MCH RBC QN AUTO: 29.7 PG (ref 26–34)
MCHC RBC AUTO-ENTMCNC: 32.4 G/DL (ref 30–36.5)
MCV RBC AUTO: 91.5 FL (ref 80–99)
MONOCYTES # BLD: 0.4 K/UL (ref 0–1)
MONOCYTES NFR BLD: 6 % (ref 5–13)
NEUTS SEG # BLD: 5 K/UL (ref 1.8–8)
NEUTS SEG NFR BLD: 66 % (ref 32–75)
NITRITE UR QL STRIP.AUTO: POSITIVE
NRBC # BLD: 0 K/UL (ref 0–0.01)
NRBC BLD-RTO: 0 PER 100 WBC
PH UR STRIP: 6.5 (ref 5–8)
PLATELET # BLD AUTO: 246 K/UL (ref 150–400)
PMV BLD AUTO: 9.2 FL (ref 8.9–12.9)
POTASSIUM SERPL-SCNC: 4.5 MMOL/L (ref 3.5–5.1)
PROT UR STRIP-MCNC: NEGATIVE MG/DL
RBC # BLD AUTO: 4.45 M/UL (ref 3.8–5.2)
RBC #/AREA URNS HPF: ABNORMAL /HPF (ref 0–5)
SODIUM SERPL-SCNC: 140 MMOL/L (ref 136–145)
SP GR UR REFRACTOMETRY: 1.01 (ref 1–1.03)
UA: UC IF INDICATED,UAUC: ABNORMAL
UROBILINOGEN UR QL STRIP.AUTO: 0.2 EU/DL (ref 0.2–1)
WBC # BLD AUTO: 7.6 K/UL (ref 3.6–11)
WBC URNS QL MICRO: ABNORMAL /HPF (ref 0–4)

## 2023-03-12 LAB
BACTERIA SPEC CULT: ABNORMAL
CC UR VC: ABNORMAL
SERVICE CMNT-IMP: ABNORMAL

## 2023-03-13 DIAGNOSIS — N30.00 ACUTE CYSTITIS WITHOUT HEMATURIA: Primary | ICD-10-CM

## 2023-03-13 RX ORDER — NITROFURANTOIN 25; 75 MG/1; MG/1
100 CAPSULE ORAL 2 TIMES DAILY
Qty: 14 CAPSULE | Refills: 0 | Status: SHIPPED | OUTPATIENT
Start: 2023-03-13 | End: 2023-03-20

## 2023-08-02 NOTE — PROCEDURES
NAME:  Bouchra Rahman   :   1960   MRN:   [id number]     Date/Time:  2023 3:43 PM    Flexible sigmoidoscopy Operative Report    Procedure Type:   Flexible sigmoidoscopy with control of bleeding     Indications:     Hematochezia/melena, Likely post-polypectomy bleed  Pre-operative Diagnosis: see indication above  Post-operative Diagnosis:  See findings below  :  Hipolito Schilder, MD  Referring Provider: --Nathan Kimbrough NP    Exam:  Airway: clear, no airway problems anticipated  Heart: RRR, without gallops or rubs  Lungs: clear bilaterally without wheezes, crackles, or rhonchi  Abdomen: soft, nontender, nondistended, bowel sounds present  Mental Status: awake, alert and oriented to person, place and time    Sedation:  MAC anesthesia Propofol  Procedure Details:  After informed consent was obtained with all risks and benefits of procedure explained and preoperative exam completed, the patient was taken to the endoscopy suite and placed in the left lateral decubitus position. Upon sequential sedation as per above, a digital rectal exam was performed demonstrating internal hemorrhoids. The Olympus videocolonoscope  was inserted in the rectum and carefully advanced to the descending colon. The quality of preparation was good. Findings:   I first started with a therapeutic '2T' double channel endoscope. There was a large amount of clot and blood in rectum. This was suctioned. This revealed a large clot overlying post-EMR site. 2 cc of 1:10,000 Epinephrine was then injected into the base of the clot. The clot was removed and revealed a large visible vessel with prior 'Ultra' clip in middle of it. Five additional clips, including four 'Ultra' clips placed with complete closure of defect and obliteration of vessel    Specimen Removed:  None  Complications: None. EBL:  None. Impression:    I first started with a therapeutic '2T' double channel endoscope.  There was a large amount of clot Upon review of the In Basket request we were able to locate, review, and update the patient chart as requested for Mammogram. and Dexa    Any additional questions or concerns should be emailed to the Practice Liaisons via the appropriate education email address, please do not reply via In Basket.     Thank you  Rito Nichols MA and blood in rectum. This was suctioned. This revealed a large clot overlying post-endoscopic mucosal resection site. 2 cc of 1:10,000 Epinephrine was then injected into the base of the clot. The clot was removed and revealed a large visible vessel with prior 'Ultra' clip in middle of it. Five additional clips, including four 'Ultra' clips placed with complete closure of defect and obliteration of vessel    Recommendations:   Clear liquid diet only for now  Monitor for recurrent bleeding as evidenced by recurrent hematochezia, hemodynamic instability  CBC's q 12 hour    Above personally d/w pt's  and Dr. Ale Bautista    On call GI physician will see pt this weekend    Discharge Disposition:   To floor after recovery in endoscopy for admission      Sara Rebollar MD

## 2023-08-15 ENCOUNTER — HOSPITAL ENCOUNTER (OUTPATIENT)
Facility: HOSPITAL | Age: 63
Discharge: HOME | End: 2023-08-18
Payer: COMMERCIAL

## 2023-08-15 VITALS — HEIGHT: 65 IN | WEIGHT: 120 LBS | BODY MASS INDEX: 19.99 KG/M2

## 2023-08-15 DIAGNOSIS — Z12.31 VISIT FOR SCREENING MAMMOGRAM: ICD-10-CM

## 2023-08-15 PROCEDURE — 77063 BREAST TOMOSYNTHESIS BI: CPT

## 2023-10-25 ENCOUNTER — TELEPHONE (OUTPATIENT)
Age: 63
End: 2023-10-25

## 2023-10-25 NOTE — TELEPHONE ENCOUNTER
Adele called from [facility] requesting last office notes, labs and EKG to be faxed over.     Fax # [phone]    Best call back # [phone]

## 2023-12-29 ENCOUNTER — OFFICE VISIT (OUTPATIENT)
Age: 63
End: 2023-12-29
Payer: COMMERCIAL

## 2023-12-29 VITALS
DIASTOLIC BLOOD PRESSURE: 75 MMHG | RESPIRATION RATE: 16 BRPM | HEART RATE: 92 BPM | BODY MASS INDEX: 20.33 KG/M2 | WEIGHT: 122 LBS | OXYGEN SATURATION: 98 % | HEIGHT: 65 IN | SYSTOLIC BLOOD PRESSURE: 112 MMHG | TEMPERATURE: 97.6 F

## 2023-12-29 DIAGNOSIS — M81.0 AGE-RELATED OSTEOPOROSIS WITHOUT CURRENT PATHOLOGICAL FRACTURE: ICD-10-CM

## 2023-12-29 DIAGNOSIS — R79.89 ELEVATED BRAIN NATRIURETIC PEPTIDE (BNP) LEVEL: ICD-10-CM

## 2023-12-29 DIAGNOSIS — I10 ESSENTIAL HYPERTENSION: Primary | ICD-10-CM

## 2023-12-29 DIAGNOSIS — D64.89 ANEMIA DUE TO OTHER CAUSE, NOT CLASSIFIED: ICD-10-CM

## 2023-12-29 DIAGNOSIS — E78.2 MIXED HYPERLIPIDEMIA: ICD-10-CM

## 2023-12-29 LAB
ALBUMIN SERPL-MCNC: 4 G/DL (ref 3.5–5)
ALBUMIN/GLOB SERPL: 1.3 (ref 1.1–2.2)
ALP SERPL-CCNC: 64 U/L (ref 45–117)
ALT SERPL-CCNC: 22 U/L (ref 12–78)
ANION GAP SERPL CALC-SCNC: 4 MMOL/L (ref 5–15)
AST SERPL-CCNC: 21 U/L (ref 15–37)
BASOPHILS # BLD: 0.1 K/UL (ref 0–0.1)
BASOPHILS NFR BLD: 1 % (ref 0–1)
BILIRUB SERPL-MCNC: 0.5 MG/DL (ref 0.2–1)
BUN SERPL-MCNC: 17 MG/DL (ref 6–20)
BUN/CREAT SERPL: 20 (ref 12–20)
CALCIUM SERPL-MCNC: 8.9 MG/DL (ref 8.5–10.1)
CHLORIDE SERPL-SCNC: 104 MMOL/L (ref 97–108)
CO2 SERPL-SCNC: 29 MMOL/L (ref 21–32)
CREAT SERPL-MCNC: 0.86 MG/DL (ref 0.55–1.02)
DIFFERENTIAL METHOD BLD: NORMAL
EOSINOPHIL # BLD: 0 K/UL (ref 0–0.4)
EOSINOPHIL NFR BLD: 1 % (ref 0–7)
ERYTHROCYTE [DISTWIDTH] IN BLOOD BY AUTOMATED COUNT: 12.4 % (ref 11.5–14.5)
GLOBULIN SER CALC-MCNC: 3.2 G/DL (ref 2–4)
GLUCOSE SERPL-MCNC: 93 MG/DL (ref 65–100)
HCT VFR BLD AUTO: 41.6 % (ref 35–47)
HGB BLD-MCNC: 13.8 G/DL (ref 11.5–16)
IMM GRANULOCYTES # BLD AUTO: 0 K/UL (ref 0–0.04)
IMM GRANULOCYTES NFR BLD AUTO: 0 % (ref 0–0.5)
LYMPHOCYTES # BLD: 1.8 K/UL (ref 0.8–3.5)
LYMPHOCYTES NFR BLD: 22 % (ref 12–49)
MCH RBC QN AUTO: 29.7 PG (ref 26–34)
MCHC RBC AUTO-ENTMCNC: 33.2 G/DL (ref 30–36.5)
MCV RBC AUTO: 89.7 FL (ref 80–99)
MONOCYTES # BLD: 0.5 K/UL (ref 0–1)
MONOCYTES NFR BLD: 6 % (ref 5–13)
NEUTS SEG # BLD: 5.6 K/UL (ref 1.8–8)
NEUTS SEG NFR BLD: 70 % (ref 32–75)
NRBC # BLD: 0 K/UL (ref 0–0.01)
NRBC BLD-RTO: 0 PER 100 WBC
NT PRO BNP: 82 PG/ML
PLATELET # BLD AUTO: 212 K/UL (ref 150–400)
PMV BLD AUTO: 9.5 FL (ref 8.9–12.9)
POTASSIUM SERPL-SCNC: 4 MMOL/L (ref 3.5–5.1)
PROT SERPL-MCNC: 7.2 G/DL (ref 6.4–8.2)
RBC # BLD AUTO: 4.64 M/UL (ref 3.8–5.2)
SODIUM SERPL-SCNC: 137 MMOL/L (ref 136–145)
WBC # BLD AUTO: 8 K/UL (ref 3.6–11)

## 2023-12-29 PROCEDURE — 3078F DIAST BP <80 MM HG: CPT | Performed by: NURSE PRACTITIONER

## 2023-12-29 PROCEDURE — 3074F SYST BP LT 130 MM HG: CPT | Performed by: NURSE PRACTITIONER

## 2023-12-29 PROCEDURE — 99214 OFFICE O/P EST MOD 30 MIN: CPT | Performed by: NURSE PRACTITIONER

## 2023-12-29 PROCEDURE — 1123F ACP DISCUSS/DSCN MKR DOCD: CPT | Performed by: NURSE PRACTITIONER

## 2023-12-29 RX ORDER — SPIRONOLACTONE 25 MG/1
25 TABLET ORAL DAILY
COMMUNITY
Start: 2023-11-01

## 2023-12-29 RX ORDER — LOSARTAN POTASSIUM 25 MG/1
25 TABLET ORAL DAILY
Qty: 90 TABLET | Refills: 1 | Status: SHIPPED | OUTPATIENT
Start: 2023-12-29

## 2023-12-29 NOTE — PROGRESS NOTES
Chief Complaint   Patient presents with    Hypertension    Follow-up Chronic Condition    Gastroesophageal Reflux    Cholesterol Problem     1. \"Have you been to the ER, urgent care clinic since your last visit? Hospitalized since your last visit? \" no    2. \"Have you seen or consulted any other health care providers outside of the [address] [place] [address] since your last visit? \"  Cardiology
electronic signature was used to authenticate this note.   -- Tim Saunders, APRN - NP

## 2023-12-30 LAB — 25(OH)D3 SERPL-MCNC: 52 NG/ML (ref 30–100)

## 2024-03-01 ENCOUNTER — HOSPITAL ENCOUNTER (OUTPATIENT)
Facility: HOSPITAL | Age: 64
Discharge: HOME | End: 2024-03-01
Payer: COMMERCIAL

## 2024-03-01 DIAGNOSIS — M81.0 AGE-RELATED OSTEOPOROSIS WITHOUT CURRENT PATHOLOGICAL FRACTURE: ICD-10-CM

## 2024-03-01 PROCEDURE — 77080 DXA BONE DENSITY AXIAL: CPT

## 2024-04-24 ENCOUNTER — OFFICE VISIT (OUTPATIENT)
Age: 64
End: 2024-04-24
Payer: COMMERCIAL

## 2024-04-24 VITALS
HEIGHT: 65 IN | OXYGEN SATURATION: 99 % | SYSTOLIC BLOOD PRESSURE: 136 MMHG | BODY MASS INDEX: 21.29 KG/M2 | WEIGHT: 127.8 LBS | RESPIRATION RATE: 16 BRPM | HEART RATE: 106 BPM | DIASTOLIC BLOOD PRESSURE: 85 MMHG | TEMPERATURE: 98.1 F

## 2024-04-24 DIAGNOSIS — M81.0 AGE-RELATED OSTEOPOROSIS WITHOUT CURRENT PATHOLOGICAL FRACTURE: Primary | ICD-10-CM

## 2024-04-24 PROCEDURE — 99213 OFFICE O/P EST LOW 20 MIN: CPT | Performed by: NURSE PRACTITIONER

## 2024-04-24 PROCEDURE — 1123F ACP DISCUSS/DSCN MKR DOCD: CPT | Performed by: NURSE PRACTITIONER

## 2024-04-24 PROCEDURE — 3075F SYST BP GE 130 - 139MM HG: CPT | Performed by: NURSE PRACTITIONER

## 2024-04-24 PROCEDURE — 3079F DIAST BP 80-89 MM HG: CPT | Performed by: NURSE PRACTITIONER

## 2024-04-24 SDOH — ECONOMIC STABILITY: INCOME INSECURITY: HOW HARD IS IT FOR YOU TO PAY FOR THE VERY BASICS LIKE FOOD, HOUSING, MEDICAL CARE, AND HEATING?: NOT HARD AT ALL

## 2024-04-24 SDOH — ECONOMIC STABILITY: FOOD INSECURITY: WITHIN THE PAST 12 MONTHS, THE FOOD YOU BOUGHT JUST DIDN'T LAST AND YOU DIDN'T HAVE MONEY TO GET MORE.: NEVER TRUE

## 2024-04-24 SDOH — ECONOMIC STABILITY: FOOD INSECURITY: WITHIN THE PAST 12 MONTHS, YOU WORRIED THAT YOUR FOOD WOULD RUN OUT BEFORE YOU GOT MONEY TO BUY MORE.: NEVER TRUE

## 2024-04-24 SDOH — ECONOMIC STABILITY: HOUSING INSECURITY
IN THE LAST 12 MONTHS, WAS THERE A TIME WHEN YOU DID NOT HAVE A STEADY PLACE TO SLEEP OR SLEPT IN A SHELTER (INCLUDING NOW)?: NO

## 2024-04-24 ASSESSMENT — PATIENT HEALTH QUESTIONNAIRE - PHQ9
2. FEELING DOWN, DEPRESSED OR HOPELESS: NOT AT ALL
SUM OF ALL RESPONSES TO PHQ QUESTIONS 1-9: 0
SUM OF ALL RESPONSES TO PHQ QUESTIONS 1-9: 0
1. LITTLE INTEREST OR PLEASURE IN DOING THINGS: NOT AT ALL
SUM OF ALL RESPONSES TO PHQ9 QUESTIONS 1 & 2: 0
SUM OF ALL RESPONSES TO PHQ QUESTIONS 1-9: 0
SUM OF ALL RESPONSES TO PHQ QUESTIONS 1-9: 0

## 2024-04-24 NOTE — PROGRESS NOTES
Chief Complaint   Patient presents with    Osteoporosis       \"Have you been to the ER, urgent care clinic since your last visit?  Hospitalized since your last visit?\"    NO    “Have you seen or consulted any other health care providers outside of [facility] since your last visit?”    YES - When: approximately 1 days ago.  Where and Why: [facility].      Click Here for Release of Records Request          3/9/2023     3:44 PM   PHQ-9    Little interest or pleasure in doing things 0   Feeling down, depressed, or hopeless 0   PHQ-2 Score 0   PHQ-9 Total Score 0       Health Maintenance Due   Topic Date Due    HIV screen  Never done    Shingles vaccine (1 of 2) Never done    Respiratory Syncytial Virus (RSV) Pregnant or age 60 yrs+ (1 - 1-dose 60+ series) Never done    COVID-19 Vaccine (3 - 2023-24 season) 09/01/2023    Pneumococcal 65+ years Vaccine (1 of 1 - PCV) Never done    Depression Screen  03/09/2024     
50 MCG (2000 UT) TABS Take by mouth daily    co-enzyme Q-10 30 MG capsule Take by mouth daily    cyanocobalamin 100 MCG tablet 0 Refills     No current facility-administered medications for this visit.       REVIEW OF SYSTEMS:    Review of Systems   All other systems reviewed and are negative.        VITAL SIGNS:    Wt Readings from Last 3 Encounters:   04/24/24 58 kg (127 lb 12.8 oz)   12/29/23 55.3 kg (122 lb)   08/15/23 54.4 kg (120 lb)     Temp Readings from Last 3 Encounters:   04/24/24 98.1 °F (36.7 °C) (Infrared)   12/29/23 97.6 °F (36.4 °C) (Temporal)     BP Readings from Last 3 Encounters:   04/24/24 136/85   12/29/23 112/75   03/09/23 115/75     Pulse Readings from Last 3 Encounters:   04/24/24 (!) 106   12/29/23 92   03/09/23 83           PHYSICAL EXAMINATION:       General: Alert, cooperative, no distress  Respiratory: Breathing comfortably, in no acute respiratory distress.   Skin: Skin color, texture, turgor normal. No rashes or lesions on exposed skin.  Neurologic: A/Ox3  Psychiatric: Normal affect. Mood euthymic. Thoughts logical. Speech volume and speed normal            Treatment risks/benefits/costs/interactions/alternatives discussed with patient.  Advised patient to call back or return to office if symptoms worsen/change/persist. If patient cannot reach us or should anything more severe/urgent arise he/she should proceed directly to the nearest emergency department.  Discussed expected course/resolution/complications of diagnosis in detail with patient.  Patient expressed understanding with the diagnosis and plan.     An electronic signature was used to authenticate this note.  -- JESUS Fraire - NP

## 2024-06-24 DIAGNOSIS — I10 ESSENTIAL HYPERTENSION: ICD-10-CM

## 2024-06-24 NOTE — TELEPHONE ENCOUNTER
PCP: Gabby Treadwell APRN - NP      Future Appointments   Date Time Provider Department Center   7/1/2024  1:45 PM Gabby Treadwell APRN - NP [facility]       Requested Prescriptions     Pending Prescriptions Disp Refills    losartan (COZAAR) 25 MG tablet [Pharmacy Med Name: LOSARTAN POTASSIUM 25 MG TAB] 90 tablet 1     Sig: TAKE 1 TABLET BY MOUTH EVERY DAY       Prior labs and Blood pressures:  BP Readings from Last 3 Encounters:   04/24/24 136/85   12/29/23 112/75   03/09/23 115/75     Lab Results   Component Value Date/Time     12/29/2023 02:31 PM    K 4.0 12/29/2023 02:31 PM     12/29/2023 02:31 PM    CO2 29 12/29/2023 02:31 PM    BUN 17 12/29/2023 02:31 PM    GFRAA >60 07/12/2022 01:58 PM     No results found for: \"HBA1C\", \"YRN1EHPG\"  Lab Results   Component Value Date/Time    CHOL 227 01/03/2023 03:24 PM    HDL 68 01/03/2023 03:24 PM    .2 01/03/2023 03:24 PM    VLDL 12.8 01/03/2023 03:24 PM     No results found for: \"VITD3\"    Lab Results   Component Value Date/Time    TSH 1.300 08/05/2020 11:39 AM

## 2024-06-25 RX ORDER — LOSARTAN POTASSIUM 25 MG/1
25 TABLET ORAL DAILY
Qty: 90 TABLET | Refills: 0 | Status: SHIPPED | OUTPATIENT
Start: 2024-06-25

## 2024-07-01 ENCOUNTER — OFFICE VISIT (OUTPATIENT)
Age: 64
End: 2024-07-01
Payer: COMMERCIAL

## 2024-07-01 VITALS
RESPIRATION RATE: 16 BRPM | WEIGHT: 124.6 LBS | BODY MASS INDEX: 21.27 KG/M2 | TEMPERATURE: 98.1 F | HEIGHT: 64 IN | DIASTOLIC BLOOD PRESSURE: 61 MMHG | SYSTOLIC BLOOD PRESSURE: 97 MMHG | HEART RATE: 88 BPM | OXYGEN SATURATION: 96 %

## 2024-07-01 DIAGNOSIS — M81.0 OSTEOPOROSIS, POST-MENOPAUSAL: Primary | ICD-10-CM

## 2024-07-01 DIAGNOSIS — I10 ESSENTIAL HYPERTENSION: ICD-10-CM

## 2024-07-01 DIAGNOSIS — E78.2 MIXED HYPERLIPIDEMIA: ICD-10-CM

## 2024-07-01 PROCEDURE — 3078F DIAST BP <80 MM HG: CPT | Performed by: NURSE PRACTITIONER

## 2024-07-01 PROCEDURE — 3074F SYST BP LT 130 MM HG: CPT | Performed by: NURSE PRACTITIONER

## 2024-07-01 PROCEDURE — 1123F ACP DISCUSS/DSCN MKR DOCD: CPT | Performed by: NURSE PRACTITIONER

## 2024-07-01 PROCEDURE — 99213 OFFICE O/P EST LOW 20 MIN: CPT | Performed by: NURSE PRACTITIONER

## 2024-07-01 RX ORDER — LOSARTAN POTASSIUM 25 MG/1
25 TABLET ORAL DAILY
Qty: 90 TABLET | Refills: 1 | Status: SHIPPED | OUTPATIENT
Start: 2024-07-01

## 2024-07-01 ASSESSMENT — PATIENT HEALTH QUESTIONNAIRE - PHQ9
SUM OF ALL RESPONSES TO PHQ QUESTIONS 1-9: 0
SUM OF ALL RESPONSES TO PHQ QUESTIONS 1-9: 0
SUM OF ALL RESPONSES TO PHQ9 QUESTIONS 1 & 2: 0
2. FEELING DOWN, DEPRESSED OR HOPELESS: NOT AT ALL
1. LITTLE INTEREST OR PLEASURE IN DOING THINGS: NOT AT ALL
SUM OF ALL RESPONSES TO PHQ QUESTIONS 1-9: 0
SUM OF ALL RESPONSES TO PHQ QUESTIONS 1-9: 0

## 2024-07-01 NOTE — PROGRESS NOTES
Ellen Smith is a 63 y.o. female    Chief Complaint   Patient presents with    Medication Refill       BP 97/61   Pulse 88   Temp 98.1 °F (36.7 °C)   Resp 16   Ht 1.626 m (5' 4\")   Wt 56.5 kg (124 lb 9.6 oz)   SpO2 96%   BMI 21.39 kg/m²         1. Have you been to the ER, urgent care clinic since your last visit?  Hospitalized since your last visit? No    2. Have you seen or consulted any other health care providers outside of the [facility] since your last visit?  Include any pap smears or colon screening. No    Learning Assessment:       No data to display                Fall Risk Assessment:      7/1/2024     1:47 PM 4/24/2024     2:27 PM   Amb Fall Risk Assessment and TUG Test   Do you feel unsteady or are you worried about falling?  no no   2 or more falls in past year? no no   Fall with injury in past year? no no       Abuse Screening:       No data to display                ADL Screening:       No data to display

## 2024-07-01 NOTE — PROGRESS NOTES
[facility]  Clinic Note     Ellen Smith (: 1960) is a 63 y.o. female, established patient, here for evaluation of the following chief complaint(s):  Medication Refill       ASSESSMENT/PLAN:  1. Osteoporosis, post-menopausal  -Counseling previously completed with patient on 2024. - Wishes to proceed with healthy lifestyle and eating to include calcium rich foods.  Ms. Smith is also active walking and lifting weights.  She has declined medication intervention for osteoporosis despite counseling provided and documented osteoporosis.  -Had been on Fosamax at one point, approximately 1 month but was discontinued due to jaw pain       2. Essential hypertension  -Controlled, low but asymptomatic today during office encounter.  -     losartan (COZAAR) 25 MG tablet; Take 1 tablet by mouth daily, Disp-90 tablet, R-1Normal  -     Comprehensive Metabolic Panel; Future    3. Mixed hyperlipidemia  -     Comprehensive Metabolic Panel; Future  - Has verbalized that she does not want to be on cholesterol medication  Lab Results   Component Value Date    CHOL 227 (H) 2023    TRIG 64 2023    HDL 68 2023    .2 (H) 2023    VLDL 12.8 2023    CHOLHDLRATIO 3.3 2023           Return in about 6 months (around 2025).        SUBJECTIVE/OBJECTIVE:    Ellen Smith is a 63 y.o. female seen today for HTN, elevated BNP, HLD    Cardiovascular Review:  She has hypertension and hyperlipidemia.  Reports she had applied for life insurance in which the agency collected a metabolic panel and a BNP level.  She was informed that the BNP was elevated >100.  Ms. Bender has since seen cardiology in which additional testing has been recommended to include echo and's stress test.  Diet and Lifestyle: generally follows a low fat low cholesterol diet, exercises regularly  Home BP Monitoring: is not measured at home.  Pertinent ROS: taking medications as instructed, no medication

## 2024-07-02 LAB
ALBUMIN SERPL-MCNC: 4.1 G/DL (ref 3.5–5)
ALBUMIN/GLOB SERPL: 1.3 (ref 1.1–2.2)
ALP SERPL-CCNC: 66 U/L (ref 45–117)
ALT SERPL-CCNC: 24 U/L (ref 12–78)
ANION GAP SERPL CALC-SCNC: 8 MMOL/L (ref 5–15)
AST SERPL-CCNC: 20 U/L (ref 15–37)
BILIRUB SERPL-MCNC: 0.6 MG/DL (ref 0.2–1)
BUN SERPL-MCNC: 16 MG/DL (ref 6–20)
BUN/CREAT SERPL: 17 (ref 12–20)
CALCIUM SERPL-MCNC: 9.5 MG/DL (ref 8.5–10.1)
CHLORIDE SERPL-SCNC: 103 MMOL/L (ref 97–108)
CO2 SERPL-SCNC: 29 MMOL/L (ref 21–32)
CREAT SERPL-MCNC: 0.93 MG/DL (ref 0.55–1.02)
GLOBULIN SER CALC-MCNC: 3.1 G/DL (ref 2–4)
GLUCOSE SERPL-MCNC: 91 MG/DL (ref 65–100)
POTASSIUM SERPL-SCNC: 4 MMOL/L (ref 3.5–5.1)
PROT SERPL-MCNC: 7.2 G/DL (ref 6.4–8.2)
SODIUM SERPL-SCNC: 140 MMOL/L (ref 136–145)

## 2024-07-26 ENCOUNTER — TRANSCRIBE ORDERS (OUTPATIENT)
Facility: HOSPITAL | Age: 64
End: 2024-07-26

## 2024-07-26 DIAGNOSIS — Z12.31 SCREENING MAMMOGRAM FOR HIGH-RISK PATIENT: Primary | ICD-10-CM

## 2024-08-23 ENCOUNTER — HOSPITAL ENCOUNTER (OUTPATIENT)
Facility: HOSPITAL | Age: 64
Discharge: HOME | End: 2024-08-23
Payer: COMMERCIAL

## 2024-08-23 VITALS — WEIGHT: 124 LBS | HEIGHT: 64 IN | BODY MASS INDEX: 21.17 KG/M2

## 2024-08-23 DIAGNOSIS — Z12.31 SCREENING MAMMOGRAM FOR HIGH-RISK PATIENT: ICD-10-CM

## 2024-08-23 PROCEDURE — 77063 BREAST TOMOSYNTHESIS BI: CPT

## 2024-09-13 LAB — HBA1C MFR BLD HPLC: 5.6 %

## 2024-09-24 ENCOUNTER — TELEPHONE (OUTPATIENT)
Age: 64
End: 2024-09-24

## 2024-09-25 ENCOUNTER — OFFICE VISIT (OUTPATIENT)
Age: 64
End: 2024-09-25
Payer: COMMERCIAL

## 2024-09-25 VITALS
OXYGEN SATURATION: 99 % | RESPIRATION RATE: 12 BRPM | TEMPERATURE: 98.4 F | DIASTOLIC BLOOD PRESSURE: 76 MMHG | BODY MASS INDEX: 21.34 KG/M2 | WEIGHT: 125 LBS | SYSTOLIC BLOOD PRESSURE: 123 MMHG | HEART RATE: 91 BPM | HEIGHT: 64 IN

## 2024-09-25 DIAGNOSIS — R00.2 PALPITATION: ICD-10-CM

## 2024-09-25 DIAGNOSIS — J34.89 NASAL SORE: Primary | ICD-10-CM

## 2024-09-25 PROCEDURE — 3074F SYST BP LT 130 MM HG: CPT | Performed by: NURSE PRACTITIONER

## 2024-09-25 PROCEDURE — 3078F DIAST BP <80 MM HG: CPT | Performed by: NURSE PRACTITIONER

## 2024-09-25 PROCEDURE — 99213 OFFICE O/P EST LOW 20 MIN: CPT | Performed by: NURSE PRACTITIONER

## 2024-09-25 PROCEDURE — 1123F ACP DISCUSS/DSCN MKR DOCD: CPT | Performed by: NURSE PRACTITIONER

## 2024-09-26 LAB
BACTERIA SPEC CULT: NORMAL
BACTERIA SPEC CULT: NORMAL
SERVICE CMNT-IMP: NORMAL

## 2025-03-18 DIAGNOSIS — I10 ESSENTIAL HYPERTENSION: ICD-10-CM

## 2025-03-18 NOTE — TELEPHONE ENCOUNTER
PCP: Gabby Treadwell, APRN - NP    Last appt: 9/25/2024   No future appointments.    Requested Prescriptions     Pending Prescriptions Disp Refills    losartan (COZAAR) 25 MG tablet [Pharmacy Med Name: LOSARTAN POTASSIUM 25 MG TAB] 90 tablet 1     Sig: TAKE 1 TABLET BY MOUTH EVERY DAY

## 2025-03-19 NOTE — TELEPHONE ENCOUNTER
LVM to callback the office,pt need's to make an In office Appt with CARA Treadwell before her Losartan 25 MG can be filled.

## 2025-03-21 DIAGNOSIS — I10 ESSENTIAL HYPERTENSION: ICD-10-CM

## 2025-03-21 RX ORDER — LOSARTAN POTASSIUM 25 MG/1
25 TABLET ORAL DAILY
Qty: 90 TABLET | Refills: 1 | Status: SHIPPED | OUTPATIENT
Start: 2025-03-21

## 2025-03-21 NOTE — TELEPHONE ENCOUNTER
Patient call for refill losartan.  Sandra Chong    (Noted this is already pending from 3/18/25 request.) Noted call/LVM on 3/19/25-     Also tried contacting patient to advise- LVM to schedule appt. Added info to RX.   Sandra Chong    Last appointment: 9/25/24 [facility]  Next appointment: None  Previous refill encounter(s): 71/24 90 + 1    Requested Prescriptions     Pending Prescriptions Disp Refills    losartan (COZAAR) 25 MG tablet 30 tablet 0     Sig: Take 1 tablet by mouth daily Patient needs to schedule appointment for refill.     For Pharmacy Admin Tracking Only    Program: Medication Refill  CPA in place:    Recommendation Provided To:   Intervention Detail: New Rx: 1, reason: Patient Preference  Intervention Accepted By:   Gap Closed?:    Time Spent (min): 5

## 2025-03-24 NOTE — TELEPHONE ENCOUNTER
PCP: Gabby Treadwell, APRN - NP    Last appt: 9/25/2024     No future appointments.    Requested Prescriptions     Pending Prescriptions Disp Refills    losartan (COZAAR) 25 MG tablet 90 tablet 1     Sig: Take 1 tablet by mouth daily Patient needs to schedule appointment for refill.         Prior labs and Blood pressures:  BP Readings from Last 3 Encounters:   09/25/24 123/76   07/01/24 97/61   04/24/24 136/85     Lab Results   Component Value Date/Time     07/01/2024 02:18 PM    K 4.0 07/01/2024 02:18 PM     07/01/2024 02:18 PM    CO2 29 07/01/2024 02:18 PM    BUN 16 07/01/2024 02:18 PM    GFRAA >60 07/12/2022 01:58 PM     Lab Results   Component Value Date/Time    CHOL 227 01/03/2023 03:24 PM    HDL 68 01/03/2023 03:24 PM    .2 01/03/2023 03:24 PM    VLDL 12.8 01/03/2023 03:24 PM      Lab Results   Component Value Date/Time    TSH 1.300 08/05/2020 11:39 AM

## 2025-03-25 RX ORDER — LOSARTAN POTASSIUM 25 MG/1
25 TABLET ORAL DAILY
Qty: 90 TABLET | Refills: 0 | Status: SHIPPED | OUTPATIENT
Start: 2025-03-25

## 2025-04-02 RX ORDER — LOSARTAN POTASSIUM 25 MG/1
25 TABLET ORAL DAILY
Qty: 30 TABLET | Refills: 0 | OUTPATIENT
Start: 2025-04-02

## 2025-06-12 ENCOUNTER — OFFICE VISIT (OUTPATIENT)
Age: 65
End: 2025-06-12
Payer: COMMERCIAL

## 2025-06-12 VITALS
SYSTOLIC BLOOD PRESSURE: 98 MMHG | BODY MASS INDEX: 21.21 KG/M2 | TEMPERATURE: 97.2 F | OXYGEN SATURATION: 96 % | RESPIRATION RATE: 15 BRPM | DIASTOLIC BLOOD PRESSURE: 68 MMHG | HEIGHT: 64 IN | HEART RATE: 96 BPM | WEIGHT: 124.2 LBS

## 2025-06-12 DIAGNOSIS — I10 ESSENTIAL HYPERTENSION: ICD-10-CM

## 2025-06-12 DIAGNOSIS — R00.2 PALPITATION: ICD-10-CM

## 2025-06-12 DIAGNOSIS — E78.2 MIXED HYPERLIPIDEMIA: ICD-10-CM

## 2025-06-12 DIAGNOSIS — R73.01 IMPAIRED FASTING GLUCOSE: Primary | ICD-10-CM

## 2025-06-12 DIAGNOSIS — N30.00 ACUTE CYSTITIS WITHOUT HEMATURIA: ICD-10-CM

## 2025-06-12 PROCEDURE — 3074F SYST BP LT 130 MM HG: CPT | Performed by: NURSE PRACTITIONER

## 2025-06-12 PROCEDURE — 3078F DIAST BP <80 MM HG: CPT | Performed by: NURSE PRACTITIONER

## 2025-06-12 PROCEDURE — 1123F ACP DISCUSS/DSCN MKR DOCD: CPT | Performed by: NURSE PRACTITIONER

## 2025-06-12 PROCEDURE — 99214 OFFICE O/P EST MOD 30 MIN: CPT | Performed by: NURSE PRACTITIONER

## 2025-06-12 RX ORDER — LOSARTAN POTASSIUM 25 MG/1
25 TABLET ORAL DAILY
Qty: 90 TABLET | Refills: 1 | Status: SHIPPED | OUTPATIENT
Start: 2025-06-12

## 2025-06-12 SDOH — ECONOMIC STABILITY: FOOD INSECURITY: WITHIN THE PAST 12 MONTHS, YOU WORRIED THAT YOUR FOOD WOULD RUN OUT BEFORE YOU GOT MONEY TO BUY MORE.: NEVER TRUE

## 2025-06-12 SDOH — ECONOMIC STABILITY: FOOD INSECURITY: WITHIN THE PAST 12 MONTHS, THE FOOD YOU BOUGHT JUST DIDN'T LAST AND YOU DIDN'T HAVE MONEY TO GET MORE.: NEVER TRUE

## 2025-06-12 ASSESSMENT — PATIENT HEALTH QUESTIONNAIRE - PHQ9
SUM OF ALL RESPONSES TO PHQ QUESTIONS 1-9: 0
1. LITTLE INTEREST OR PLEASURE IN DOING THINGS: NOT AT ALL
2. FEELING DOWN, DEPRESSED OR HOPELESS: NOT AT ALL

## 2025-06-12 NOTE — ASSESSMENT & PLAN NOTE
- Statin recommended and prescribed by cardiology.  Patient did not start this.  - Prefers diet and exercise over medication.        5

## 2025-06-12 NOTE — ASSESSMENT & PLAN NOTE
Stable. At goal. Asymptomatic.  - Increased exercise regimen  - advised to monitor BP at home  Orders:    losartan (COZAAR) 25 MG tablet; Take 1 tablet by mouth daily Patient needs to schedule appointment for refill.    Comprehensive Metabolic Panel; Future

## 2025-06-14 LAB
ALBUMIN SERPL-MCNC: 3.9 G/DL (ref 3.5–5)
ALBUMIN/GLOB SERPL: 1.3 (ref 1.1–2.2)
ALP SERPL-CCNC: 63 U/L (ref 45–117)
ALT SERPL-CCNC: 25 U/L (ref 12–78)
ANION GAP SERPL CALC-SCNC: 5 MMOL/L (ref 2–12)
AST SERPL-CCNC: 24 U/L (ref 15–37)
BILIRUB SERPL-MCNC: 0.5 MG/DL (ref 0.2–1)
BUN SERPL-MCNC: 18 MG/DL (ref 6–20)
BUN/CREAT SERPL: 22 (ref 12–20)
CALCIUM SERPL-MCNC: 9.6 MG/DL (ref 8.5–10.1)
CHLORIDE SERPL-SCNC: 103 MMOL/L (ref 97–108)
CO2 SERPL-SCNC: 29 MMOL/L (ref 21–32)
CREAT SERPL-MCNC: 0.82 MG/DL (ref 0.55–1.02)
EST. AVERAGE GLUCOSE BLD GHB EST-MCNC: 105 MG/DL
GLOBULIN SER CALC-MCNC: 3.1 G/DL (ref 2–4)
GLUCOSE SERPL-MCNC: 87 MG/DL (ref 65–100)
HBA1C MFR BLD: 5.3 % (ref 4–5.6)
POTASSIUM SERPL-SCNC: 4.1 MMOL/L (ref 3.5–5.1)
PROT SERPL-MCNC: 7 G/DL (ref 6.4–8.2)
SODIUM SERPL-SCNC: 137 MMOL/L (ref 136–145)

## 2025-06-17 ENCOUNTER — RESULTS FOLLOW-UP (OUTPATIENT)
Age: 65
End: 2025-06-17

## 2025-08-19 ENCOUNTER — TRANSCRIBE ORDERS (OUTPATIENT)
Facility: HOSPITAL | Age: 65
End: 2025-08-19

## 2025-08-19 DIAGNOSIS — Z12.31 VISIT FOR SCREENING MAMMOGRAM: Primary | ICD-10-CM

## 2025-08-26 ENCOUNTER — HOSPITAL ENCOUNTER (OUTPATIENT)
Facility: HOSPITAL | Age: 65
Discharge: HOME | End: 2025-08-29
Payer: COMMERCIAL

## 2025-08-26 VITALS — HEIGHT: 64 IN | WEIGHT: 124 LBS | BODY MASS INDEX: 21.17 KG/M2

## 2025-08-26 DIAGNOSIS — Z12.31 VISIT FOR SCREENING MAMMOGRAM: ICD-10-CM

## 2025-08-26 PROCEDURE — 77063 BREAST TOMOSYNTHESIS BI: CPT

## 2025-08-29 ENCOUNTER — RESULTS FOLLOW-UP (OUTPATIENT)
Age: 65
End: 2025-08-29

## (undated) DEVICE — BLADE ELECTRODE: Brand: EDGE

## (undated) DEVICE — SET ADMIN 16ML TBNG L100IN 2 Y INJ SITE IV PIGGY BK DISP (ORDER IN MULIPLES OF 48)

## (undated) DEVICE — TOWEL 4 PLY TISS 19X30 SUE WHT

## (undated) DEVICE — ELECTRODE,RADIOTRANSLUCENT,FOAM,5PK: Brand: MEDLINE

## (undated) DEVICE — Device

## (undated) DEVICE — POOLE SUCTION INSTRUMENT WITH REMOVABLE SHEATH: Brand: POOLE

## (undated) DEVICE — BLADE ASSEMB CLP HAIR FINE --

## (undated) DEVICE — INFECTION CONTROL KIT SYS

## (undated) DEVICE — TELFA NON-ADHERENT ABSORBENT DRESSING: Brand: TELFA

## (undated) DEVICE — TRAY PREP DRY W/ PREM GLV 2 APPL 6 SPNG 2 UNDPD 1 OVERWRAP

## (undated) DEVICE — SPONGE LAP 18X18IN STRL -- 5/PK

## (undated) DEVICE — CATH IV AUTOGRD BC PNK 20GA 25 -- INSYTE

## (undated) DEVICE — 1200 GUARD II KIT W/5MM TUBE W/O VAC TUBE: Brand: GUARDIAN

## (undated) DEVICE — GLOVE SURG SZ 75 L1212IN FNGR THK138MIL BRN LTX FREE

## (undated) DEVICE — BASIN EMSIS 16OZ GRAPHITE PLAS KID SHP MOLD GRAD FOR ORAL

## (undated) DEVICE — (D)PREP SKN CHLRAPRP APPL 26ML -- CONVERT TO ITEM 371833

## (undated) DEVICE — SYR 3ML LL TIP 1/10ML GRAD --

## (undated) DEVICE — NEONATAL-ADULT SPO2 SENSOR: Brand: NELLCOR

## (undated) DEVICE — PAD SANIT NPKN 4IN GRD

## (undated) DEVICE — TOWEL SURG W17XL27IN STD BLU COT NONFENESTRATED PREWASHED

## (undated) DEVICE — STERILE NEOPRENE POWDER-FREE SURGICAL GLOVES WITH NITRILE COATING: Brand: PROTEXIS

## (undated) DEVICE — CONTAINER SPEC 20 ML LID NEUT BUFF FORMALIN 10 % POLYPR STS

## (undated) DEVICE — REM POLYHESIVE ADULT PATIENT RETURN ELECTRODE: Brand: VALLEYLAB

## (undated) DEVICE — DISPOSABLE DISTAL ATTACHMENT: Brand: DISPOSABLE DISTAL ATTACHMENT

## (undated) DEVICE — TRAP,MUCUS SPECIMEN, 80CC: Brand: MEDLINE

## (undated) DEVICE — SYR 10ML LUER LOK 1/5ML GRAD --

## (undated) DEVICE — SET IV EXT 3WAY STOPCOCK 20IN --

## (undated) DEVICE — SNARE ENDOSCP M L240CM W27MM SHTH DIA2.4MM CHN 2.8MM OVL

## (undated) DEVICE — SURGICAL PROCEDURE PACK GYN ONCOLOGY CUST ST MARYS LF

## (undated) DEVICE — 3000CC GUARDIAN II: Brand: GUARDIAN

## (undated) DEVICE — Z DISCONTINUED PER MEDLINE LINE GAS SAMPLING O2/CO2 LNG AD 13 FT NSL W/ TBNG FILTERLINE

## (undated) DEVICE — SUTURE VCRL SZ 3-0 L27IN ABSRB UD L26MM SH 1/2 CIR J416H

## (undated) DEVICE — SUTURE PDS II SZ 1 L96IN ABSRB VLT TP-1 L65MM 1/2 CIR Z880G

## (undated) DEVICE — DEVON™ KNEE AND BODY STRAP 60" X 3" (1.5 M X 7.6 CM): Brand: DEVON

## (undated) DEVICE — NDL INJ SCLERO 25G 240CM -- INTERJECT M00518310 BX/5

## (undated) DEVICE — SOLUTION IV 1000ML 0.9% SOD CHL

## (undated) DEVICE — KIT,1200CC CANISTER,3/16"X6' TUBING: Brand: MEDLINE INDUSTRIES, INC.

## (undated) DEVICE — SUTURE MCRYL SZ 4-0 L27IN ABSRB UD L19MM PS-2 1/2 CIR PRIM Y426H

## (undated) DEVICE — (D)AGENT INJECTN ELEVIEW 10ML -- DISC BY MFG

## (undated) DEVICE — HYPODERMIC SAFETY NEEDLE: Brand: MAGELLAN

## (undated) DEVICE — LIGHT HANDLE: Brand: DEVON

## (undated) DEVICE — STRAINER URIN CALC RNL MSH -- CONVERT TO ITEM 357634

## (undated) DEVICE — SUTURE VCRL SZ 0 L36IN ABSRB VLT L36MM CT-1 1/2 CIR J346H

## (undated) DEVICE — ROCKER SWITCH PENCIL BLADE ELECTRODE, HOLSTER: Brand: EDGE

## (undated) DEVICE — APPLICATOR BNDG 1MM ADH PREMIERPRO EXOFIN

## (undated) DEVICE — SYRINGE MED 10ML LUERLOCK TIP W/O SFTY DISP

## (undated) DEVICE — STAIN INDIA INK IN NACL 10ML --

## (undated) DEVICE — SOLUTION IRRIG 1000ML H2O STRL BLT

## (undated) DEVICE — SURGICAL PROCEDURE PACK BASIN MAJ SET CUST NO CAUT

## (undated) DEVICE — SOLIDIFIER FLD 2OZ 1500CC N DISINF IN BTL DISP SAFESORB

## (undated) DEVICE — CATH FOL TY IC BAG 16FR 2000ML -- CONVERT TO ITEM 363158

## (undated) DEVICE — SUTURE VCRL SZ 2-0 L36IN ABSRB UD L36MM CT-1 1/2 CIR J945H

## (undated) DEVICE — NON-REM POLYHESIVE PATIENT RETURN ELECTRODE: Brand: VALLEYLAB

## (undated) DEVICE — KENDALL SCD EXPRESS SLEEVES, KNEE LENGTH, MEDIUM: Brand: KENDALL SCD